# Patient Record
Sex: MALE | Race: WHITE | NOT HISPANIC OR LATINO | Employment: OTHER | URBAN - METROPOLITAN AREA
[De-identification: names, ages, dates, MRNs, and addresses within clinical notes are randomized per-mention and may not be internally consistent; named-entity substitution may affect disease eponyms.]

---

## 2017-06-12 ENCOUNTER — TELEPHONE (OUTPATIENT)
Dept: FAMILY MEDICINE | Facility: CLINIC | Age: 62
End: 2017-06-12

## 2017-06-23 ENCOUNTER — OFFICE VISIT (OUTPATIENT)
Dept: FAMILY MEDICINE | Facility: CLINIC | Age: 62
End: 2017-06-23
Payer: COMMERCIAL

## 2017-06-23 VITALS
BODY MASS INDEX: 24.64 KG/M2 | WEIGHT: 176 LBS | HEART RATE: 74 BPM | TEMPERATURE: 96 F | HEIGHT: 71 IN | RESPIRATION RATE: 24 BRPM | OXYGEN SATURATION: 97 % | SYSTOLIC BLOOD PRESSURE: 138 MMHG | DIASTOLIC BLOOD PRESSURE: 80 MMHG

## 2017-06-23 DIAGNOSIS — I10 BENIGN ESSENTIAL HYPERTENSION: ICD-10-CM

## 2017-06-23 DIAGNOSIS — M72.0 DUPUYTREN'S CONTRACTURE OF HAND: ICD-10-CM

## 2017-06-23 DIAGNOSIS — H54.40 BLIND LEFT EYE: ICD-10-CM

## 2017-06-23 DIAGNOSIS — E78.5 HYPERLIPIDEMIA LDL GOAL <130: ICD-10-CM

## 2017-06-23 DIAGNOSIS — R21 RASH: Primary | ICD-10-CM

## 2017-06-23 PROBLEM — I25.2 OLD MYOCARDIAL INFARCTION: Status: ACTIVE | Noted: 2017-06-23

## 2017-06-23 PROCEDURE — 99214 OFFICE O/P EST MOD 30 MIN: CPT | Performed by: FAMILY MEDICINE

## 2017-06-23 RX ORDER — LISINOPRIL AND HYDROCHLOROTHIAZIDE 20; 25 MG/1; MG/1
1 TABLET ORAL DAILY
Qty: 90 TABLET | Refills: 3 | COMMUNITY
Start: 2017-06-23 | End: 2018-04-18 | Stop reason: ALTCHOICE

## 2017-06-23 RX ORDER — ATENOLOL 100 MG/1
100 TABLET ORAL DAILY
Qty: 90 TABLET | Refills: 3 | COMMUNITY
Start: 2017-06-23 | End: 2018-04-18

## 2017-06-23 RX ORDER — PRAVASTATIN SODIUM 40 MG
40 TABLET ORAL DAILY
Qty: 90 TABLET | Refills: 3 | COMMUNITY
Start: 2017-06-23 | End: 2018-04-18

## 2017-06-23 RX ORDER — OMEPRAZOLE 40 MG/1
40 CAPSULE, DELAYED RELEASE ORAL DAILY
Qty: 90 CAPSULE | Refills: 3 | COMMUNITY
Start: 2017-06-23 | End: 2018-04-18

## 2017-06-23 NOTE — MR AVS SNAPSHOT
After Visit Summary   6/23/2017    Isabel Alvarez    MRN: [id number]           Patient Information     Date Of Birth          1955        Visit Information        Provider Department      6/23/2017 8:40 AM Hossein Tierney MD [facility]        Today's Diagnoses     Rash    -  1       Follow-ups after your visit        Additional Services     DERMATOLOGY REFERRAL       Your provider has referred you to: [facility] [place] [phone]   [email or web address]    Please be aware that coverage of these services is subject to the terms and limitations of your health insurance plan.  Call member services at your health plan with any benefit or coverage questions.      Please bring the following with you to your appointment:    (1) Any X-Rays, CTs or MRIs which have been performed.  Contact the facility where they were done to arrange for  prior to your scheduled appointment.    (2) List of current medications  (3) This referral request   (4) Any documents/labs given to you for this referral                  Who to contact     If you have questions or need follow up information about today's clinic visit or your schedule please contact [facility] directly at [phone].  Normal or non-critical lab and imaging results will be communicated to you by MyChart, letter or phone within 4 business days after the clinic has received the results. If you do not hear from us within 7 days, please contact the clinic through MyChart or phone. If you have a critical or abnormal lab result, we will notify you by phone as soon as possible.  Submit refill requests through [employer] or call your pharmacy and they will forward the refill request to us. Please allow 3 business days for your refill to be completed.          Additional Information About Your Visit        [employer] Information     [employer] lets you send messages to your  "doctor, view your test results, renew your prescriptions, schedule appointments and more. To sign up, go to [email or web address] . Click on \"Log in\" on the left side of the screen, which will take you to the Welcome page. Then click on \"Sign up Now\" on the right side of the page.     You will be asked to enter the access code listed below, as well as some personal information. Please follow the directions to create your username and password.     Your access code is: [email or web address]  Expires: 2017  9:24 AM     Your access code will  in 90 days. If you need help or a new code, please call your [place] clinic or [phone].        Care EveryWhere ID     This is your Care EveryWhere ID. This could be used by other organizations to access your [place] medical records  [id number]        Your Vitals Were     Pulse Temperature Respirations Height Pulse Oximetry BMI (Body Mass Index)    74 96  F (35.6  C) (Temporal) 24 5' 10.6\" (1.793 m) 97% 24.83 kg/m2       Blood Pressure from Last 3 Encounters:   17 150/84    Weight from Last 3 Encounters:   17 176 lb (79.8 kg)              We Performed the Following     DERMATOLOGY REFERRAL        Primary Care Provider    None Specified       No primary provider on file.        Equal Access to Services     [facility]: Hadii aad ku hadasho Soomaali, waaxda luqadaha, qaybta kaalmada adekiayada, constantin lomeli . So [facility] [phone].    ATENCIÓN: Si habla español, tiene a julio disposición servicios gratuitos de asistencia lingüística. Llame al [phone].    We comply with applicable federal civil rights laws and [place] laws. We do not discriminate on the basis of race, color, national origin, age, disability sex, sexual orientation or gender identity.            Thank you!     Thank you for choosing [facility]  for your care. Our goal is always to provide you with excellent care. Hearing back from our patients is " one way we can continue to improve our services. Please take a few minutes to complete the written survey that you may receive in the mail after your visit with us. Thank you!             Your Updated Medication List - Protect others around you: Learn how to safely use, store and throw away your medicines at www.disposemymeds.org.          This list is accurate as of: 6/23/17  9:24 AM.  Always use your most recent med list.                   Brand Name Dispense Instructions for use Diagnosis    atenolol 100 MG tablet    TENORMIN    90 tablet    Take 1 tablet (100 mg) by mouth daily        lisinopril-hydrochlorothiazide 20-25 MG per tablet    PRINZIDE/ZESTORETIC    90 tablet    Take 1 tablet by mouth daily        omeprazole 40 MG capsule    priLOSEC    90 capsule    Take 1 capsule (40 mg) by mouth daily Take 30-60 minutes before a meal.        PRAVACHOL 40 MG tablet   Generic drug:  pravastatin     90 tablet    Take 1 tablet (40 mg) by mouth daily
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
## 2017-06-23 NOTE — NURSING NOTE
"Chief Complaint   Patient presents with     Establish Care       Initial /84  Pulse 74  Temp 96  F (35.6  C) (Temporal)  Resp 24  Ht 5' 10.6\" (1.793 m)  Wt 176 lb (79.8 kg)  SpO2 97%  BMI 24.83 kg/m2 Estimated body mass index is 24.83 kg/(m^2) as calculated from the following:    Height as of this encounter: 5' 10.6\" (1.793 m).    Weight as of this encounter: 176 lb (79.8 kg).  Medication Reconciliation: complete    "

## 2017-07-19 ENCOUNTER — TRANSFERRED RECORDS (OUTPATIENT)
Dept: HEALTH INFORMATION MANAGEMENT | Facility: CLINIC | Age: 62
End: 2017-07-19

## 2017-07-20 DIAGNOSIS — I10 BENIGN ESSENTIAL HYPERTENSION: Primary | ICD-10-CM

## 2017-07-20 RX ORDER — LISINOPRIL 20 MG/1
20 TABLET ORAL DAILY
Qty: 90 TABLET | Refills: 3 | Status: SHIPPED | OUTPATIENT
Start: 2017-07-20 | End: 2017-12-15

## 2017-07-20 RX ORDER — METOPROLOL SUCCINATE 200 MG/1
200 TABLET, EXTENDED RELEASE ORAL DAILY
Qty: 90 TABLET | Refills: 1 | Status: SHIPPED | OUTPATIENT
Start: 2017-07-20 | End: 2017-12-15

## 2017-07-20 NOTE — TELEPHONE ENCOUNTER
Reason for Call:  Medication or medication refill:    Do you use a [place] [facility]?  Name of the pharmacy and phone number for the current request:  [facility] - [phone]    Name of the medication requested:     Other request: patients mother is calling stating that Sj doesn't have any atenolol (TENORMIN) 100 MG tablet and requesting a replacement be sent to the pharmacy. 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 Please advise    Can we leave a detailed message on this number? YES    Phone number patient can be reached at: Home number on file [phone] (home)    Best Time: any    Call taken on 7/20/2017 at 3:28 PM by Tanna Dwyer

## 2017-07-20 NOTE — TELEPHONE ENCOUNTER
Dr. Tierney please sign for metoprolol. This has been suggested by pharmacy to use in place of Atenolol due to atenolol shortage.  Carina Ram CMA ([facility])

## 2017-09-27 ENCOUNTER — TRANSFERRED RECORDS (OUTPATIENT)
Dept: HEALTH INFORMATION MANAGEMENT | Facility: CLINIC | Age: 62
End: 2017-09-27

## 2017-11-30 ENCOUNTER — TELEPHONE (OUTPATIENT)
Dept: FAMILY MEDICINE | Facility: CLINIC | Age: 62
End: 2017-11-30

## 2017-11-30 NOTE — TELEPHONE ENCOUNTER
Panel Management Review      Patient has the following on his problem list: None      Composite cancer screening  Chart review shows that this patient is due/due soon for the following Colonoscopy  Summary:    Patient is due/failing the following:   COLONOSCOPY    Action needed:   Colonoscopy     Type of outreach:    Sent letter.    Questions for provider review:    None                                                                                                                                    ers     Chart routed to none .

## 2017-11-30 NOTE — LETTER
November 30, 2017      Isabel Alvarez  [address] [facility] [address]        Dear Isabel,       Our records show that you are due for a screening colonoscopy. Please call scheduling at [phone] to make your appointment to discuss this with your doctor. We look forward to seeing you.      Sincerely,        Hossein Tierney MD

## 2017-12-15 ENCOUNTER — OFFICE VISIT (OUTPATIENT)
Dept: FAMILY MEDICINE | Facility: CLINIC | Age: 62
End: 2017-12-15
Payer: COMMERCIAL

## 2017-12-15 VITALS
SYSTOLIC BLOOD PRESSURE: 126 MMHG | OXYGEN SATURATION: 96 % | DIASTOLIC BLOOD PRESSURE: 70 MMHG | HEART RATE: 77 BPM | WEIGHT: 175.1 LBS | HEIGHT: 71 IN | BODY MASS INDEX: 24.51 KG/M2 | TEMPERATURE: 97.7 F

## 2017-12-15 DIAGNOSIS — Z12.5 SCREENING FOR PROSTATE CANCER: ICD-10-CM

## 2017-12-15 DIAGNOSIS — I10 BENIGN ESSENTIAL HYPERTENSION: Primary | ICD-10-CM

## 2017-12-15 DIAGNOSIS — Z12.11 SCREENING FOR COLON CANCER: ICD-10-CM

## 2017-12-15 DIAGNOSIS — E78.5 HYPERLIPIDEMIA LDL GOAL <130: ICD-10-CM

## 2017-12-15 DIAGNOSIS — Z72.0 TOBACCO ABUSE DISORDER: ICD-10-CM

## 2017-12-15 LAB
ALBUMIN SERPL-MCNC: 4.1 G/DL (ref 3.4–5)
ALP SERPL-CCNC: 104 U/L (ref 40–150)
ALT SERPL W P-5'-P-CCNC: 52 U/L (ref 0–70)
ANION GAP SERPL CALCULATED.3IONS-SCNC: 8 MMOL/L (ref 3–14)
AST SERPL W P-5'-P-CCNC: 52 U/L (ref 0–45)
BILIRUB SERPL-MCNC: 0.5 MG/DL (ref 0.2–1.3)
BUN SERPL-MCNC: 5 MG/DL (ref 7–30)
CALCIUM SERPL-MCNC: 9.1 MG/DL (ref 8.5–10.1)
CHLORIDE SERPL-SCNC: 92 MMOL/L (ref 94–109)
CHOLEST SERPL-MCNC: 148 MG/DL
CO2 SERPL-SCNC: 27 MMOL/L (ref 20–32)
CREAT SERPL-MCNC: 0.88 MG/DL (ref 0.66–1.25)
GFR SERPL CREATININE-BSD FRML MDRD: 89 ML/MIN/1.7M2
GLUCOSE SERPL-MCNC: 100 MG/DL (ref 70–99)
HDLC SERPL-MCNC: 60 MG/DL
LDLC SERPL CALC-MCNC: 69 MG/DL
NONHDLC SERPL-MCNC: 88 MG/DL
POTASSIUM SERPL-SCNC: 4.2 MMOL/L (ref 3.4–5.3)
PROT SERPL-MCNC: 8.4 G/DL (ref 6.8–8.8)
PSA SERPL-ACNC: 1.63 UG/L (ref 0–4)
SODIUM SERPL-SCNC: 127 MMOL/L (ref 133–144)
TRIGL SERPL-MCNC: 95 MG/DL

## 2017-12-15 PROCEDURE — G0103 PSA SCREENING: HCPCS | Performed by: FAMILY MEDICINE

## 2017-12-15 PROCEDURE — 99214 OFFICE O/P EST MOD 30 MIN: CPT | Performed by: FAMILY MEDICINE

## 2017-12-15 PROCEDURE — 36415 COLL VENOUS BLD VENIPUNCTURE: CPT | Performed by: FAMILY MEDICINE

## 2017-12-15 PROCEDURE — 80061 LIPID PANEL: CPT | Performed by: FAMILY MEDICINE

## 2017-12-15 PROCEDURE — 80053 COMPREHEN METABOLIC PANEL: CPT | Performed by: FAMILY MEDICINE

## 2017-12-15 RX ORDER — VARENICLINE TARTRATE 1 MG/1
1 TABLET, FILM COATED ORAL 2 TIMES DAILY
Qty: 60 TABLET | Refills: 6 | Status: SHIPPED | OUTPATIENT
Start: 2017-12-15 | End: 2018-12-23

## 2017-12-15 RX ORDER — METOPROLOL SUCCINATE 200 MG/1
200 TABLET, EXTENDED RELEASE ORAL DAILY
Qty: 90 TABLET | Refills: 3 | Status: SHIPPED | OUTPATIENT
Start: 2017-12-15 | End: 2018-04-18

## 2017-12-15 RX ORDER — LISINOPRIL 20 MG/1
20 TABLET ORAL DAILY
Qty: 90 TABLET | Refills: 3 | Status: SHIPPED | OUTPATIENT
Start: 2017-12-15 | End: 2018-04-18

## 2017-12-15 NOTE — NURSING NOTE
"Chief Complaint   Patient presents with     preventative health     colonoscopy        Initial /70 (BP Location: Right arm, Patient Position: Chair, Cuff Size: Adult Large)  Pulse 77  Temp 97.7  F (36.5  C) (Temporal)  Ht 5' 10.6\" (1.793 m)  Wt 175 lb 1.6 oz (79.4 kg)  SpO2 96%  BMI 24.7 kg/m2 Estimated body mass index is 24.7 kg/(m^2) as calculated from the following:    Height as of this encounter: 5' 10.6\" (1.793 m).    Weight as of this encounter: 175 lb 1.6 oz (79.4 kg).  Medication Reconciliation: complete     "

## 2017-12-15 NOTE — PROGRESS NOTES
SUBJECTIVE:   Isabel Alvarez is a 62 year old male who presents to clinic today for the following health issues Several complaints:       Discuss having a Colonoscopy done. He has never had one before in the past.      Patient would like to have his blood work done today as well. He is fasting. He would like to look into getting off of his cholesterol medication if possible. He is currently using Pravastatin. He is not experiencing any joint pain with this.     Wife would like all of his refills timed for one . She is tired of making more than one trip to the pharmacy.            Problem list and histories reviewed & adjusted, as indicated.  Additional history:         Reviewed and updated as needed this visit by clinical staff       Reviewed and updated as needed this visit by Provider        SUBJECTIVE:  Isabel  is a 62 year old male who presents for:  Multiple issues today. Needs follow-up on his high blood pressure and lipids. He is due for blood work. He needs refill on his medications. He wants to try to get all his medications to come due at the same time. Also needs to quit smoking. Otherwise he has been feeling well.    No past medical history on file.  No past surgical history on file.  Social History   Substance Use Topics     Smoking status: Current Every Day Smoker     Packs/day: 0.50     Years: 50.00     Types: Cigarettes     Smokeless tobacco: Never Used     Alcohol use Yes      Comment: 2 cases per week      Current Outpatient Prescriptions   Medication Sig Dispense Refill     metoprolol (TOPROL-XL) 200 MG 24 hr tablet Take 1 tablet (200 mg) by mouth daily 90 tablet 3     lisinopril (PRINIVIL/ZESTRIL) 20 MG tablet Take 1 tablet (20 mg) by mouth daily 90 tablet 3     varenicline (CHANTIX STARTING MONTH ROMERO) 0.5 MG X 11 & 1 MG X 42 tablet Take 0.5 mg tab daily for 3 days, then 0.5 mg tab twice daily for 4 days, then 1 mg twice daily. 53 tablet 0     varenicline (CHANTIX CONTINUING MONTH ROMERO)  "1 MG tablet Take 1 tablet (1 mg) by mouth 2 times daily 60 tablet 6     pravastatin (PRAVACHOL) 40 MG tablet Take 1 tablet (40 mg) by mouth daily 90 tablet 3     omeprazole (PRILOSEC) 40 MG capsule Take 1 capsule (40 mg) by mouth daily Take 30-60 minutes before a meal. 90 capsule 3     [DISCONTINUED] metoprolol (TOPROL-XL) 200 MG 24 hr tablet Take 1 tablet (200 mg) by mouth daily 90 tablet 1     [DISCONTINUED] lisinopril (PRINIVIL/ZESTRIL) 20 MG tablet Take 1 tablet (20 mg) by mouth daily 90 tablet 3     lisinopril-hydrochlorothiazide (PRINZIDE/ZESTORETIC) 20-25 MG per tablet Take 1 tablet by mouth daily 90 tablet 3     atenolol (TENORMIN) 100 MG tablet Take 1 tablet (100 mg) by mouth daily 90 tablet 3       REVIEW OF SYSTEMS:   5 point ROS negative except as noted above in HPI, including Gen., Resp, CV, GI &  system review.     OBJECTIVE:  Vitals: /70 (BP Location: Right arm, Patient Position: Chair, Cuff Size: Adult Large)  Pulse 77  Temp 97.7  F (36.5  C) (Temporal)  Ht 5' 10.6\" (1.793 m)  Wt 175 lb 1.6 oz (79.4 kg)  SpO2 96%  BMI 24.7 kg/m2  BMI= Body mass index is 24.7 kg/(m^2).  He is alert and talkative. Neck is supple no JVD. Lungs are with a few wheezes on deep breaths. Heart with a regular rhythm. Skin clear. Extremities with no edema.    ASSESSMENT:  #1 hypertension #2 hyperlipidemia #3 tobacco use disorder    PLAN:  Ordering lab work today. Try to coordinate his medicines to get him at same time we will reorder the Toprol and lisinopril today. He needs a colonoscopy. We will start him on Chantix for smoking cessation. He is aware of possibility of sleep disturbances.        Hossein Tierney MD  [facility]              "

## 2017-12-15 NOTE — MR AVS SNAPSHOT
After Visit Summary   12/15/2017    Isabel Alvarez    MRN: [id number]           Patient Information     Date Of Birth          1955        Visit Information        Provider Department      12/15/2017 9:40 AM Hossein Tierney MD [facility]        Today's Diagnoses     Benign essential hypertension    -  1    Hyperlipidemia LDL goal <130        Screening for prostate cancer        Tobacco abuse disorder        Screening for colon cancer           Follow-ups after your visit        Additional Services     GASTROENTEROLOGY ADULT REF PROCEDURE ONLY       Last Lab Result: No results found for: CR  Body mass index is 24.7 kg/(m^2).     Needed:  No  Language:  English    Patient will be contacted to schedule procedure.     Please be aware that coverage of these services is subject to the terms and limitations of your health insurance plan.  Call member services at your health plan with any benefit or coverage questions.  Any procedures must be performed at a [place] facility OR coordinated by your clinic's referral office.    Please bring the following with you to your appointment:    (1) Any X-Rays, CTs or MRIs which have been performed.  Contact the facility where they were done to arrange for  prior to your scheduled appointment.    (2) List of current medications   (3) This referral request   (4) Any documents/labs given to you for this referral                  Who to contact     If you have questions or need follow up information about today's clinic visit or your schedule please contact [facility] directly at [phone].  Normal or non-critical lab and imaging results will be communicated to you by MyChart, letter or phone within 4 business days after the clinic has received the results. If you do not hear from us within 7 days, please contact the clinic through MyChart or phone. If you have a critical or abnormal lab result, we will notify  "you by phone as soon as possible.  Submit refill requests through [employer] or call your pharmacy and they will forward the refill request to us. Please allow 3 business days for your refill to be completed.          Additional Information About Your Visit        [employer] Information     [employer] lets you send messages to your doctor, view your test results, renew your prescriptions, schedule appointments and more. To sign up, go to [email or web address] . Click on \"Log in\" on the left side of the screen, which will take you to the Welcome page. Then click on \"Sign up Now\" on the right side of the page.     You will be asked to enter the access code listed below, as well as some personal information. Please follow the directions to create your username and password.     Your access code is: [email or web address]  Expires: 3/15/2018  1:53 PM     Your access code will  in 90 days. If you need help or a new code, please call your [place] clinic or [phone].        Care EveryWhere ID     This is your Care EveryWhere ID. This could be used by other organizations to access your [place] medical records  [id number]        Your Vitals Were     Pulse Temperature Height Pulse Oximetry BMI (Body Mass Index)       77 97.7  F (36.5  C) (Temporal) 5' 10.6\" (1.793 m) 96% 24.7 kg/m2        Blood Pressure from Last 3 Encounters:   12/15/17 126/70   17 138/80    Weight from Last 3 Encounters:   12/15/17 175 lb 1.6 oz (79.4 kg)   17 176 lb (79.8 kg)              We Performed the Following     Comprehensive metabolic panel (BMP + Alb, Alk Phos, ALT, AST, Total. Bili, TP)     GASTROENTEROLOGY ADULT REF PROCEDURE ONLY     Lipid Profile     Prostate spec antigen screen          Today's Medication Changes          These changes are accurate as of: 12/15/17  1:53 PM.  If you have any questions, ask your nurse or doctor.               Start taking these medicines.        Dose/Directions    * varenicline 0.5 MG X 11 & 1 MG X 42 " tablet   Commonly known as:  CHANTIX STARTING MONTH PAK   Used for:  Tobacco abuse disorder   Started by:  Hossein Tierney MD        Take 0.5 mg tab daily for 3 days, then 0.5 mg tab twice daily for 4 days, then 1 mg twice daily.   Quantity:  53 tablet   Refills:  0       * varenicline 1 MG tablet   Commonly known as:  CHANTIX CONTINUING MONTH ROMERO   Used for:  Tobacco abuse disorder   Started by:  Hossein Tierney MD        Dose:  1 mg   Take 1 tablet (1 mg) by mouth 2 times daily   Quantity:  60 tablet   Refills:  6       * Notice:  This list has 2 medication(s) that are the same as other medications prescribed for you. Read the directions carefully, and ask your doctor or other care provider to review them with you.         Where to get your medicines      These medications were sent to [facility] [address] [place] [id number]     Phone:  [phone]     lisinopril 20 MG tablet    metoprolol 200 MG 24 hr tablet    varenicline 0.5 MG X 11 & 1 MG X 42 tablet    varenicline 1 MG tablet                Primary Care Provider Office Phone # Fax #    Hossein Tierney [phone]       [address] [place] [address]        Equal Access to Services     [facility]: Hadii stewart ku hadasho Soomaali, waaxda luqadaha, qaybta kaalmada adeegyada, waxay idiin haygeraldon yaya willis. So [facility] [phone].    ATENCIÓN: Si habla español, tiene a julio disposición servicios gratuitos de asistencia lingüística. colette al [phone].    We comply with applicable federal civil rights laws and [place] laws. We do not discriminate on the basis of race, color, national origin, age, disability, sex, sexual orientation, or gender identity.            Thank you!     Thank you for choosing [facility]  for your care. Our goal is always to provide you with excellent care. Hearing back from our patients is  one way we can continue to improve our services. Please take a few minutes to complete the written survey that you may receive in the mail after your visit with us. Thank you!             Your Updated Medication List - Protect others around you: Learn how to safely use, store and throw away your medicines at www.disposemymeds.org.          This list is accurate as of: 12/15/17  1:53 PM.  Always use your most recent med list.                   Brand Name Dispense Instructions for use Diagnosis    atenolol 100 MG tablet    TENORMIN    90 tablet    Take 1 tablet (100 mg) by mouth daily        lisinopril 20 MG tablet    PRINIVIL/ZESTRIL    90 tablet    Take 1 tablet (20 mg) by mouth daily    Benign essential hypertension       lisinopril-hydrochlorothiazide 20-25 MG per tablet    PRINZIDE/ZESTORETIC    90 tablet    Take 1 tablet by mouth daily        metoprolol 200 MG 24 hr tablet    TOPROL-XL    90 tablet    Take 1 tablet (200 mg) by mouth daily    Benign essential hypertension       omeprazole 40 MG capsule    priLOSEC    90 capsule    Take 1 capsule (40 mg) by mouth daily Take 30-60 minutes before a meal.        PRAVACHOL 40 MG tablet   Generic drug:  pravastatin     90 tablet    Take 1 tablet (40 mg) by mouth daily        * varenicline 0.5 MG X 11 & 1 MG X 42 tablet    CHANTIX STARTING MONTH ROMERO    53 tablet    Take 0.5 mg tab daily for 3 days, then 0.5 mg tab twice daily for 4 days, then 1 mg twice daily.    Tobacco abuse disorder       * varenicline 1 MG tablet    CHANTIX CONTINUING MONTH ROMERO    60 tablet    Take 1 tablet (1 mg) by mouth 2 times daily    Tobacco abuse disorder       * Notice:  This list has 2 medication(s) that are the same as other medications prescribed for you. Read the directions carefully, and ask your doctor or other care provider to review them with you.

## 2017-12-19 ENCOUNTER — TELEPHONE (OUTPATIENT)
Dept: FAMILY MEDICINE | Facility: CLINIC | Age: 62
End: 2017-12-19

## 2017-12-19 NOTE — TELEPHONE ENCOUNTER
Called patient to schedule colonoscopy. Patient states that he will have to think about it and will call back when he is ready.

## 2017-12-21 NOTE — PROGRESS NOTES
Labs show your PSA was normal cholesterol l good at 148 but your blood chemistries showed your sodium and chloride level to be a little low.  With this in mind you do not need to watch salt too much and this should be rechecked in about 1-3 months.

## 2018-02-28 ENCOUNTER — TELEPHONE (OUTPATIENT)
Dept: FAMILY MEDICINE | Facility: CLINIC | Age: 63
End: 2018-02-28

## 2018-02-28 NOTE — TELEPHONE ENCOUNTER
Panel Management Review      Patient has the following on his problem list: None      Composite cancer screening  Chart review shows that this patient is due/due soon for the following Colonoscopy  Summary:    Patient is due/failing the following:   COLONOSCOPY    Action needed:   Patient needs referral/order: screening colonoscopy     Type of outreach:    Will reach out in a couple months. Has already been contacted recently     Questions for provider review:    None                                                                                                                                    ers     Chart routed to none .

## 2018-04-10 ENCOUNTER — HOSPITAL ENCOUNTER (OUTPATIENT)
Dept: MRI IMAGING | Facility: CLINIC | Age: 63
Discharge: HOME | End: 2018-04-10
Attending: FAMILY MEDICINE | Admitting: FAMILY MEDICINE
Payer: COMMERCIAL

## 2018-04-10 ENCOUNTER — TRANSFERRED RECORDS (OUTPATIENT)
Dept: HEALTH INFORMATION MANAGEMENT | Facility: CLINIC | Age: 63
End: 2018-04-10

## 2018-04-10 ENCOUNTER — TELEPHONE (OUTPATIENT)
Dept: FAMILY MEDICINE | Facility: CLINIC | Age: 63
End: 2018-04-10

## 2018-04-10 DIAGNOSIS — H57.9 EYE EXAM ABNORMAL: ICD-10-CM

## 2018-04-10 DIAGNOSIS — H57.9 EYE EXAM ABNORMAL: Primary | ICD-10-CM

## 2018-04-10 PROCEDURE — A9585 GADOBUTROL INJECTION: HCPCS | Performed by: FAMILY MEDICINE

## 2018-04-10 PROCEDURE — 25000128 H RX IP 250 OP 636: Performed by: FAMILY MEDICINE

## 2018-04-10 PROCEDURE — 70553 MRI BRAIN STEM W/O & W/DYE: CPT

## 2018-04-10 RX ORDER — GADOBUTROL 604.72 MG/ML
7.5 INJECTION INTRAVENOUS ONCE
Status: COMPLETED | OUTPATIENT
Start: 2018-04-10 | End: 2018-04-10

## 2018-04-10 RX ADMIN — GADOBUTROL 7.5 ML: 604.72 INJECTION INTRAVENOUS at 18:37

## 2018-04-10 NOTE — TELEPHONE ENCOUNTER
Dr. Wang from [facility] called and Isabel had an eye exam that came back abnormal.  The provider wants to rule out a stroke or tumor.  I am going to set him up now for this MRI.     Patient is scheduled for 6 pm tonight.  Patient is aware and if abnormal result they will call the on call provider. Will route this to Dr. Tierney.

## 2018-04-18 ENCOUNTER — OFFICE VISIT (OUTPATIENT)
Dept: FAMILY MEDICINE | Facility: CLINIC | Age: 63
End: 2018-04-18
Payer: COMMERCIAL

## 2018-04-18 VITALS
WEIGHT: 179.4 LBS | BODY MASS INDEX: 25.11 KG/M2 | DIASTOLIC BLOOD PRESSURE: 84 MMHG | OXYGEN SATURATION: 100 % | TEMPERATURE: 97 F | HEIGHT: 71 IN | SYSTOLIC BLOOD PRESSURE: 136 MMHG | HEART RATE: 66 BPM

## 2018-04-18 DIAGNOSIS — K21.9 GASTROESOPHAGEAL REFLUX DISEASE WITHOUT ESOPHAGITIS: ICD-10-CM

## 2018-04-18 DIAGNOSIS — G93.9 BRAIN LESION: Primary | ICD-10-CM

## 2018-04-18 DIAGNOSIS — I10 BENIGN ESSENTIAL HYPERTENSION: ICD-10-CM

## 2018-04-18 DIAGNOSIS — E78.5 HYPERLIPIDEMIA LDL GOAL <130: ICD-10-CM

## 2018-04-18 PROCEDURE — 99214 OFFICE O/P EST MOD 30 MIN: CPT | Performed by: FAMILY MEDICINE

## 2018-04-18 RX ORDER — LISINOPRIL 20 MG/1
20 TABLET ORAL DAILY
Qty: 90 TABLET | Refills: 3 | Status: SHIPPED | OUTPATIENT
Start: 2018-04-18 | End: 2019-04-26

## 2018-04-18 RX ORDER — LISINOPRIL AND HYDROCHLOROTHIAZIDE 20; 25 MG/1; MG/1
1 TABLET ORAL DAILY
Qty: 90 TABLET | Refills: 3 | Status: CANCELLED | OUTPATIENT
Start: 2018-04-18

## 2018-04-18 RX ORDER — PRAVASTATIN SODIUM 40 MG
40 TABLET ORAL DAILY
Qty: 90 TABLET | Refills: 3 | Status: SHIPPED | OUTPATIENT
Start: 2018-04-18 | End: 2019-04-26

## 2018-04-18 RX ORDER — OMEPRAZOLE 40 MG/1
40 CAPSULE, DELAYED RELEASE ORAL DAILY
Qty: 90 CAPSULE | Refills: 3 | Status: SHIPPED | OUTPATIENT
Start: 2018-04-18 | End: 2019-04-26

## 2018-04-18 RX ORDER — METOPROLOL SUCCINATE 200 MG/1
200 TABLET, EXTENDED RELEASE ORAL DAILY
Qty: 90 TABLET | Refills: 3 | Status: SHIPPED | OUTPATIENT
Start: 2018-04-18 | End: 2019-04-26

## 2018-04-18 NOTE — NURSING NOTE
"Chief Complaint   Patient presents with     MRI results     stroke/tumor        Initial /84 (BP Location: Right arm, Patient Position: Chair, Cuff Size: Adult Large)  Pulse 66  Temp 97  F (36.1  C) (Temporal)  Ht 5' 10.6\" (1.793 m)  Wt 179 lb 6.4 oz (81.4 kg)  SpO2 100%  BMI 25.31 kg/m2 Estimated body mass index is 25.31 kg/(m^2) as calculated from the following:    Height as of this encounter: 5' 10.6\" (1.793 m).    Weight as of this encounter: 179 lb 6.4 oz (81.4 kg).  Medication Reconciliation: complete    "

## 2018-04-18 NOTE — MR AVS SNAPSHOT
"              After Visit Summary   2018    Isabel Alvarez    MRN: [id number]           Patient Information     Date Of Birth          1955        Visit Information        Provider Department      2018 11:20 AM Hossein Tierney MD [facility]        Today's Diagnoses     Brain lesion    -  1    Benign essential hypertension        Hyperlipidemia LDL goal <130        Gastroesophageal reflux disease without esophagitis           Follow-ups after your visit        Who to contact     If you have questions or need follow up information about today's clinic visit or your schedule please contact [facility] directly at [phone].  Normal or non-critical lab and imaging results will be communicated to you by [employer], letter or phone within 4 business days after the clinic has received the results. If you do not hear from us within 7 days, please contact the clinic through [employer] or phone. If you have a critical or abnormal lab result, we will notify you by phone as soon as possible.  Submit refill requests through [employer] or call your pharmacy and they will forward the refill request to us. Please allow 3 business days for your refill to be completed.          Additional Information About Your Visit        MyChart Information     [employer] lets you send messages to your doctor, view your test results, renew your prescriptions, schedule appointments and more. To sign up, go to [email or web address] . Click on \"Log in\" on the left side of the screen, which will take you to the Welcome page. Then click on \"Sign up Now\" on the right side of the page.     You will be asked to enter the access code listed below, as well as some personal information. Please follow the directions to create your username and password.     Your access code is: [email or web address]  Expires: 2018  1:12 PM     Your access code will  in 90 days. If you need help or a new code, please call your " "[facility] or [phone].        Care EveryWhere ID     This is your Care EveryWhere ID. This could be used by other organizations to access your [facility] medical records  [id number]        Your Vitals Were     Pulse Temperature Height Pulse Oximetry BMI (Body Mass Index)       66 97  F (36.1  C) (Temporal) 5' 10.6\" (1.793 m) 100% 25.31 kg/m2        Blood Pressure from Last 3 Encounters:   04/18/18 136/84   12/15/17 126/70   06/23/17 138/80    Weight from Last 3 Encounters:   04/18/18 179 lb 6.4 oz (81.4 kg)   12/15/17 175 lb 1.6 oz (79.4 kg)   06/23/17 176 lb (79.8 kg)              Today, you had the following     No orders found for display         Today's Medication Changes          These changes are accurate as of 4/18/18  1:12 PM.  If you have any questions, ask your nurse or doctor.               Stop taking these medicines if you haven't already. Please contact your care team if you have questions.     lisinopril-hydrochlorothiazide 20-25 MG per tablet   Commonly known as:  PRINZIDE/ZESTORETIC   Stopped by:  Hossein Tierney MD                Where to get your medicines      These medications were sent to [facility] [address] [place] [address]     Phone:  [phone]     lisinopril 20 MG tablet    metoprolol succinate 200 MG 24 hr tablet    omeprazole 40 MG capsule    pravastatin 40 MG tablet                Primary Care Provider Office Phone # Fax #    Hossein Tierney [phone]       [address] [facility] [address]        Equal Access to Services     VIKKI MANCERA AH: Hadmikey Jackson, waaxda luqadaha, qaybta kaalmada sophie, constantin willis. So [facility] [phone].    ATENCIÓN: Si habla español, tiene a julio disposición servicios gratuitos de asistencia lingüística. Llame al [phone].    We comply with applicable federal civil rights laws and [place] laws. We do " not discriminate on the basis of race, color, national origin, age, disability, sex, sexual orientation, or gender identity.            Thank you!     Thank you for choosing [facility]  for your care. Our goal is always to provide you with excellent care. Hearing back from our patients is one way we can continue to improve our services. Please take a few minutes to complete the written survey that you may receive in the mail after your visit with us. Thank you!             Your Updated Medication List - Protect others around you: Learn how to safely use, store and throw away your medicines at www.disposemymeds.org.          This list is accurate as of 4/18/18  1:12 PM.  Always use your most recent med list.                   Brand Name Dispense Instructions for use Diagnosis    lisinopril 20 MG tablet    PRINIVIL/ZESTRIL    90 tablet    Take 1 tablet (20 mg) by mouth daily    Benign essential hypertension       metoprolol succinate 200 MG 24 hr tablet    TOPROL-XL    90 tablet    Take 1 tablet (200 mg) by mouth daily    Benign essential hypertension       omeprazole 40 MG capsule    priLOSEC    90 capsule    Take 1 capsule (40 mg) by mouth daily Take 30-60 minutes before a meal.    Gastroesophageal reflux disease without esophagitis       pravastatin 40 MG tablet    PRAVACHOL    90 tablet    Take 1 tablet (40 mg) by mouth daily    Hyperlipidemia LDL goal <130       * varenicline 0.5 MG X 11 & 1 MG X 42 tablet    CHANTIX STARTING MONTH ROMERO    53 tablet    Take 0.5 mg tab daily for 3 days, then 0.5 mg tab twice daily for 4 days, then 1 mg twice daily.    Tobacco abuse disorder       * varenicline 1 MG tablet    CHANTIX CONTINUING MONTH ROMERO    60 tablet    Take 1 tablet (1 mg) by mouth 2 times daily    Tobacco abuse disorder       * Notice:  This list has 2 medication(s) that are the same as other medications prescribed for you. Read the directions carefully, and ask your doctor or other care provider  to review them with you.

## 2018-04-18 NOTE — PROGRESS NOTES
SUBJECTIVE:   Isabel Alvarez is a 62 year old male who presents to clinic today for the following health issues:    Follow up MRI.     Patient had the MRI done 04/10/18 at the request of Dr. Wang from [facility]. Patient had an eye exam there that was abnormal. Dr. Wang had concerns of a possible stroke/tumor. MRI results came back negative. Patient in clinic to discuss this.             Problem list and histories reviewed & adjusted, as indicated.  Additional history: as documented        Reviewed and updated as needed this visit by clinical staff       Reviewed and updated as needed this visit by Provider        SUBJECTIVE:  Isabel  is a 62 year old male who presents for: Discussion of an MRI of his brain.  He went to an eye doctor because he states he sees big black shadows throughout his vision during the day.  He sees white spots at night..  They could not find anything and they wanted a stroke or tumor ruled out and send him for a brain MRI.  This did rule out stroke or tumor and most other abnormalities.  There was a very small spot 2 mm of indeterminate significance in the frontal lobe area.  He states now since the MRI he has had some daily headaches.  The radiologist felt that this could be followed up in about 3 months with an interval MRI.  Another issue is his blood pressure is in for follow-up of that.  We made some changes.  Had to stop the hydrochlorothiazide he is a he was having a reaction to that.  To be doing okay now.  Needs refills on his lipid medication and omeprazole.  No past medical history on file.  No past surgical history on file.  Social History   Substance Use Topics     Smoking status: Former Smoker     Packs/day: 0.50     Years: 50.00     Types: Cigarettes     Quit date: 12/25/2017     Smokeless tobacco: Never Used     Alcohol use Yes      Comment: 2 cases per week      Current Outpatient Prescriptions   Medication Sig Dispense Refill     lisinopril (PRINIVIL/ZESTRIL)  "20 MG tablet Take 1 tablet (20 mg) by mouth daily 90 tablet 3     metoprolol succinate (TOPROL-XL) 200 MG 24 hr tablet Take 1 tablet (200 mg) by mouth daily 90 tablet 3     omeprazole (PRILOSEC) 40 MG capsule Take 1 capsule (40 mg) by mouth daily Take 30-60 minutes before a meal. 90 capsule 3     pravastatin (PRAVACHOL) 40 MG tablet Take 1 tablet (40 mg) by mouth daily 90 tablet 3     varenicline (CHANTIX CONTINUING MONTH PAK) 1 MG tablet Take 1 tablet (1 mg) by mouth 2 times daily 60 tablet 6     varenicline (CHANTIX STARTING MONTH PAK) 0.5 MG X 11 & 1 MG X 42 tablet Take 0.5 mg tab daily for 3 days, then 0.5 mg tab twice daily for 4 days, then 1 mg twice daily. 53 tablet 0     [DISCONTINUED] lisinopril (PRINIVIL/ZESTRIL) 20 MG tablet Take 1 tablet (20 mg) by mouth daily 90 tablet 3     [DISCONTINUED] metoprolol (TOPROL-XL) 200 MG 24 hr tablet Take 1 tablet (200 mg) by mouth daily 90 tablet 3     [DISCONTINUED] pravastatin (PRAVACHOL) 40 MG tablet Take 1 tablet (40 mg) by mouth daily 90 tablet 3       REVIEW OF SYSTEMS:   5 point ROS negative except as noted above in HPI, including Gen., Resp, CV, GI &  system review.     OBJECTIVE:  Vitals: /84 (BP Location: Right arm, Patient Position: Chair, Cuff Size: Adult Large)  Pulse 66  Temp 97  F (36.1  C) (Temporal)  Ht 5' 10.6\" (1.793 m)  Wt 179 lb 6.4 oz (81.4 kg)  SpO2 100%  BMI 25.31 kg/m2  BMI= Body mass index is 25.31 kg/(m^2).  He is alert oriented.  Eyes PERRLA EOMs full.  Speech is regular gait is regular.  MRI with reading as noted.    ASSESSMENT:  Tiny indeterminate brain lesion #2 hypertension    PLAN:  Recommended a follow-up MRI in about 3 months time.  The MRI results were faxed to his ophthalmologist.  He still having these visual difficulties.  And he states ever since the MRI he has had a headache.  I am not sure what this is all about.  Pressure is doing fine.  Refilled his lisinopril 20 mg a day and he continues on Toprol XL at 200 mg a " day.  Refill of his omeprazole and pravastatin.        Hossein Tierney MD  [facility]

## 2018-06-06 ENCOUNTER — TELEPHONE (OUTPATIENT)
Dept: FAMILY MEDICINE | Facility: CLINIC | Age: 63
End: 2018-06-06

## 2018-06-06 DIAGNOSIS — Z72.0 TOBACCO ABUSE DISORDER: Primary | ICD-10-CM

## 2018-06-06 NOTE — TELEPHONE ENCOUNTER
Reason for Call:  Other call back    Detailed comments: Pharmacy called because they need more information for the nicotine polacrilex medicaion and billing it to insurance.     Phone Number Patient can be reached at: Other phone number: [phone]    Best Time: any    Can we leave a detailed message on this number? YES    Call taken on 6/6/2018 at 5:38 PM by Loraine Pantoja

## 2018-09-07 ENCOUNTER — TELEPHONE (OUTPATIENT)
Dept: FAMILY MEDICINE | Facility: CLINIC | Age: 63
End: 2018-09-07

## 2018-09-07 NOTE — TELEPHONE ENCOUNTER
Reason for Call:  Other Prior Auth needed    Detailed comments: Isabel states his pharmacy instructs him to call.  His insurance company will not cover the Omeprazole, as this can be bought OTC.  He states OTC products do not work for him. He does not remember the names of the medications he has tried in the past.     Phone Number Patient can be reached at: Cell number on file:    Telephone Information:   Mobile [phone]       Best Time: any time    Can we leave a detailed message on this number? YES    Call taken on 9/7/2018 at 9:13 AM by Moira Cortés

## 2018-11-08 NOTE — TELEPHONE ENCOUNTER
I have explained to this to Isabel and so has the pharmacy that he will have to find something else OTC as they won't pay for a prescription.

## 2018-12-23 DIAGNOSIS — Z72.0 TOBACCO ABUSE DISORDER: ICD-10-CM

## 2018-12-26 RX ORDER — VARENICLINE TARTRATE 1 MG/1
TABLET, FILM COATED ORAL
Qty: 60 TABLET | Refills: 6 | Status: SHIPPED | OUTPATIENT
Start: 2018-12-26 | End: 2020-04-08

## 2018-12-26 NOTE — TELEPHONE ENCOUNTER
"chantix  Last Written Prescription Date: 12/15/2017  Last Fill Quantity: 60,  # refills: 6   Last office visit: 4/18/2018 with prescribing provider:      Future Office Visit:      Requested Prescriptions   Pending Prescriptions Disp Refills     CHANTIX 1 MG tablet [Pharmacy Med Name: CHANTIX 1MG         TAB] 60 tablet 6     Sig: TAKE ONE TABLET BY MOUTH TWICE DAILY    Partial Cholinergic Nicotinic Agonist Agents Passed - 12/23/2018  1:01 PM       Passed - Blood pressure under 140/90 in past 12 months    BP Readings from Last 3 Encounters:   04/18/18 136/84   12/15/17 126/70   06/23/17 138/80                Passed - Recent (12 mo) or future (30 days) visit within the authorizing provider's specialty    Patient had office visit in the last 12 months or has a visit in the next 30 days with authorizing provider or within the authorizing provider's specialty.  See \"Patient Info\" tab in inbasket, or \"Choose Columns\" in Meds & Orders section of the refill encounter.             Passed - Patient is 18 years of age or older          Prescription approved per [facility] Refill Protocol.      Dinora Tierney RN on 12/26/2018 at 5:18 PM    "

## 2019-03-20 ENCOUNTER — TRANSFERRED RECORDS (OUTPATIENT)
Dept: HEALTH INFORMATION MANAGEMENT | Facility: CLINIC | Age: 64
End: 2019-03-20

## 2019-03-21 ENCOUNTER — TRANSFERRED RECORDS (OUTPATIENT)
Dept: HEALTH INFORMATION MANAGEMENT | Facility: CLINIC | Age: 64
End: 2019-03-21

## 2019-04-26 ENCOUNTER — OFFICE VISIT (OUTPATIENT)
Dept: FAMILY MEDICINE | Facility: CLINIC | Age: 64
End: 2019-04-26
Payer: MEDICAID

## 2019-04-26 VITALS
DIASTOLIC BLOOD PRESSURE: 74 MMHG | BODY MASS INDEX: 25.97 KG/M2 | OXYGEN SATURATION: 98 % | TEMPERATURE: 97.9 F | HEIGHT: 70 IN | RESPIRATION RATE: 16 BRPM | HEART RATE: 70 BPM | WEIGHT: 181.4 LBS | SYSTOLIC BLOOD PRESSURE: 136 MMHG

## 2019-04-26 DIAGNOSIS — K21.9 GASTROESOPHAGEAL REFLUX DISEASE WITHOUT ESOPHAGITIS: ICD-10-CM

## 2019-04-26 DIAGNOSIS — E78.5 HYPERLIPIDEMIA LDL GOAL <130: ICD-10-CM

## 2019-04-26 DIAGNOSIS — I10 BENIGN ESSENTIAL HYPERTENSION: ICD-10-CM

## 2019-04-26 DIAGNOSIS — Z12.11 SPECIAL SCREENING FOR MALIGNANT NEOPLASMS, COLON: Primary | ICD-10-CM

## 2019-04-26 PROCEDURE — 99214 OFFICE O/P EST MOD 30 MIN: CPT | Performed by: FAMILY MEDICINE

## 2019-04-26 RX ORDER — LISINOPRIL 20 MG/1
20 TABLET ORAL DAILY
Qty: 90 TABLET | Refills: 3 | Status: SHIPPED | OUTPATIENT
Start: 2019-04-26 | End: 2020-04-08

## 2019-04-26 RX ORDER — METOPROLOL SUCCINATE 200 MG/1
200 TABLET, EXTENDED RELEASE ORAL DAILY
Qty: 90 TABLET | Refills: 3 | Status: SHIPPED | OUTPATIENT
Start: 2019-04-26 | End: 2020-04-08

## 2019-04-26 RX ORDER — PRAVASTATIN SODIUM 40 MG
40 TABLET ORAL DAILY
Qty: 90 TABLET | Refills: 3 | Status: SHIPPED | OUTPATIENT
Start: 2019-04-26 | End: 2020-04-08

## 2019-04-26 RX ORDER — OMEPRAZOLE 40 MG/1
40 CAPSULE, DELAYED RELEASE ORAL DAILY
Qty: 90 CAPSULE | Refills: 3 | Status: SHIPPED | OUTPATIENT
Start: 2019-04-26 | End: 2020-04-08

## 2019-04-26 ASSESSMENT — MIFFLIN-ST. JEOR: SCORE: 1641.57

## 2019-04-26 NOTE — PROGRESS NOTES
SUBJECTIVE:   Isabel Alvarez is a 63 year old male who presents to clinic today for the following   health issues:          Hospital Follow-up Visit:    Hospital/Nursing Home/IP Rehab Facility: [facility]  Date of Admission: 3/20/19  Date of Discharge: 3/23/19  Reason(s) for Admission: Abdominal Pain            Problems taking medications regularly:  None       Medication changes since discharge: None       Problems adhering to non-medication therapy:  None    Summary of hospitalization:  See outside records, reviewed and scanned  Diagnostic Tests/Treatments reviewed.  Follow up needed: none  Other Healthcare Providers Involved in Patient s Care:         None  Update since discharge: improved.     Post Discharge Medication Reconciliation: discharge medications reconciled, continue medications without change.  Plan of care communicated with patient     Coding guidelines for this visit:  Type of Medical   Decision Making Face-to-Face Visit       within 7 Days of discharge Face-to-Face Visit        within 14 days of discharge   Moderate Complexity 38015 [id number]   High Complexity 41320 11996                  Additional history: as documented    Reviewed  and updated as needed this visit by clinical staff  Tobacco  Allergies  Meds  Med Hx  Surg Hx  Fam Hx  Soc Hx        Reviewed and updated as needed this visit by Provider       SUBJECTIVE:  Isabel  is a 63 year old male who presents for: Follow-up from his hospitalization last month down in [place] for acute alcoholic induced pancreatitis.  He is known for heavy alcohol use.  Down for a month in [place] at his parents place where they winter.  Apparently been in a casino drinking all day gambling.  He developed severe abdominal pain.  Presented to the hospital with acute alcoholic pancreatitis.  Was admitted and was treated for alcohol withdrawal.  Labs came down his pain went away eating and drinking and was discharged.  To  "follow-up here.  He has been feeling fine.  Admits to cutting way down on alcohol use now.  Does not want to go through this again needs follow-up on his lipids and hypertension.  He is due for a colonoscopy.    History reviewed. No pertinent past medical history.  History reviewed. No pertinent surgical history.  Social History     Tobacco Use     Smoking status: Former Smoker     Packs/day: 0.50     Years: 50.00     Pack years: 25.00     Types: Cigarettes     Last attempt to quit: 2017     Years since quittin.3     Smokeless tobacco: Never Used   Substance Use Topics     Alcohol use: Yes     Comment: 2 cases per week      Current Outpatient Medications   Medication Sig Dispense Refill     CHANTIX 1 MG tablet TAKE ONE TABLET BY MOUTH TWICE DAILY 60 tablet 6     lisinopril (PRINIVIL/ZESTRIL) 20 MG tablet Take 1 tablet (20 mg) by mouth daily 90 tablet 3     metoprolol succinate ER (TOPROL-XL) 200 MG 24 hr tablet Take 1 tablet (200 mg) by mouth daily 90 tablet 3     nicotine polacrilex (EQ NICOTINE) 4 MG lozenge Place 1 lozenge (4 mg) inside cheek as needed for smoking cessation Place 1 lozenge inside cheek once daily. 108 tablet 3     omeprazole (PRILOSEC) 40 MG DR capsule Take 1 capsule (40 mg) by mouth daily Take 30-60 minutes before a meal. 90 capsule 3     pravastatin (PRAVACHOL) 40 MG tablet Take 1 tablet (40 mg) by mouth daily 90 tablet 3       REVIEW OF SYSTEMS:   5 point ROS negative except as noted above in HPI, including Gen., Resp, CV, GI &  system review.     OBJECTIVE:  Vitals: /74   Pulse 70   Temp 97.9  F (36.6  C) (Temporal)   Resp 16   Ht 1.79 m (5' 10.47\")   Wt 82.3 kg (181 lb 6.4 oz)   SpO2 98%   BMI 25.68 kg/m    BMI= Body mass index is 25.68 kg/m .  He is alert and appears well.  Throat clear.  Neck supple.  Lungs are clear.  Heart regular rhythm.  Abdomen soft bowel sounds present no tenderness.  Labs and imaging and reports were reviewed from his hospitalization.  Very " high lipase but otherwise reasonable labs.    ASSESSMENT:  #1 acute alcoholic pancreatitis #2 hypertension #3 hyperlipidemia #4 GERD #5 excessive alcohol use    PLAN:  Counseled him on his alcohol use.  He states he does not need help.  Renew his Prilosec pravastatin metoprolol and lisinopril.  Needs a colonoscopy and was willing to do one but never heard back when it was referred.  Probably be due for some lab work with lipids and such in the future here.        Hossein Tierney MD  [facility]

## 2019-04-29 ENCOUNTER — TELEPHONE (OUTPATIENT)
Dept: FAMILY MEDICINE | Facility: CLINIC | Age: 64
End: 2019-04-29

## 2019-04-29 NOTE — TELEPHONE ENCOUNTER
Spoke to patient, he states he is not wanting to schedule scope at this time, He will call another time.

## 2019-08-19 DIAGNOSIS — Z91.030 ALLERGY TO BEE STING: Primary | ICD-10-CM

## 2019-08-19 NOTE — TELEPHONE ENCOUNTER
Reason for Call:  Medication or medication refill:    Do you use a [facility]?  Name of the pharmacy and phone number for the current request:  [facility] - [phone]    Name of the medication requested: epi pen 0.3 mg    Other request: his epi pen is , from .  Was just stung by a bee and had a reaction.  Luckily didn't need it    Can we leave a detailed message on this number? YES    Phone number patient can be reached at: Cell number on file:    Telephone Information:   Mobile [phone]       Best Time:     Call taken on 2019 at 12:04 PM by Lalita Gayle

## 2019-08-20 RX ORDER — EPINEPHRINE 0.3 MG/.3ML
0.3 INJECTION SUBCUTANEOUS PRN
Qty: 2 EACH | Refills: 1 | Status: SHIPPED | OUTPATIENT
Start: 2019-08-20 | End: 2021-06-28

## 2020-04-06 DIAGNOSIS — Z72.0 TOBACCO ABUSE DISORDER: ICD-10-CM

## 2020-04-06 DIAGNOSIS — E78.5 HYPERLIPIDEMIA LDL GOAL <130: ICD-10-CM

## 2020-04-06 DIAGNOSIS — I10 BENIGN ESSENTIAL HYPERTENSION: ICD-10-CM

## 2020-04-06 DIAGNOSIS — K21.9 GASTROESOPHAGEAL REFLUX DISEASE WITHOUT ESOPHAGITIS: ICD-10-CM

## 2020-04-08 ENCOUNTER — TELEPHONE (OUTPATIENT)
Dept: FAMILY MEDICINE | Facility: CLINIC | Age: 65
End: 2020-04-08

## 2020-04-08 RX ORDER — LISINOPRIL 20 MG/1
TABLET ORAL
Qty: 90 TABLET | Refills: 0 | Status: SHIPPED | OUTPATIENT
Start: 2020-04-08 | End: 2020-07-23

## 2020-04-08 RX ORDER — METOPROLOL SUCCINATE 200 MG/1
TABLET, EXTENDED RELEASE ORAL
Qty: 90 TABLET | Refills: 0 | Status: SHIPPED | OUTPATIENT
Start: 2020-04-08 | End: 2020-07-23

## 2020-04-08 RX ORDER — OMEPRAZOLE 40 MG/1
CAPSULE, DELAYED RELEASE ORAL
Qty: 90 CAPSULE | Refills: 0 | Status: SHIPPED | OUTPATIENT
Start: 2020-04-08 | End: 2020-07-23

## 2020-04-08 RX ORDER — PRAVASTATIN SODIUM 40 MG
TABLET ORAL
Qty: 90 TABLET | Refills: 0 | Status: SHIPPED | OUTPATIENT
Start: 2020-04-08 | End: 2020-07-23

## 2020-04-08 RX ORDER — VARENICLINE TARTRATE 1 MG/1
TABLET, FILM COATED ORAL
Qty: 60 TABLET | Refills: 0 | Status: SHIPPED | OUTPATIENT
Start: 2020-04-08 | End: 2020-04-22

## 2020-04-08 NOTE — TELEPHONE ENCOUNTER
"METOPROLOL  Last Written Prescription Date:  4/26/19  Last Fill Quantity: 90,  # refills: 3   Last office visit: 4/26/2019 with prescribing provider:  Dr. Niall Fontenot Office Visit:  NONE    LISINOPRIL  Last Written Prescription Date:  4/26/19  Last Fill Quantity: 90,  # refills: 3   Last office visit: 4/26/2019 with prescribing provider:  Dr. Niall Fontenot Office Visit:  NONE    PRAVASTATIN  Last Written Prescription Date:  4/26/19  Last Fill Quantity: 90,  # refills: 3   Last office visit: 4/26/2019 with prescribing provider:  Dr. Niall Fontenot Office Visit:  NONE    OMEPRAZOLE  Last Written Prescription Date:  4/26/19  Last Fill Quantity: 990,  # refills: 3   Last office visit: 4/26/2019 with prescribing provider:  Dr. Niall Fontenot Office Visit:  NONE    CHANTIX  Last Written Prescription Date:  12/26/28  Last Fill Quantity: 60,  # refills: 6   Last office visit: 4/26/2019 with prescribing provider:   Dr. Niall Fontenot Office Visit:  NONE  Requested Prescriptions   Pending Prescriptions Disp Refills     metoprolol succinate ER (TOPROL-XL) 200 MG 24 hr tablet [Pharmacy Med Name: Metoprolol Succinate  MG Oral Tablet Extended Release 24 Hour] 90 tablet 0     Sig: Take 1 tablet by mouth once daily       Beta-Blockers Protocol Passed - 4/6/2020  9:13 AM        Passed - Blood pressure under 140/90 in past 12 months     BP Readings from Last 3 Encounters:   04/26/19 136/74   04/18/18 136/84   12/15/17 126/70           Passed - Patient is age 6 or older        Passed - Recent (12 mo) or future (30 days) visit within the authorizing provider's specialty     Patient has had an office visit with the authorizing provider or a provider within the authorizing providers department within the previous 12 mos or has a future within next 30 days. See \"Patient Info\" tab in inbasket, or \"Choose Columns\" in Meds & Orders section of the refill encounter.          Passed - Medication is active on med list       " "    lisinopril (ZESTRIL) 20 MG tablet [Pharmacy Med Name: Lisinopril 20 MG Oral Tablet] 90 tablet 0     Sig: Take 1 tablet by mouth once daily       ACE Inhibitors (Including Combos) Protocol Failed - 4/6/2020  9:13 AM        Failed - Normal serum creatinine on file in past 12 months     Recent Labs   Lab Test 12/15/17  1027   CR 0.88     Ok to refill medication if creatinine is low          Failed - Normal serum potassium on file in past 12 months     Recent Labs   Lab Test 12/15/17  1027   POTASSIUM 4.2           Passed - Blood pressure under 140/90 in past 12 months     BP Readings from Last 3 Encounters:   04/26/19 136/74   04/18/18 136/84   12/15/17 126/70           Passed - Recent (12 mo) or future (30 days) visit within the authorizing provider's specialty     Patient has had an office visit with the authorizing provider or a provider within the authorizing providers department within the previous 12 mos or has a future within next 30 days. See \"Patient Info\" tab in inbasket, or \"Choose Columns\" in Meds & Orders section of the refill encounter.         Passed - Medication is active on med list        Passed - Patient is age 18 or older           pravastatin (PRAVACHOL) 40 MG tablet [Pharmacy Med Name: Pravastatin Sodium 40 MG Oral Tablet] 90 tablet 0     Sig: Take 1 tablet by mouth once daily       Statins Protocol Failed - 4/6/2020  9:13 AM        Failed - LDL on file in past 12 months     Recent Labs   Lab Test 12/15/17  1027   LDL 69           Passed - No abnormal creatine kinase in past 12 months     No lab results found.         Passed - Recent (12 mo) or future (30 days) visit within the authorizing provider's specialty     Patient has had an office visit with the authorizing provider or a provider within the authorizing providers department within the previous 12 mos or has a future within next 30 days. See \"Patient Info\" tab in inbasket, or \"Choose Columns\" in Meds & Orders section of the refill " "encounter.          Passed - Medication is active on med list        Passed - Patient is age 18 or older           omeprazole (PRILOSEC) 40 MG DR capsule [Pharmacy Med Name: Omeprazole 40 MG Oral Capsule Delayed Release] 90 capsule 0     Sig: TAKE 1 CAPSULE BY MOUTH ONCE DAILY TAKE  30-60  MINUTES  BEFORE  A  MEAL       PPI Protocol Passed - 4/6/2020  9:13 AM        Passed - Not on Clopidogrel (unless Pantoprazole ordered)        Passed - No diagnosis of osteoporosis on record        Passed - Recent (12 mo) or future (30 days) visit within the authorizing provider's specialty     Patient has had an office visit with the authorizing provider or a provider within the authorizing providers department within the previous 12 mos or has a future within next 30 days. See \"Patient Info\" tab in [facility], or \"Choose Columns\" in Meds & Orders section of the refill encounter.          Passed - Medication is active on med list        Passed - Patient is age 18 or older           CHANTIX 1 MG tablet [Pharmacy Med Name: Chantix 1 MG Oral Tablet] 60 tablet 0     Sig: Take 1 tablet by mouth twice daily       Partial Cholinergic Nicotinic Agonist Agents Passed - 4/6/2020  9:13 AM        Passed - Blood pressure under 140/90 in past 12 months     BP Readings from Last 3 Encounters:   04/26/19 136/74   04/18/18 136/84   12/15/17 126/70           Passed - Recent (12 mo) or future (30 days) visit within the authorizing provider's specialty     Patient has had an office visit with the authorizing provider or a provider within the authorizing providers department within the previous 12 mos or has a future within next 30 days. See \"Patient Info\" tab in [facility], or \"Choose Columns\" in Meds & Orders section of the refill encounter.          Passed - Medication is active on med list        Passed - Patient is 18 years of age or older           Routing refill request to provider for review/approval because:  A break in medication  Patient needs " to be seen because it has been more than 1 year since last office visit.  BAIRON Hennessy

## 2020-04-08 NOTE — TELEPHONE ENCOUNTER
Reason for Call:  Medication or medication refill:    Do you use a [facility]?  Name of the pharmacy and phone number for the current request:  [facility] - [phone]    Name of the medication requested: CHANTIX 1 MG tablet  metoprolol succinate ER (TOPROL-XL) 200 MG 24 hr tablet  lisinopril (PRINIVIL/ZESTRIL) 20 MG tablet  pravastatin (PRAVACHOL) 40 MG tablet  omeprazole (PRILOSEC) 40 MG DR capsule   Clotrim-wan lotion     Other request: Patient tried to call these in don't see any other encounters please advise     Can we leave a detailed message on this number? NO    Phone number patient can be reached at: Cell number on file:    Telephone Information:   Mobile [phone]       Best Time: Anytime     Call taken on 4/8/2020 at 11:19 AM by Miranda Seipiel Vaccari

## 2020-04-20 DIAGNOSIS — Z72.0 TOBACCO ABUSE DISORDER: ICD-10-CM

## 2020-04-22 RX ORDER — VARENICLINE TARTRATE 1 MG/1
TABLET, FILM COATED ORAL
Qty: 60 TABLET | Refills: 2 | Status: SHIPPED | OUTPATIENT
Start: 2020-04-22 | End: 2021-06-28

## 2020-07-23 ENCOUNTER — OFFICE VISIT (OUTPATIENT)
Dept: FAMILY MEDICINE | Facility: CLINIC | Age: 65
End: 2020-07-23
Payer: COMMERCIAL

## 2020-07-23 VITALS
OXYGEN SATURATION: 100 % | HEIGHT: 70 IN | WEIGHT: 178 LBS | TEMPERATURE: 97.2 F | HEART RATE: 66 BPM | BODY MASS INDEX: 25.48 KG/M2 | SYSTOLIC BLOOD PRESSURE: 148 MMHG | RESPIRATION RATE: 16 BRPM | DIASTOLIC BLOOD PRESSURE: 84 MMHG

## 2020-07-23 DIAGNOSIS — I10 BENIGN ESSENTIAL HYPERTENSION: ICD-10-CM

## 2020-07-23 DIAGNOSIS — Z12.5 SCREENING FOR PROSTATE CANCER: ICD-10-CM

## 2020-07-23 DIAGNOSIS — E78.5 HYPERLIPIDEMIA LDL GOAL <130: ICD-10-CM

## 2020-07-23 DIAGNOSIS — Z00.01 ENCOUNTER FOR GENERAL ADULT MEDICAL EXAMINATION WITH ABNORMAL FINDINGS: Primary | ICD-10-CM

## 2020-07-23 DIAGNOSIS — K21.9 GASTROESOPHAGEAL REFLUX DISEASE WITHOUT ESOPHAGITIS: ICD-10-CM

## 2020-07-23 DIAGNOSIS — L21.9 SEBORRHEIC DERMATITIS: ICD-10-CM

## 2020-07-23 DIAGNOSIS — Z72.0 TOBACCO ABUSE: ICD-10-CM

## 2020-07-23 PROCEDURE — 99396 PREV VISIT EST AGE 40-64: CPT | Performed by: FAMILY MEDICINE

## 2020-07-23 PROCEDURE — 99214 OFFICE O/P EST MOD 30 MIN: CPT | Mod: 25 | Performed by: FAMILY MEDICINE

## 2020-07-23 RX ORDER — CLOTRIMAZOLE AND BETAMETHASONE DIPROPIONATE 10; .5 MG/ML; MG/ML
LOTION TOPICAL 2 TIMES DAILY
Qty: 30 ML | Refills: 3 | Status: SHIPPED | OUTPATIENT
Start: 2020-07-23 | End: 2021-06-28

## 2020-07-23 RX ORDER — OMEPRAZOLE 40 MG/1
CAPSULE, DELAYED RELEASE ORAL
Qty: 90 CAPSULE | Refills: 3 | Status: SHIPPED | OUTPATIENT
Start: 2020-07-23 | End: 2021-06-28

## 2020-07-23 RX ORDER — PRAVASTATIN SODIUM 40 MG
40 TABLET ORAL DAILY
Qty: 90 TABLET | Refills: 3 | Status: SHIPPED | OUTPATIENT
Start: 2020-07-23 | End: 2021-06-28

## 2020-07-23 RX ORDER — VARENICLINE TARTRATE 1 MG/1
1 TABLET, FILM COATED ORAL 2 TIMES DAILY
Qty: 60 TABLET | Refills: 6 | Status: SHIPPED | OUTPATIENT
Start: 2020-07-23 | End: 2021-06-28

## 2020-07-23 RX ORDER — METOPROLOL SUCCINATE 200 MG/1
200 TABLET, EXTENDED RELEASE ORAL DAILY
Qty: 90 TABLET | Refills: 3 | Status: SHIPPED | OUTPATIENT
Start: 2020-07-23 | End: 2021-06-28

## 2020-07-23 RX ORDER — LISINOPRIL 20 MG/1
20 TABLET ORAL DAILY
Qty: 90 TABLET | Refills: 3 | Status: SHIPPED | OUTPATIENT
Start: 2020-07-23 | End: 2021-06-28

## 2020-07-23 ASSESSMENT — MIFFLIN-ST. JEOR: SCORE: 1613.65

## 2020-07-23 ASSESSMENT — PAIN SCALES - GENERAL: PAINLEVEL: NO PAIN (0)

## 2020-07-23 NOTE — PROGRESS NOTES
SUBJECTIVE:   CC: Isabel Alvarez is an 64 year old male who presents for preventative health visit.     Healthy Habits:     Getting at least 3 servings of Calcium per day:  Yes    Bi-annual eye exam:  Yes    Dental care twice a year:  NO    Sleep apnea or symptoms of sleep apnea:  None    Diet:  Regular (no restrictions)    Frequency of exercise:  None (stays active)    Duration of exercise:  N/A    Taking medications regularly:  No    Barriers to taking medications:  None    Medication side effects:  None    PHQ-2 Total Score: 0    Additional concerns today:  Yes      In for a physical with a number of issues.  #1 he needs refill on his medications and overdue for blood work for hypertension and hyperlipidemia.  History of MI years ago.    Has trouble with GERD.  But now he states he cannot eat certain foods.  He has frequent emesis.  Steak or beef makes him have emesis.    Longtime smoker.  Had Chantix worked but then he was denied any more until he was seen so he went back to smoking.  Wants to get back on this.    Has seborrheic dermatitis of his scalp uses Lotrisone lotion which is very helpful.        Today's PHQ-2 Score:   PHQ-2 (  [employer]) 2020   Q1: Little interest or pleasure in doing things 0   Q2: Feeling down, depressed or hopeless 0   PHQ-2 Score 0       Abuse: Current or Past(Physical, Sexual or Emotional)- No  Do you feel safe in your environment? Yes    Have you ever done Advance Care Planning? (For example, a Health Directive, POLST, or a discussion with a medical provider or your loved ones about your wishes): No, advance care planning information given to patient to review.  Advanced care planning was discussed at today's visit.    Social History     Tobacco Use     Smoking status: Current Some Day Smoker     Packs/day: 0.50     Years: 50.00     Pack years: 25.00     Types: Cigarettes     Last attempt to quit: 2017     Years since quittin.5     Smokeless tobacco: Never Used  "  Substance Use Topics     Alcohol use: Yes     Comment: 2 cases per week      If you drink alcohol do you typically have >3 drinks per day or >7 drinks per week? No    No flowsheet data found.    Last PSA:   PSA   Date Value Ref Range Status   12/15/2017 1.63 0 - 4 ug/L Final     Comment:     Assay Method:  Chemiluminescence using Siemens Vista analyzer       Reviewed orders with patient. Reviewed health maintenance and updated orders accordingly - Yes      Reviewed and updated as needed this visit by clinical staff  Tobacco  Allergies  Meds  Soc Hx        Reviewed and updated as needed this visit by Provider        No past medical history on file.   No past surgical history on file.    Review of Systems  CONSTITUTIONAL: NEGATIVE for fever, chills, change in weight  INTEGUMENTARY/SKIN: See above regarding the scalp  EYES: NEGATIVE for vision changes or irritation  ENT: NEGATIVE for ear, mouth and throat problems  RESP: NEGATIVE for significant cough or SOB  CV: NEGATIVE for chest pain, palpitations or peripheral edema  GI: See above   male: negative for dysuria, hematuria, decreased urinary stream, erectile dysfunction, urethral discharge  MUSCULOSKELETAL: NEGATIVE for significant arthralgias or myalgia  NEURO: NEGATIVE for weakness, dizziness or paresthesias  PSYCHIATRIC: NEGATIVE for changes in mood or affect    OBJECTIVE:   BP (!) 148/84   Pulse 66   Temp 97.2  F (36.2  C) (Temporal)   Resp 16   Ht 1.778 m (5' 10\")   Wt 80.7 kg (178 lb)   SpO2 100%   BMI 25.54 kg/m      Physical Exam  GENERAL: healthy, alert and no distress  EYES: Eyes grossly normal to inspection, PERRL and conjunctivae and sclerae normal  HENT: ear canals and TM's normal, nose and mouth without ulcers or lesions  NECK: no adenopathy, no asymmetry, masses, or scars and thyroid normal to palpation  RESP: Bilateral wheezing noted.  CV: regular rate and rhythm, normal S1 S2, no S3 or S4, no murmur, click or rub, no peripheral edema and " peripheral pulses strong  ABDOMEN: soft, nontender, no hepatosplenomegaly, no masses and bowel sounds normal  MS: no gross musculoskeletal defects noted, no edema  SKIN: no suspicious lesions or rashes  NEURO: Normal strength and tone, mentation intact and speech normal  PSYCH: mentation appears normal, affect normal/bright    Diagnostic Test Results:  Labs reviewed in Epic  none     ASSESSMENT/PLAN:   1. Encounter for general adult medical examination with abnormal findings  See discussion below.  We will doing a Cologuard on him he refuses colonoscopy.  Does not want to update any immunizations.    2. Benign essential hypertension  Blood pressures a little high and watch his salt intake continue on his medications.  - lisinopril (ZESTRIL) 20 MG tablet; Take 1 tablet (20 mg) by mouth daily  Dispense: 90 tablet; Refill: 3  - metoprolol succinate ER (TOPROL-XL) 200 MG 24 hr tablet; Take 1 tablet (200 mg) by mouth daily  Dispense: 90 tablet; Refill: 3  - **Comprehensive metabolic panel FUTURE anytime; Future  - **CBC with platelets FUTURE anytime; Future    3. Gastroesophageal reflux disease without esophagitis  His discussion of his stomach issues is a bit concerning.  We need to do an EGD on him.  We will continue on his Prilosec and go from there.  - omeprazole (PRILOSEC) 40 MG DR capsule; TAKE 1 CAPSULE BY MOUTH ONCE DAILY TAKE  30-60  MINUTES  BEFORE  A  MEAL  Dispense: 90 capsule; Refill: 3  - GASTROENTEROLOGY ADULT REF PROCEDURE ONLY    4. Hyperlipidemia LDL goal <130  Long overdue for blood work he will come in fasting.  - pravastatin (PRAVACHOL) 40 MG tablet; Take 1 tablet (40 mg) by mouth daily  Dispense: 90 tablet; Refill: 3  - Lipid panel reflex to direct LDL Fasting; Future    5. Seborrheic dermatitis  He is out of this it has worked in the past he does not take it continuously.  - clotrimazole-betamethasone (LOTRISONE) 1-0.05 % external lotion; Apply topically 2 times daily  Dispense: 30 mL; Refill:  "3    6. Tobacco abuse  Unfortunately was denied refill of his Chantix because he had not been seen for a while he got back into smoking.  He wants to start over again.  He is certainly a candidate for a low-dose CT scan of the chest.  - varenicline (CHANTIX ROMERO) 0.5 MG X 11 & 1 MG X 42 tablet; Take 0.5 mg tab daily for 3 days, THEN 0.5 mg tab twice daily for 4 days, THEN 1 mg twice daily.  Dispense: 53 tablet; Refill: 0  - varenicline (CHANTIX CONTINUING MONTH ROMERO) 1 MG tablet; Take 1 tablet (1 mg) by mouth 2 times daily  Dispense: 60 tablet; Refill: 6  - CT Chest Lung Cancer Scrn Low Dose wo; Future    7. Screening for prostate cancer  We will notify him with results.  - **Prostate spec antigen screen FUTURE anytime; Future    COUNSELING:   Reviewed preventive health counseling, as reflected in patient instructions       Regular exercise       Healthy diet/nutrition       Vision screening    Estimated body mass index is 25.54 kg/m  as calculated from the following:    Height as of this encounter: 1.778 m (5' 10\").    Weight as of this encounter: 80.7 kg (178 lb).          reports that he has been smoking cigarettes. He has a 25.00 pack-year smoking history. He has never used smokeless tobacco.  Tobacco Cessation Action Plan: Pharmacotherapies : Chantix    Counseling Resources:  ATP IV Guidelines  Pooled Cohorts Equation Calculator  FRAX Risk Assessment  ICSI Preventive Guidelines  Dietary Guidelines for Americans, 2010  USDA's MyPlate  ASA Prophylaxis  Lung CA Screening    Hossein Tierney MD  [facility]  "

## 2020-07-29 ENCOUNTER — HOSPITAL ENCOUNTER (OUTPATIENT)
Dept: CT IMAGING | Facility: CLINIC | Age: 65
Discharge: HOME | End: 2020-07-29
Attending: FAMILY MEDICINE | Admitting: FAMILY MEDICINE
Payer: COMMERCIAL

## 2020-07-29 DIAGNOSIS — Z12.5 SCREENING FOR PROSTATE CANCER: ICD-10-CM

## 2020-07-29 DIAGNOSIS — Z72.0 TOBACCO ABUSE: ICD-10-CM

## 2020-07-29 DIAGNOSIS — E78.5 HYPERLIPIDEMIA LDL GOAL <130: ICD-10-CM

## 2020-07-29 DIAGNOSIS — I10 BENIGN ESSENTIAL HYPERTENSION: ICD-10-CM

## 2020-07-29 LAB
ALBUMIN SERPL-MCNC: 4.2 G/DL (ref 3.4–5)
ALP SERPL-CCNC: 126 U/L (ref 40–150)
ALT SERPL W P-5'-P-CCNC: 109 U/L (ref 0–70)
ANION GAP SERPL CALCULATED.3IONS-SCNC: 7 MMOL/L (ref 3–14)
AST SERPL W P-5'-P-CCNC: 171 U/L (ref 0–45)
BILIRUB SERPL-MCNC: 0.7 MG/DL (ref 0.2–1.3)
BUN SERPL-MCNC: 6 MG/DL (ref 7–30)
CALCIUM SERPL-MCNC: 9 MG/DL (ref 8.5–10.1)
CHLORIDE SERPL-SCNC: 95 MMOL/L (ref 94–109)
CHOLEST SERPL-MCNC: 164 MG/DL
CO2 SERPL-SCNC: 26 MMOL/L (ref 20–32)
CREAT SERPL-MCNC: 0.82 MG/DL (ref 0.66–1.25)
ERYTHROCYTE [DISTWIDTH] IN BLOOD BY AUTOMATED COUNT: 13 % (ref 10–15)
GFR SERPL CREATININE-BSD FRML MDRD: >90 ML/MIN/{1.73_M2}
GLUCOSE SERPL-MCNC: 87 MG/DL (ref 70–99)
HCT VFR BLD AUTO: 39.4 % (ref 40–53)
HDLC SERPL-MCNC: 84 MG/DL
HGB BLD-MCNC: 13.9 G/DL (ref 13.3–17.7)
LDLC SERPL CALC-MCNC: 63 MG/DL
MCH RBC QN AUTO: 33.2 PG (ref 26.5–33)
MCHC RBC AUTO-ENTMCNC: 35.3 G/DL (ref 31.5–36.5)
MCV RBC AUTO: 94 FL (ref 78–100)
NONHDLC SERPL-MCNC: 80 MG/DL
PLATELET # BLD AUTO: 185 10E9/L (ref 150–450)
POTASSIUM SERPL-SCNC: 4.9 MMOL/L (ref 3.4–5.3)
PROT SERPL-MCNC: 8.3 G/DL (ref 6.8–8.8)
PSA SERPL-ACNC: 1.18 UG/L (ref 0–4)
RBC # BLD AUTO: 4.19 10E12/L (ref 4.4–5.9)
SODIUM SERPL-SCNC: 128 MMOL/L (ref 133–144)
TRIGL SERPL-MCNC: 86 MG/DL
WBC # BLD AUTO: 5.7 10E9/L (ref 4–11)

## 2020-07-29 PROCEDURE — 36415 COLL VENOUS BLD VENIPUNCTURE: CPT | Performed by: FAMILY MEDICINE

## 2020-07-29 PROCEDURE — 80053 COMPREHEN METABOLIC PANEL: CPT | Performed by: FAMILY MEDICINE

## 2020-07-29 PROCEDURE — G0297 LDCT FOR LUNG CA SCREEN: HCPCS

## 2020-07-29 PROCEDURE — 85027 COMPLETE CBC AUTOMATED: CPT | Performed by: FAMILY MEDICINE

## 2020-07-29 PROCEDURE — 80061 LIPID PANEL: CPT | Performed by: FAMILY MEDICINE

## 2020-07-29 PROCEDURE — G0103 PSA SCREENING: HCPCS | Performed by: FAMILY MEDICINE

## 2020-07-31 ENCOUNTER — TELEPHONE (OUTPATIENT)
Dept: FAMILY MEDICINE | Facility: CLINIC | Age: 65
End: 2020-07-31

## 2020-07-31 DIAGNOSIS — I10 BENIGN ESSENTIAL HYPERTENSION: Primary | ICD-10-CM

## 2020-07-31 NOTE — RESULT ENCOUNTER NOTE
Labs show your PSA test was normal.  Blood count was pretty much normal.  Cholesterol was good at 164 chemistry panel showed slightly low sodium you have had this before but your blood sugar was normal but several liver function tests were off.  This can be from too much alcohol use  and or Tylenol use..  Should recheck this in 3 to 6 months.

## 2020-07-31 NOTE — TELEPHONE ENCOUNTER
Please sign order for labs to recheck in 3-6 months. Lissa Claudio CMA ([facility])

## 2020-08-05 ENCOUNTER — TELEPHONE (OUTPATIENT)
Dept: FAMILY MEDICINE | Facility: CLINIC | Age: 65
End: 2020-08-05

## 2020-08-05 ENCOUNTER — HOSPITAL ENCOUNTER (OUTPATIENT)
Facility: CLINIC | Age: 65
End: 2020-08-05
Attending: SURGERY | Admitting: SURGERY
Payer: COMMERCIAL

## 2020-08-05 DIAGNOSIS — Z11.59 ENCOUNTER FOR SCREENING FOR OTHER VIRAL DISEASES: Primary | ICD-10-CM

## 2020-08-05 NOTE — LETTER
[address] [address]  [phone]        August 5, 2020    Isabel Alvarez  [id number] [facility] [address]

## 2020-08-05 NOTE — TELEPHONE ENCOUNTER
Date of colonoscopy/EGD: 8/13/20  Surgeon: Dr. Gandhi  Prep:EGD  Packet:Colonoscopy/EGD instructions mailed to patient's home address.   Date: 8/5/2020      Surgery Scheduler

## 2020-08-05 NOTE — LETTER
Upper Endoscopy    Patient Name   Isabel Alvarez   Procedure Date   8/13/20 Arrival Time   7:00am Procedure Time   8:00am   Physician   Dr. Gandhi   Location     Dr. Gandhi   Other Instructions Occasionally procedure times need to be changed. In the event your procedure time needs to be changed, you will receive a telephone call from a nurse informing you of the change as well as any other instructions.     Review the preparation schedule below for the five days preceding your procedure.     If you have any questions, please call [phone].  5 DAYS PRIOR   *CHECK WITH YOUR INSURANCE REGARDING COVERAGE PRIOR TO PROCEDURE.  If you take Coumadin (Warfarin), Plavix, Ticlid, Aggrenox:, insulin, diabetic pills and/or iron products contact your doctor for specific instructions.  Have INR checked the day before the procedure.  Please remember to arrange a responsible adult to accompany you home.   must be present upon discharge.     1 DAY PRIOR   Eat normally up until midnight.  - You may drink small amounts of clear liquids up until 4 hours prior to your arrival.  **Please see Clear Liquid Diet Sheet for suggested liquids.again.     PROCEDURE DAY    From midnight until 3 hours prior to your procedure, you may drink small amounts of clear liquids.  Do not take your morning medications until after you have completed your procedure.  Bring a list of your medications with you on the day of your procedure.     *Reminder:  Have transportation arranged to take you home.   must accompany you to the procedure.  Do not plan to drive or operate vehicles or machinery the day of your exam.                               CLEAR LIQUID DIET  The clear liquid diet includes foods that are free of fat and fiber. They will liquefy to clear liquid at room temperature. No red or purple colored juices or Jell-O s. No dairy products. No alcoholic beverages. No foods containing seeds 2 days prior to procedure    TYPE OF FOOD USE ONLY  THESE FOODS   Beverages Coffee, Decaffeinated coffee, Tea (without milk or non-dairy creamer)  Carbonated beverages (pop)  Water  Sports Drinks   Desserts Jello (except red or purple)  Fruit ice  Popsicle   Fruit and Fruit Juices Citrus juices, strained  Fruit nectars, strained  Apple juice  Fruit drinks   Soups Broth or Bouillon  Consommé  Meat stock, strained  Vegetable broth, strained   Sweets Hard candy, plain  Sugar   Miscellaneous Flavorings  Salt       Directions to [facility]    From the North: Take the [address]nd [address] exit off of [address] (on the south side of [place]).  Turn left on [address]  Turn left at the first stoplight (at [employer]).  The hospital and clinic is the third building on the left.     From the South: Follow [address] north to the first [place] exit.  Exit on [address] following it to the right.  Turn left at the first stoplight.  The hospital and clinic is the third building on the left.    From the East: Following [address] west, turn left at the stoplight onto [address] Follow [address] through [place].  Take a right at the light on [address] (at [employer]).  The hospital and clinic is the third building on the left.    From the West: Following [address] going east, turn south on [address].  Take the [address] exit off of [address] (on the south side of [place]).  Follow [address] through [place] to the stoplight on [address] (at [employer]). Take a left.  The hospital and clinic is the third building on the left.                  UPPER ENDOSCOPY INFORMATION    WHAT IS AN UPPER ENDOSCOPY?  Upper endoscopy (also known as an upper GI endoscopy, esophagogastro-duodenoscopy [EGD], or panendoscopy) is a procedure that enables your physician to examine the lining of the upper part of your gastrointestinal tract, ie. The esophagus (swallowing tube), stomach, and duodenum (first portion of the small intestine) using a thin  flexible tube with its own lens and light source.    Upper endoscopy is usually performed to evaluate symptoms of persistent upper abdominal pain, nausea, vomiting or difficulty swallowing.  It is also the best test for finding the cause of bleeding from the upper gastrointestinal tract.    Upper endoscopy is more accurate than x-ray films for detecting inflammation, ulcers or tumors of the esophagus, stomach and duodenum.  Upper endoscopy can detect early cancer and can distinguish between benign (non-cancerous) and malignant (cancerous) conditions when biopsies (small tissue samples) of suspicious areas are obtained.  Biopsies are taken for many reasons and do not necessarily mean that cancer is suspected.  A cytology test (introduction of a small brush to collect cells) may also be performed.    Upper endoscopy is also used to treat conditions present in the upper gastrointestinal tract.  A variety of instruments can be passed through the endoscope that allow many abnormalities to be treated directly with little or no discomfort.  This may include stretching narrowed areas, removing polyps (usually benign growths) or swallowed objects, or treating upper gastrointestinal bleeding.  Safe and effective endoscopic control of bleeding has reduced the need for transfusions and surgery in many patients.    For the best and safest examination, the stomach must be completely empty.  You should have nothing to eat or drink, including water, for approximately 4 hours prior to your examination.    WHAT CAN BE EXPECTED DURING THE UPPER ENDOSCOPY?  Your doctor will review with you why upper endoscopy is being performed, whether any alternative tests are available, and possible complications from the procedure.  Your throat will be sprayed with a local anesthetic before the procedure begins and you may be given medication through a vein to help you relax during the procedure.  While you are in a comfortable position on your  side, the endoscope is passed through the mouth and then is passed in turn through the esophagus, stomach, and duodenum.  The endoscope does not interfere with your breathing during the test.  Most patients consider the procedure to be only slightly uncomfortable and many patients fall asleep during the procedure.    WHAT HAPPENS AFTER THE UPPER ENDOSCOPY?  After the procedure, you will be monitored in the endoscopy area until most of the effects of the medication have work off.  Your throat may be a little sore for a while, and you may feel bloated right after the procedure because of the air introduced into your stomach during the test.  You will be able to resume your diet after you leave unless you are instructed otherwise.    If you receive sedation, you will need to arrange to have a responsible adult drive and accompany you home from the examination because the sedative may affect your judgment and reflexes for the rest of the day.  You will not be allowed to take a taxi or bus as transportation home.  If you receive sedation, you will not be allowed to drive after the procedure even though you may not feel tired.            WHAT ARE THE POSSIBLE COMPLICATIONS OF UPPER ENDOSCOPY?  Endoscopy is generally safe.  Complications can occur but are rare when the test is performed by physicians with specialized training and experience in this procedure.  Bleeding may occur from a biopsy site or where a polyp was removed.  It is usually minimal and rarely requires blood transfusions or surgery.  Localized irritation of the vein where the medication was injected may rarely cause a tender lump lasting for several weeks, but this will go away.  Applying heat packs or hot moist towels may help relieve discomfort.  Other potential risks include a reaction to the sedatives used and complications from underlying medical conditions.  Major complication, eg, perforation (a tear that might require surgery for repair) are very  uncommon.      It is important for you to recognize early signs of any possible complication.  If you begin to run a fever after the test, begin to have trouble swallowing, or have increasing throat, chest or abdominal pain, let your doctor know about it promptly.

## 2020-08-10 DIAGNOSIS — Z11.59 ENCOUNTER FOR SCREENING FOR OTHER VIRAL DISEASES: ICD-10-CM

## 2020-08-10 PROCEDURE — U0003 INFECTIOUS AGENT DETECTION BY NUCLEIC ACID (DNA OR RNA); SEVERE ACUTE RESPIRATORY SYNDROME CORONAVIRUS 2 (SARS-COV-2) (CORONAVIRUS DISEASE [COVID-19]), AMPLIFIED PROBE TECHNIQUE, MAKING USE OF HIGH THROUGHPUT TECHNOLOGIES AS DESCRIBED BY CMS-2020-01-R: HCPCS | Performed by: SURGERY

## 2020-08-11 LAB
SARS-COV-2 RNA SPEC QL NAA+PROBE: NOT DETECTED
SPECIMEN SOURCE: NORMAL

## 2020-08-17 ENCOUNTER — TELEPHONE (OUTPATIENT)
Dept: FAMILY MEDICINE | Facility: CLINIC | Age: 65
End: 2020-08-17

## 2020-08-17 NOTE — TELEPHONE ENCOUNTER
Central Prior Authorization Team   Phone: [phone]    PA Initiation    Medication: clotrimazole-betamethasone (LOTRISONE) 1-0.05 % external lotion  Insurance Company: [employer] - Phone [phone] Fax [phone]  Pharmacy Filling the Rx: [facility] [address] [place] [address] [address] [facility]  Filling Pharmacy Phone: [phone]  Filling Pharmacy Fax: [phone]  Start Date: 8/17/2020

## 2020-08-17 NOTE — TELEPHONE ENCOUNTER
Prior Authorization Retail Medication Request    Medication/Dose: clotrimazole-betamethasone (LOTRISONE) 1-0.05 % external lotion    KEY: [id number]    ICD code (if different than what is on RX):    Previously Tried and Failed:    Rationale:      Insurance Name:  [facility] ([facility])  Insurance ID: [id number]      Pharmacy Information (if different than what is on RX)  Name:    Phone:

## 2020-08-18 NOTE — TELEPHONE ENCOUNTER
Prior Authorization Approval    Authorization Effective Date: 7/18/2020  Authorization Expiration Date: 8/17/2021  Medication: clotrimazole-betamethasone (LOTRISONE) 1-0.05 % external lotion-APPROVED  Approved Dose/Quantity:    Reference #:     Insurance Company: [employer] - Phone [phone] Fax [phone]  Expected CoPay:       CoPay Card Available:      Foundation Assistance Needed:    Which Pharmacy is filling the prescription (Not needed for infusion/clinic administered): [facility] [address] [place] [address] [address] [place] [address]  Pharmacy Notified: Yes  Patient Notified: Yes   **Instructed pharmacy to notify patient when script is ready to /ship.**

## 2020-10-02 ENCOUNTER — TRANSFERRED RECORDS (OUTPATIENT)
Dept: HEALTH INFORMATION MANAGEMENT | Facility: CLINIC | Age: 65
End: 2020-10-02

## 2020-10-02 LAB — COLOGUARD-ABSTRACT: NEGATIVE

## 2021-04-19 ENCOUNTER — IMMUNIZATION (OUTPATIENT)
Dept: FAMILY MEDICINE | Facility: CLINIC | Age: 66
End: 2021-04-19
Payer: COMMERCIAL

## 2021-04-19 PROCEDURE — 0011A PR COVID VAC MODERNA 100 MCG/0.5 ML IM: CPT

## 2021-04-19 PROCEDURE — 91301 PR COVID VAC MODERNA 100 MCG/0.5 ML IM: CPT

## 2021-05-17 ENCOUNTER — IMMUNIZATION (OUTPATIENT)
Dept: FAMILY MEDICINE | Facility: CLINIC | Age: 66
End: 2021-05-17
Attending: FAMILY MEDICINE
Payer: COMMERCIAL

## 2021-05-17 PROCEDURE — 0012A PR COVID VAC MODERNA 100 MCG/0.5 ML IM: CPT

## 2021-05-17 PROCEDURE — 91301 PR COVID VAC MODERNA 100 MCG/0.5 ML IM: CPT

## 2021-06-28 ENCOUNTER — OFFICE VISIT (OUTPATIENT)
Dept: FAMILY MEDICINE | Facility: CLINIC | Age: 66
End: 2021-06-28
Payer: COMMERCIAL

## 2021-06-28 VITALS
HEIGHT: 70 IN | RESPIRATION RATE: 20 BRPM | OXYGEN SATURATION: 99 % | SYSTOLIC BLOOD PRESSURE: 160 MMHG | BODY MASS INDEX: 24.52 KG/M2 | TEMPERATURE: 97 F | HEART RATE: 74 BPM | WEIGHT: 171.25 LBS | DIASTOLIC BLOOD PRESSURE: 100 MMHG

## 2021-06-28 DIAGNOSIS — K21.9 GASTROESOPHAGEAL REFLUX DISEASE WITHOUT ESOPHAGITIS: ICD-10-CM

## 2021-06-28 DIAGNOSIS — E78.5 HYPERLIPIDEMIA LDL GOAL <130: ICD-10-CM

## 2021-06-28 DIAGNOSIS — Z91.030 ALLERGY TO BEE STING: ICD-10-CM

## 2021-06-28 DIAGNOSIS — L21.9 SEBORRHEIC DERMATITIS: ICD-10-CM

## 2021-06-28 DIAGNOSIS — I10 BENIGN ESSENTIAL HYPERTENSION: ICD-10-CM

## 2021-06-28 DIAGNOSIS — Z00.00 HEALTHCARE MAINTENANCE: Primary | ICD-10-CM

## 2021-06-28 PROCEDURE — 99214 OFFICE O/P EST MOD 30 MIN: CPT | Performed by: NURSE PRACTITIONER

## 2021-06-28 RX ORDER — OMEPRAZOLE 40 MG/1
CAPSULE, DELAYED RELEASE ORAL
Qty: 90 CAPSULE | Refills: 3 | Status: SHIPPED | OUTPATIENT
Start: 2021-06-28 | End: 2022-06-20

## 2021-06-28 RX ORDER — EPINEPHRINE 0.3 MG/.3ML
0.3 INJECTION SUBCUTANEOUS PRN
Qty: 2 EACH | Refills: 1 | Status: SHIPPED | OUTPATIENT
Start: 2021-06-28

## 2021-06-28 RX ORDER — METOPROLOL SUCCINATE 200 MG/1
200 TABLET, EXTENDED RELEASE ORAL DAILY
Qty: 90 TABLET | Refills: 3 | Status: SHIPPED | OUTPATIENT
Start: 2021-06-28 | End: 2022-06-20

## 2021-06-28 RX ORDER — PRAVASTATIN SODIUM 40 MG
40 TABLET ORAL DAILY
Qty: 90 TABLET | Refills: 3 | Status: SHIPPED | OUTPATIENT
Start: 2021-06-28 | End: 2022-05-27

## 2021-06-28 RX ORDER — LISINOPRIL 20 MG/1
20 TABLET ORAL DAILY
Qty: 90 TABLET | Refills: 3 | Status: SHIPPED | OUTPATIENT
Start: 2021-06-28 | End: 2022-06-20

## 2021-06-28 RX ORDER — CLOTRIMAZOLE AND BETAMETHASONE DIPROPIONATE 10; .5 MG/ML; MG/ML
LOTION TOPICAL 2 TIMES DAILY
Qty: 30 ML | Refills: 3 | Status: SHIPPED | OUTPATIENT
Start: 2021-06-28 | End: 2024-01-01

## 2021-06-28 ASSESSMENT — MIFFLIN-ST. JEOR: SCORE: 1571.68

## 2021-06-28 ASSESSMENT — PAIN SCALES - GENERAL: PAINLEVEL: NO PAIN (0)

## 2021-06-28 NOTE — PROGRESS NOTES
Assessment & Plan     Seborrheic dermatitis  - Lotion works well for exacerbations, prefers the lotion over cream when rash gets up into hair.   - clotrimazole-betamethasone (LOTRISONE) 1-0.05 % external lotion; Apply topically 2 times daily    Allergy to bee sting  - Needs a current prescriptions.   - EPINEPHrine (ANY BX GENERIC EQUIV) 0.3 MG/0.3ML injection 2-pack; Inject 0.3 mLs (0.3 mg) into the muscle as needed for anaphylaxis    Benign essential hypertension  - Takes regularly at home and well within range, high in clinic due to white coat syndrome.   - lisinopril (ZESTRIL) 20 MG tablet; Take 1 tablet (20 mg) by mouth daily  - metoprolol succinate ER (TOPROL-XL) 200 MG 24 hr tablet; Take 1 tablet (200 mg) by mouth daily    Gastroesophageal reflux disease without esophagitis  - Stable will continue with current plan of care.   - omeprazole (PRILOSEC) 40 MG DR capsule; TAKE 1 CAPSULE BY MOUTH ONCE DAILY TAKE  30-60  MINUTES  BEFORE  A  MEAL    Hyperlipidemia LDL goal <130  - Labs were great last fall, tolerating the medication, will continue with current plan of care.   - pravastatin (PRAVACHOL) 40 MG tablet; Take 1 tablet (40 mg) by mouth daily    Healthcare maintenance  - Reviewed. Will get him set up for his preventative care visit. He refuses all vaccines.   - REVIEW OF HEALTH MAINTENANCE PROTOCOL ORDERS      30  minutes spent on the date of the encounter doing chart review, history and exam, documentation and further activities per the note       Tobacco Cessation:   reports that he has been smoking cigarettes. He has a 25.00 pack-year smoking history. He has never used smokeless tobacco.  Tobacco Cessation Action Plan: Information offered: Patient not interested at this time        Return in about 4 months (around 10/28/2021) for Physical Exam, Lab Work.    Pacheco Grant NP  [facility] CHICO Hall is a 65 year old who presents for the following health issues  "    HPI     Hyperlipidemia Follow-Up      Are you regularly taking any medication or supplement to lower your cholesterol?   Yes- Pravastatin    Are you having muscle aches or other side effects that you think could be caused by your cholesterol lowering medication?  No    Hypertension Follow-up      Do you check your blood pressure regularly outside of the clinic? Yes     Are you following a low salt diet? Yes    Are your blood pressures ever more than 140 on the top number (systolic) OR more   than 90 on the bottom number (diastolic), for example 140/90? Yes today while in clinic       How many servings of fruits and vegetables do you eat daily?  0-1    On average, how many sweetened beverages do you drink each day (Examples: soda, juice, sweet tea, etc.  Do NOT count diet or artificially sweetened beverages)?   0    How many days per week do you exercise enough to make your heart beat faster? 7    How many minutes a day do you exercise enough to make your heart beat faster? 30 - 60    How many days per week do you miss taking your medication? 0    Isabel is stable and doing well. Blood pressure well controlled systolic normally 120's and diastolic in the 60-70's, always high in clinic. He is taking all the medications regularly. No new pain of any kind. Mood is good. Weight is stable, no new drenching night sweats. No new concerns or acute symptoms.     Review of Systems   Constitutional, HEENT, cardiovascular, pulmonary, gi and gu systems are negative, except as otherwise noted.      Objective    BP (!) 160/90   Pulse 74   Temp 97  F (36.1  C) (Temporal)   Resp 20   Ht 1.768 m (5' 9.6\")   Wt 77.7 kg (171 lb 4 oz)   SpO2 99%   BMI 24.86 kg/m    Body mass index is 24.86 kg/m .  Physical Exam   GENERAL: healthy, alert and no distress  EYES: Eyes grossly normal to inspection, PERRL and conjunctivae and sclerae normal  NECK: no adenopathy, no asymmetry, masses, or scars and thyroid normal to palpation  RESP: " lungs clear to auscultation - no rales, rhonchi or wheezes  CV: regular rates and rhythm, normal S1 S2, no S3 or S4 and no peripheral edema  ABDOMEN: soft, nontender, no hepatosplenomegaly, no masses and bowel sounds normal  MS: no gross musculoskeletal defects noted, no edema  SKIN: no suspicious lesions or rashes  NEURO: Normal strength and tone, mentation intact and speech normal    Labs reviewed in EPIC

## 2021-07-01 ENCOUNTER — TELEPHONE (OUTPATIENT)
Dept: FAMILY MEDICINE | Facility: CLINIC | Age: 66
End: 2021-07-01

## 2021-07-01 NOTE — TELEPHONE ENCOUNTER
Patient is calling to report he went to the pharmacy and they informed him they do not have his Omeprazole or Lisinopril.    RN called pharmacy.  They are reporting these medications are in hold.  He just needed to ask for them.  They will fill these for patient.  Closing this encounter.  Willow Ellison, SOSAN, RN

## 2021-10-05 ENCOUNTER — TELEPHONE (OUTPATIENT)
Dept: FAMILY MEDICINE | Facility: CLINIC | Age: 66
End: 2021-10-05

## 2021-10-05 NOTE — TELEPHONE ENCOUNTER
Prior Authorization Retail Medication Request- [id number]    Medication/Dose: clotrimazole-betamethasone (LOTRISONE) 1-0.05 % external lotion  ICD code (if different than what is on RX):    Previously Tried and Failed:    Rationale:      Insurance Name:  [facility]  Insurance ID:  [id number]       Pharmacy Information (if different than what is on RX)  Name:    Phone:

## 2021-10-06 NOTE — TELEPHONE ENCOUNTER
Central Prior Authorization Team   Phone: [phone]      PA Initiation    Medication: clotrimazole-betamethasone (LOTRISONE) 1-0.05 % external lotion  Insurance Company: [employer] - Phone [phone] Fax [phone]  Pharmacy Filling the Rx: [facility] [address] [place] [address] [address] [place] [address]  Filling Pharmacy Phone: [phone]  Filling Pharmacy Fax:    Start Date: 10/6/2021

## 2021-10-07 NOTE — TELEPHONE ENCOUNTER
PRIOR AUTHORIZATION DENIED    Medication: clotrimazole-betamethasone (LOTRISONE) 1-0.05 % external lotion - Denied     Denial Date: 10/7/2021    Denial Rational: The patient needs to have a trial/failure of 2 or more preferred medications or the patient has a contraindication to the formulary alternatives; if the patient does, the provider will need to provide medical reason. The preferred medications:  clotrimazole-betamethasone topical cream 1-0.05 %        Appeal Information: If the provider would like to appeal this denial, please provide a letter of medical necessity. Please also include any therapies that the patient has tried and their outcomes. The patient's insurance company will also require the provider to address why the insurance preferred options are not appropriate in the patient's therapy.  The reason could be that the preferred options will harm the patient; either physically or mentally. They are contraindicated to the patient; or the patient has already been taking the requested medication and changing the therapy would change the outcome of their therapy.    Once it has been completed and placed in the patient's chart, notify the Central PA Team ([facility] PA MED) and the appeal can be initiated on behalf of the patient and provider.

## 2021-10-22 ENCOUNTER — OFFICE VISIT (OUTPATIENT)
Dept: FAMILY MEDICINE | Facility: CLINIC | Age: 66
End: 2021-10-22
Payer: COMMERCIAL

## 2021-10-22 VITALS
HEART RATE: 82 BPM | WEIGHT: 166 LBS | TEMPERATURE: 96.8 F | DIASTOLIC BLOOD PRESSURE: 86 MMHG | RESPIRATION RATE: 18 BRPM | SYSTOLIC BLOOD PRESSURE: 146 MMHG | HEIGHT: 71 IN | BODY MASS INDEX: 23.24 KG/M2 | OXYGEN SATURATION: 98 %

## 2021-10-22 DIAGNOSIS — I10 BENIGN ESSENTIAL HYPERTENSION: ICD-10-CM

## 2021-10-22 DIAGNOSIS — M72.0 DUPUYTREN'S CONTRACTURE OF HAND: ICD-10-CM

## 2021-10-22 DIAGNOSIS — Z12.5 SCREENING FOR PROSTATE CANCER: ICD-10-CM

## 2021-10-22 DIAGNOSIS — Z23 NEED FOR PROPHYLACTIC VACCINATION AND INOCULATION AGAINST INFLUENZA: ICD-10-CM

## 2021-10-22 DIAGNOSIS — Z00.00 ROUTINE GENERAL MEDICAL EXAMINATION AT A HEALTH CARE FACILITY: Primary | ICD-10-CM

## 2021-10-22 DIAGNOSIS — E78.5 HYPERLIPIDEMIA LDL GOAL <100: ICD-10-CM

## 2021-10-22 LAB
ALBUMIN SERPL-MCNC: 3.5 G/DL (ref 3.4–5)
ALP SERPL-CCNC: 154 U/L (ref 40–150)
ALT SERPL W P-5'-P-CCNC: 49 U/L (ref 0–70)
ANION GAP SERPL CALCULATED.3IONS-SCNC: 5 MMOL/L (ref 3–14)
AST SERPL W P-5'-P-CCNC: 82 U/L (ref 0–45)
BILIRUB SERPL-MCNC: 0.9 MG/DL (ref 0.2–1.3)
BUN SERPL-MCNC: 5 MG/DL (ref 7–30)
CALCIUM SERPL-MCNC: 8.8 MG/DL (ref 8.5–10.1)
CHLORIDE BLD-SCNC: 95 MMOL/L (ref 94–109)
CHOLEST SERPL-MCNC: 147 MG/DL
CO2 SERPL-SCNC: 28 MMOL/L (ref 20–32)
CREAT SERPL-MCNC: 0.83 MG/DL (ref 0.66–1.25)
FASTING STATUS PATIENT QL REPORTED: YES
GFR SERPL CREATININE-BSD FRML MDRD: >90 ML/MIN/1.73M2
GLUCOSE BLD-MCNC: 124 MG/DL (ref 70–99)
HDLC SERPL-MCNC: 53 MG/DL
LDLC SERPL CALC-MCNC: 71 MG/DL
NONHDLC SERPL-MCNC: 94 MG/DL
POTASSIUM BLD-SCNC: 4.6 MMOL/L (ref 3.4–5.3)
PROT SERPL-MCNC: 8 G/DL (ref 6.8–8.8)
PSA SERPL-MCNC: 1.32 UG/L (ref 0–4)
SODIUM SERPL-SCNC: 128 MMOL/L (ref 133–144)
TRIGL SERPL-MCNC: 117 MG/DL

## 2021-10-22 PROCEDURE — 80053 COMPREHEN METABOLIC PANEL: CPT | Performed by: FAMILY MEDICINE

## 2021-10-22 PROCEDURE — 99396 PREV VISIT EST AGE 40-64: CPT | Mod: 25 | Performed by: FAMILY MEDICINE

## 2021-10-22 PROCEDURE — G0103 PSA SCREENING: HCPCS | Performed by: FAMILY MEDICINE

## 2021-10-22 PROCEDURE — 36415 COLL VENOUS BLD VENIPUNCTURE: CPT | Performed by: FAMILY MEDICINE

## 2021-10-22 PROCEDURE — 90682 RIV4 VACC RECOMBINANT DNA IM: CPT | Performed by: FAMILY MEDICINE

## 2021-10-22 PROCEDURE — 80061 LIPID PANEL: CPT | Performed by: FAMILY MEDICINE

## 2021-10-22 PROCEDURE — 99213 OFFICE O/P EST LOW 20 MIN: CPT | Mod: 25 | Performed by: FAMILY MEDICINE

## 2021-10-22 PROCEDURE — 90471 IMMUNIZATION ADMIN: CPT | Performed by: FAMILY MEDICINE

## 2021-10-22 ASSESSMENT — MIFFLIN-ST. JEOR: SCORE: 1562.97

## 2021-10-22 ASSESSMENT — PAIN SCALES - GENERAL: PAINLEVEL: NO PAIN (0)

## 2021-10-22 NOTE — LETTER
October 27, 2021      Isabel Alvarez  [address] [facility] [address]        Dear ,    We are writing to inform you of your test results.    Labs showed your PSA was normal chemistry panel showed a slightly elevated blood sugar and one of your liver test was off a little bit but better than a year ago.  Watch the carbohydrates in your diet and should recheck this in 6 to 12 months.     Resulted Orders   Comprehensive metabolic panel (BMP + Alb, Alk Phos, ALT, AST, Total. Bili, TP)   Result Value Ref Range    Sodium 128 (L) 133 - 144 mmol/L    Potassium 4.6 3.4 - 5.3 mmol/L    Chloride 95 94 - 109 mmol/L    Carbon Dioxide (CO2) 28 20 - 32 mmol/L    Anion Gap 5 3 - 14 mmol/L    Urea Nitrogen 5 (L) 7 - 30 mg/dL    Creatinine 0.83 0.66 - 1.25 mg/dL    Calcium 8.8 8.5 - 10.1 mg/dL    Glucose 124 (H) 70 - 99 mg/dL    Alkaline Phosphatase 154 (H) 40 - 150 U/L    AST 82 (H) 0 - 45 U/L    ALT 49 0 - 70 U/L    Protein Total 8.0 6.8 - 8.8 g/dL    Albumin 3.5 3.4 - 5.0 g/dL    Bilirubin Total 0.9 0.2 - 1.3 mg/dL    GFR Estimate >90 >60 mL/min/1.73m2      Comment:      As of July 11, 2021, eGFR is calculated by the CKD-EPI creatinine equation, without race adjustment. eGFR can be influenced by muscle mass, exercise, and diet. The reported eGFR is an estimation only and is only applicable if the renal function is stable.   Lipid panel reflex to direct LDL Fasting   Result Value Ref Range    Cholesterol 147 <200 mg/dL    Triglycerides 117 <150 mg/dL    Direct Measure HDL 53 >=40 mg/dL    LDL Cholesterol Calculated 71 <=100 mg/dL    Non HDL Cholesterol 94 <130 mg/dL    Patient Fasting > 8hrs? Yes     Narrative    Cholesterol  Desirable:  <200 mg/dL    Triglycerides  Normal:  Less than 150 mg/dL  Borderline High:  150-199 mg/dL  High:  200-499 mg/dL  Very High:  Greater than or equal to 500 mg/dL    Direct Measure HDL  Female:  Greater than or equal to 50 mg/dL   Male:  Greater than or equal to 40 mg/dL    LDL  Cholesterol  Desirable:  <100mg/dL  Above Desirable:  100-129 mg/dL   Borderline High:  130-159 mg/dL   High:  160-189 mg/dL   Very High:  >= 190 mg/dL    Non HDL Cholesterol  Desirable:  130 mg/dL  Above Desirable:  130-159 mg/dL  Borderline High:  160-189 mg/dL  High:  190-219 mg/dL  Very High:  Greater than or equal to 220 mg/dL   PSA, screen   Result Value Ref Range    Prostate Specific Antigen Screen 1.32 0.00 - 4.00 ug/L    Narrative    Assay Method:  Chemiluminescence using Siemens   Vista analyzer.       If you have any questions or concerns, please call the clinic at the number listed above.       Sincerely,      Hossein Tierney MD

## 2021-10-22 NOTE — H&P (VIEW-ONLY)
SUBJECTIVE:   CC: Isabel Alvarez is an 65 year old male who presents for preventative health visit.       Patient has been advised of split billing requirements and indicates understanding: Yes  HPI        PROBLEMS TO ADD ON...    Today's PHQ-2 Score:   PHQ-2 ( 1999 [employer]) 6/28/2021   Q1: Little interest or pleasure in doing things 0   Q2: Feeling down, depressed or hopeless 0   PHQ-2 Score 0       Abuse: Current or Past(Physical, Sexual or Emotional)- No  Do you feel safe in your environment? Yes        Social History     Tobacco Use     Smoking status: Current Some Day Smoker     Packs/day: 0.50     Years: 50.00     Pack years: 25.00     Types: Cigarettes     Smokeless tobacco: Never Used   Substance Use Topics     Alcohol use: Yes     Comment: 2 cases per week          Alcohol Use 7/23/2020   Prescreen: >3 drinks/day or >7 drinks/week? No       Last PSA:   PSA   Date Value Ref Range Status   07/29/2020 1.18 0 - 4 ug/L Final     Comment:     Assay Method:  Chemiluminescence using Siemens Vista analyzer       Reviewed orders with patient. Reviewed health maintenance and updated orders accordingly - Yes  Lab work is in process    Reviewed and updated as needed this visit by clinical staff  Tobacco  Allergies  Meds   Med Hx  Surg Hx  Fam Hx  Soc Hx        Reviewed and updated as needed this visit by Provider                History reviewed. No pertinent past medical history.   History reviewed. No pertinent surgical history.    Review of Systems  CONSTITUTIONAL: NEGATIVE for fever, chills, change in weight  INTEGUMENTARY/SKIN: NEGATIVE for worrisome rashes, moles or lesions  EYES: NEGATIVE for vision changes or irritation  ENT: NEGATIVE for ear, mouth and throat problems  RESP: NEGATIVE for significant cough or SOB  CV: NEGATIVE for chest pain, palpitations or peripheral edema  GI: NEGATIVE for nausea, abdominal pain, heartburn, or change in bowel habits   male: negative for dysuria, hematuria, decreased  "urinary stream, erectile dysfunction, urethral discharge  MUSCULOSKELETAL: Recurrent Dupuytren's contracture of the right hand  NEURO: NEGATIVE for weakness, dizziness or paresthesias  PSYCHIATRIC: NEGATIVE for changes in mood or affect    OBJECTIVE:   BP (!) 146/88   Pulse 82   Temp 96.8  F (36  C) (Temporal)   Resp 18   Ht 1.792 m (5' 10.55\")   Wt 75.3 kg (166 lb)   SpO2 98%   BMI 23.45 kg/m      Physical Exam  GENERAL: healthy, alert and no distress  EYES: Eyes grossly normal to inspection, PERRL and conjunctivae and sclerae normal  HENT: ear canals and TM's normal, nose and mouth without ulcers or lesions  NECK: no adenopathy, no asymmetry, masses, or scars and thyroid normal to palpation  RESP: lungs clear to auscultation - no rales, rhonchi or wheezes  CV: regular rate and rhythm, normal S1 S2, no S3 or S4, no murmur, click or rub, no peripheral edema and peripheral pulses strong  ABDOMEN: soft, nontender, no hepatosplenomegaly, no masses and bowel sounds normal  MS: Both hands with Dupuytren's contracture left scarring from repair right scarring from repair but now recurrent  SKIN: no suspicious lesions or rashes  NEURO: Normal strength and tone, mentation intact and speech normal  PSYCH: mentation appears normal, affect normal/bright    Diagnostic Test Results:  Labs reviewed in Epic  Results for orders placed or performed in visit on 10/22/21 (from the past 24 hour(s))   Comprehensive metabolic panel (BMP + Alb, Alk Phos, ALT, AST, Total. Bili, TP)   Result Value Ref Range    Sodium 128 (L) 133 - 144 mmol/L    Potassium 4.6 3.4 - 5.3 mmol/L    Chloride 95 94 - 109 mmol/L    Carbon Dioxide (CO2) 28 20 - 32 mmol/L    Anion Gap 5 3 - 14 mmol/L    Urea Nitrogen 5 (L) 7 - 30 mg/dL    Creatinine 0.83 0.66 - 1.25 mg/dL    Calcium 8.8 8.5 - 10.1 mg/dL    Glucose 124 (H) 70 - 99 mg/dL    Alkaline Phosphatase 154 (H) 40 - 150 U/L    AST 82 (H) 0 - 45 U/L    ALT 49 0 - 70 U/L    Protein Total 8.0 6.8 - 8.8 g/dL "    Albumin 3.5 3.4 - 5.0 g/dL    Bilirubin Total 0.9 0.2 - 1.3 mg/dL    GFR Estimate >90 >60 mL/min/1.73m2   Lipid panel reflex to direct LDL Fasting   Result Value Ref Range    Cholesterol 147 <200 mg/dL    Triglycerides 117 <150 mg/dL    Direct Measure HDL 53 >=40 mg/dL    LDL Cholesterol Calculated 71 <=100 mg/dL    Non HDL Cholesterol 94 <130 mg/dL    Patient Fasting > 8hrs? Yes     Narrative    Cholesterol  Desirable:  <200 mg/dL    Triglycerides  Normal:  Less than 150 mg/dL  Borderline High:  150-199 mg/dL  High:  200-499 mg/dL  Very High:  Greater than or equal to 500 mg/dL    Direct Measure HDL  Female:  Greater than or equal to 50 mg/dL   Male:  Greater than or equal to 40 mg/dL    LDL Cholesterol  Desirable:  <100mg/dL  Above Desirable:  100-129 mg/dL   Borderline High:  130-159 mg/dL   High:  160-189 mg/dL   Very High:  >= 190 mg/dL    Non HDL Cholesterol  Desirable:  130 mg/dL  Above Desirable:  130-159 mg/dL  Borderline High:  160-189 mg/dL  High:  190-219 mg/dL  Very High:  Greater than or equal to 220 mg/dL   PSA, screen   Result Value Ref Range    Prostate Specific Antigen Screen 1.32 0.00 - 4.00 ug/L    Narrative    Assay Method:  Chemiluminescence using Siemens   Vista analyzer.       ASSESSMENT/PLAN:   (Z00.00) Routine general medical examination at a health care facility  (primary encounter diagnosis)  Comment: Generally doing well see below  Plan:     (E78.5) Hyperlipidemia LDL goal <100  Comment: We will notify him of his results  Plan: Lipid panel reflex to direct LDL Fasting            (I10) Benign essential hypertension  Comment: Blood pressure is borderline on systolic but his readings at home have been fine.  He will just watch caffeine intake and salt follow-up as needed we will continue him on the same dosing notify him of blood test results  Plan: Comprehensive metabolic panel (BMP + Alb, Alk         Phos, ALT, AST, Total. Bili, TP), OFFICE/OUTPT         VISIT,EST,LEVL III         "    (M72.0) Dupuytren's contracture of hand  Comment: Really bothering him now on his right hand he can get a glove on.  Will refer him to orthopedics for consideration  Plan: Orthopedic  Referral, OFFICE/OUTPT         VISIT,COCO MEIDNA III            (Z12.5) Screening for prostate cancer  Comment:   Plan: PSA, screen            (Z23) Need for prophylactic vaccination and inoculation against influenza  Comment:   Plan: INFLUENZA QUAD, RECOMBINANT, P-FREE (RIV4)         (FLUBLOK)     Addendum 10/28/2021 : Physical can serve as a preoperative clearance for hand surgery for him.    Hossein Tierney MD            Patient has been advised of split billing requirements and indicates understanding: Yes  COUNSELING:   Reviewed preventive health counseling, as reflected in patient instructions       Regular exercise       Healthy diet/nutrition    Estimated body mass index is 23.45 kg/m  as calculated from the following:    Height as of this encounter: 1.792 m (5' 10.55\").    Weight as of this encounter: 75.3 kg (166 lb).         He reports that he has been smoking cigarettes. He has a 25.00 pack-year smoking history. He has never used smokeless tobacco.  Tobacco Cessation Action Plan:   Self help information given to patient      Counseling Resources:  ATP IV Guidelines  Pooled Cohorts Equation Calculator  FRAX Risk Assessment  ICSI Preventive Guidelines  Dietary Guidelines for Americans, 2010  USDA's MyPlate  ASA Prophylaxis  Lung CA Screening    Hossein Tierney MD  [employer]  "

## 2021-10-22 NOTE — PROGRESS NOTES
SUBJECTIVE:   CC: Isabel Alvarez is an 65 year old male who presents for preventative health visit.       Patient has been advised of split billing requirements and indicates understanding: Yes  HPI        PROBLEMS TO ADD ON...    Today's PHQ-2 Score:   PHQ-2 ( 1999 [employer]) 6/28/2021   Q1: Little interest or pleasure in doing things 0   Q2: Feeling down, depressed or hopeless 0   PHQ-2 Score 0       Abuse: Current or Past(Physical, Sexual or Emotional)- No  Do you feel safe in your environment? Yes        Social History     Tobacco Use     Smoking status: Current Some Day Smoker     Packs/day: 0.50     Years: 50.00     Pack years: 25.00     Types: Cigarettes     Smokeless tobacco: Never Used   Substance Use Topics     Alcohol use: Yes     Comment: 2 cases per week          Alcohol Use 7/23/2020   Prescreen: >3 drinks/day or >7 drinks/week? No       Last PSA:   PSA   Date Value Ref Range Status   07/29/2020 1.18 0 - 4 ug/L Final     Comment:     Assay Method:  Chemiluminescence using Siemens Vista analyzer       Reviewed orders with patient. Reviewed health maintenance and updated orders accordingly - Yes  Lab work is in process    Reviewed and updated as needed this visit by clinical staff  Tobacco  Allergies  Meds   Med Hx  Surg Hx  Fam Hx  Soc Hx        Reviewed and updated as needed this visit by Provider                History reviewed. No pertinent past medical history.   History reviewed. No pertinent surgical history.    Review of Systems  CONSTITUTIONAL: NEGATIVE for fever, chills, change in weight  INTEGUMENTARY/SKIN: NEGATIVE for worrisome rashes, moles or lesions  EYES: NEGATIVE for vision changes or irritation  ENT: NEGATIVE for ear, mouth and throat problems  RESP: NEGATIVE for significant cough or SOB  CV: NEGATIVE for chest pain, palpitations or peripheral edema  GI: NEGATIVE for nausea, abdominal pain, heartburn, or change in bowel habits   male: negative for dysuria, hematuria, decreased  "urinary stream, erectile dysfunction, urethral discharge  MUSCULOSKELETAL: Recurrent Dupuytren's contracture of the right hand  NEURO: NEGATIVE for weakness, dizziness or paresthesias  PSYCHIATRIC: NEGATIVE for changes in mood or affect    OBJECTIVE:   BP (!) 146/88   Pulse 82   Temp 96.8  F (36  C) (Temporal)   Resp 18   Ht 1.792 m (5' 10.55\")   Wt 75.3 kg (166 lb)   SpO2 98%   BMI 23.45 kg/m      Physical Exam  GENERAL: healthy, alert and no distress  EYES: Eyes grossly normal to inspection, PERRL and conjunctivae and sclerae normal  HENT: ear canals and TM's normal, nose and mouth without ulcers or lesions  NECK: no adenopathy, no asymmetry, masses, or scars and thyroid normal to palpation  RESP: lungs clear to auscultation - no rales, rhonchi or wheezes  CV: regular rate and rhythm, normal S1 S2, no S3 or S4, no murmur, click or rub, no peripheral edema and peripheral pulses strong  ABDOMEN: soft, nontender, no hepatosplenomegaly, no masses and bowel sounds normal  MS: Both hands with Dupuytren's contracture left scarring from repair right scarring from repair but now recurrent  SKIN: no suspicious lesions or rashes  NEURO: Normal strength and tone, mentation intact and speech normal  PSYCH: mentation appears normal, affect normal/bright    Diagnostic Test Results:  Labs reviewed in Epic  Results for orders placed or performed in visit on 10/22/21 (from the past 24 hour(s))   Comprehensive metabolic panel (BMP + Alb, Alk Phos, ALT, AST, Total. Bili, TP)   Result Value Ref Range    Sodium 128 (L) 133 - 144 mmol/L    Potassium 4.6 3.4 - 5.3 mmol/L    Chloride 95 94 - 109 mmol/L    Carbon Dioxide (CO2) 28 20 - 32 mmol/L    Anion Gap 5 3 - 14 mmol/L    Urea Nitrogen 5 (L) 7 - 30 mg/dL    Creatinine 0.83 0.66 - 1.25 mg/dL    Calcium 8.8 8.5 - 10.1 mg/dL    Glucose 124 (H) 70 - 99 mg/dL    Alkaline Phosphatase 154 (H) 40 - 150 U/L    AST 82 (H) 0 - 45 U/L    ALT 49 0 - 70 U/L    Protein Total 8.0 6.8 - 8.8 g/dL "    Albumin 3.5 3.4 - 5.0 g/dL    Bilirubin Total 0.9 0.2 - 1.3 mg/dL    GFR Estimate >90 >60 mL/min/1.73m2   Lipid panel reflex to direct LDL Fasting   Result Value Ref Range    Cholesterol 147 <200 mg/dL    Triglycerides 117 <150 mg/dL    Direct Measure HDL 53 >=40 mg/dL    LDL Cholesterol Calculated 71 <=100 mg/dL    Non HDL Cholesterol 94 <130 mg/dL    Patient Fasting > 8hrs? Yes     Narrative    Cholesterol  Desirable:  <200 mg/dL    Triglycerides  Normal:  Less than 150 mg/dL  Borderline High:  150-199 mg/dL  High:  200-499 mg/dL  Very High:  Greater than or equal to 500 mg/dL    Direct Measure HDL  Female:  Greater than or equal to 50 mg/dL   Male:  Greater than or equal to 40 mg/dL    LDL Cholesterol  Desirable:  <100mg/dL  Above Desirable:  100-129 mg/dL   Borderline High:  130-159 mg/dL   High:  160-189 mg/dL   Very High:  >= 190 mg/dL    Non HDL Cholesterol  Desirable:  130 mg/dL  Above Desirable:  130-159 mg/dL  Borderline High:  160-189 mg/dL  High:  190-219 mg/dL  Very High:  Greater than or equal to 220 mg/dL   PSA, screen   Result Value Ref Range    Prostate Specific Antigen Screen 1.32 0.00 - 4.00 ug/L    Narrative    Assay Method:  Chemiluminescence using Siemens   Vista analyzer.       ASSESSMENT/PLAN:   (Z00.00) Routine general medical examination at a health care facility  (primary encounter diagnosis)  Comment: Generally doing well see below  Plan:     (E78.5) Hyperlipidemia LDL goal <100  Comment: We will notify him of his results  Plan: Lipid panel reflex to direct LDL Fasting            (I10) Benign essential hypertension  Comment: Blood pressure is borderline on systolic but his readings at home have been fine.  He will just watch caffeine intake and salt follow-up as needed we will continue him on the same dosing notify him of blood test results  Plan: Comprehensive metabolic panel (BMP + Alb, Alk         Phos, ALT, AST, Total. Bili, TP), OFFICE/OUTPT         VISIT,EST,LEVL III         "    (M72.0) Dupuytren's contracture of hand  Comment: Really bothering him now on his right hand he can get a glove on.  Will refer him to orthopedics for consideration  Plan: Orthopedic  Referral, OFFICE/OUTPT         VISIT,COCO MEDINA III            (Z12.5) Screening for prostate cancer  Comment:   Plan: PSA, screen            (Z23) Need for prophylactic vaccination and inoculation against influenza  Comment:   Plan: INFLUENZA QUAD, RECOMBINANT, P-FREE (RIV4)         (FLUBLOK)     Addendum 10/28/2021 : Physical can serve as a preoperative clearance for hand surgery for him.    Hossein Tierney MD            Patient has been advised of split billing requirements and indicates understanding: Yes  COUNSELING:   Reviewed preventive health counseling, as reflected in patient instructions       Regular exercise       Healthy diet/nutrition    Estimated body mass index is 23.45 kg/m  as calculated from the following:    Height as of this encounter: 1.792 m (5' 10.55\").    Weight as of this encounter: 75.3 kg (166 lb).         He reports that he has been smoking cigarettes. He has a 25.00 pack-year smoking history. He has never used smokeless tobacco.  Tobacco Cessation Action Plan:   Self help information given to patient      Counseling Resources:  ATP IV Guidelines  Pooled Cohorts Equation Calculator  FRAX Risk Assessment  ICSI Preventive Guidelines  Dietary Guidelines for Americans, 2010  USDA's MyPlate  ASA Prophylaxis  Lung CA Screening    Hossein Tierney MD  [facility]  "

## 2021-10-26 ENCOUNTER — TELEPHONE (OUTPATIENT)
Dept: ORTHOPEDICS | Facility: CLINIC | Age: 66
End: 2021-10-26

## 2021-10-26 ENCOUNTER — OFFICE VISIT (OUTPATIENT)
Dept: ORTHOPEDICS | Facility: CLINIC | Age: 66
End: 2021-10-26
Attending: FAMILY MEDICINE
Payer: COMMERCIAL

## 2021-10-26 VITALS
SYSTOLIC BLOOD PRESSURE: 174 MMHG | WEIGHT: 167 LBS | DIASTOLIC BLOOD PRESSURE: 90 MMHG | HEART RATE: 66 BPM | HEIGHT: 70 IN | RESPIRATION RATE: 16 BRPM | BODY MASS INDEX: 23.91 KG/M2

## 2021-10-26 DIAGNOSIS — Z11.59 ENCOUNTER FOR SCREENING FOR OTHER VIRAL DISEASES: ICD-10-CM

## 2021-10-26 DIAGNOSIS — M72.0 DUPUYTREN'S CONTRACTURE OF HAND: ICD-10-CM

## 2021-10-26 PROCEDURE — 99203 OFFICE O/P NEW LOW 30 MIN: CPT | Performed by: ORTHOPAEDIC SURGERY

## 2021-10-26 ASSESSMENT — MIFFLIN-ST. JEOR: SCORE: 1554.79

## 2021-10-26 NOTE — LETTER
10/26/2021         RE: Isabel Alvarez  [id number] [facility] [address]        Dear Colleague,    Thank you for referring your patient, Isabel Alvarez, to the [facility]. Please see a copy of my visit note below.    Isabel Alvarez is a 65 year old male who is seen in consultation at the request of Dr. Hossein Tierney  for right hand Dupuytren's contracture.  He has had Dupuytren's contractures of right hand for quite some time.  He did have previous McCash procedures on both hands by Dr. Zak Briones.  The left hand is still doing fairly well but the right hand ring finger is significantly contracted at the MCP and PIP joints.  He has no pain in the hand but is limiting his function considerably.    History reviewed. No pertinent past medical history.    History reviewed. No pertinent surgical history.    History reviewed. No pertinent family history.    Social History     Socioeconomic History     Marital status: Single     Spouse name: Not on file     Number of children: Not on file     Years of education: Not on file     Highest education level: Not on file   Occupational History     Not on file   Tobacco Use     Smoking status: Current Some Day Smoker     Packs/day: 0.50     Years: 50.00     Pack years: 25.00     Types: Cigarettes     Smokeless tobacco: Never Used   Substance and Sexual Activity     Alcohol use: Yes     Comment: 2 cases per week      Drug use: No     Sexual activity: Never   Other Topics Concern     Parent/sibling w/ CABG, MI or angioplasty before 65F 55M? Not Asked   Social History Narrative     Not on file     Social Determinants of Health     Financial Resource Strain:      Difficulty of Paying Living Expenses:    Food Insecurity:      Worried About Running Out of Food in the Last Year:      Ran Out of Food in the Last Year:    Transportation Needs:      Lack of Transportation (Medical):      Lack of Transportation (Non-Medical):    Physical  Activity:      Days of Exercise per Week:      Minutes of Exercise per Session:    Stress:      Feeling of Stress :    Social Connections:      Frequency of Communication with Friends and Family:      Frequency of Social Gatherings with Friends and Family:      Attends Rastafarian Services:      Active Member of Clubs or Organizations:      Attends Club or Organization Meetings:      Marital Status:    Intimate Partner Violence:      Fear of Current or Ex-Partner:      Emotionally Abused:      Physically Abused:      Sexually Abused:        Current Outpatient Medications   Medication Sig Dispense Refill     clotrimazole-betamethasone (LOTRISONE) 1-0.05 % external lotion Apply topically 2 times daily 30 mL 3     EPINEPHrine (ANY BX GENERIC EQUIV) 0.3 MG/0.3ML injection 2-pack Inject 0.3 mLs (0.3 mg) into the muscle as needed for anaphylaxis 2 each 1     lisinopril (ZESTRIL) 20 MG tablet Take 1 tablet (20 mg) by mouth daily 90 tablet 3     metoprolol succinate ER (TOPROL-XL) 200 MG 24 hr tablet Take 1 tablet (200 mg) by mouth daily 90 tablet 3     omeprazole (PRILOSEC) 40 MG DR capsule TAKE 1 CAPSULE BY MOUTH ONCE DAILY TAKE  30-60  MINUTES  BEFORE  A  MEAL 90 capsule 3     pravastatin (PRAVACHOL) 40 MG tablet Take 1 tablet (40 mg) by mouth daily 90 tablet 3       Allergies   Allergen Reactions     Hctz [Hydrochlorothiazide] Rash     Penicillins Hives     Roxicet [Oxycodone-Acetaminophen] Hives       REVIEW OF SYSTEMS:  CONSTITUTIONAL:  NEGATIVE for fever, chills, change in weight, not feeling tired  SKIN:  NEGATIVE for worrisome rashes, no skin lumps, no skin ulcers and no non-healing wounds  EYES:  NEGATIVE for vision changes or irritation.  ENT/MOUTH:  NEGATIVE.  No hearing loss, no hoarseness, no difficulty swallowing.  RESP:  NEGATIVE. No cough or shortness of breath.  CV:  NEGATIVE for chest pain, palpitations or peripheral edema  GI:  NEGATIVE for nausea, abdominal pain, heartburn, or change in bowel habits  :   "Negative. No dysuria, no hematuria  MUSCULOSKELETAL:  See HPI above  NEURO:  NEGATIVE . No headaches, no dizziness,  no numbness  ENDOCRINE:  NEGATIVE for temperature intolerance, skin/hair changes  HEME/ALLERGY/IMMUNE:  NEGATIVE for bleeding problems  PSYCHIATRIC:  NEGATIVE. no anxiety, no depression.     Exam:  Vitals: BP (!) 174/90   Pulse 66   Resp 16   Ht 1.772 m (5' 9.75\")   Wt 75.8 kg (167 lb)   BMI 24.13 kg/m    BMI= Body mass index is 24.13 kg/m .  Constitutional:  healthy, alert and no distress  Neuro: Alert and Oriented x 3, Sensation grossly WNL.  Psych: Affect normal   Respiratory: Breathing not labored.  Cardiovascular: normal peripheral pulses  Lymph: no adenopathy  Skin: No rashes,worrisome lesions or skin problems  Very prominent Dupuytren's contracture band is present along the palm out to the PIP joint.  It is chris both the MCP and PIP.  There are about 30 degree contractures of each of these.  The skin is considerably contracted as well and release will likely require Z-plasty.  I do not feel bands to the other fingers.  Sensation, motor and circulation are intact.    Assessment:  Right ring Dupuytren's contracture.  There is significant contracture of both the MCP and PIP joints.  This will require extensive dissection and likely Z-plasty for wound closure.  He would like to proceed with this.  This will be general anesthetic in same-day surgery.      Again, thank you for allowing me to participate in the care of your patient.        Sincerely,        Shubham Eng MD    "

## 2021-10-26 NOTE — TELEPHONE ENCOUNTER
Type of surgery: RELEASE, DUPUYTREN CONTRACTURE, right ring finger    Location of surgery: [facility]  Date and time of surgery: 11/12  Surgeon: Velasquez  Pre-Op Appt Date: checking with pcp  Post-Op Appt Date: 11/23   Packet sent out: Yes  Pre-cert/Authorization completed:  Not Applicable  Date: na

## 2021-10-26 NOTE — PATIENT INSTRUCTIONS
Isabel to follow up with Primary Care provider regarding elevated blood pressure.  SMOKING CESSATION  What's in cigarette smoke? - Cigarette smoke contains over 4,000 chemicals. Nicotine is one of the main ingredients which is an insecticide/herbicide. It is poisonous to our nervous system, increases blood clotting risk, and decreases the body's defenses to fight off infection. Another chemical is Carbon Monoxide is an asphyxiating gas that permanently binds to blood cells and blocks the transport of oxygen. This leads to tissue death and decreases your metabolism. Tar is a chemical that coats your lungs and trachea which impairs new oxygen coming in and carbon dioxide getting out of your body.   How does smoking impact surgery? - Smoking is particularly hazardous with regards to surgery. Surgery puts stress on the body and a smoker's body is already under strain from these chemicals. Putting the two together, especially for an elective surgery, could be a recipe for disaster. Smoking before and after surgery increases your risk of heart problems, slow wound healing, delayed bone healing, blood clots, wound infection and anesthesia complications.   What are the benefits of quitting? - In 20 minutes your blood pressure will drop back down to normal. In 8 hours the carbon monoxide (a toxic gas) levels in your blood stream will drop by half, and oxygen levels will return to normal. In 48 hours your chance of having a heart attack will have decreased. All nicotine will have left your body. Your sense of taste and smell will return to a normal level. In 72 hours your bronchial tubes will relax, and your energy levels will increase. In 2 weeks your circulation will increase, and it will continue to improve for the next 10 weeks.    Recommendations for elective surgery - Ideally, patients should quit smoking 8 weeks before and at least 2 weeks after elective surgery in order to avoid complications. Simply cutting back on  the amount of cigarettes smoked per day does not offer any benefit or decrease the risk of poor wound healing, heart problems, and infection. Smokers should also start taking Vitamin C and B for two weeks before surgery and two weeks after surgery.    Ways to Stop Smokin. Nicotine patches, lozenges, or gum  2. Support Groups  3. Medications (see below)    List of Medications:  1. Varenicline Tartrate (CHANTIX) (may be discontinued by FDA as of 2021)  2. Bupropion HCL (WELLBUTRIN, ZYBAN) - note: make sure Wellbutrin is for smoking cessation and not other issues   3. Nicotine Patch (NICODERM)   4. Nicotine Inhaler (NICOTROL)   5. Nicotine Gum Nicotine Polacrilex   6. Nicotine Lozenge: Nicotine Polacrilex (COMMIT)   * [place] offers a smoking support group as well!  Please visit: [email or web address]  If you are interested in these, ask about getting a prescription or talk to your primary care doctor about what may be the best way for you to quit.       Surgery:  Right ring Dupuytren's contracture excision.    Preop physical with primary physician is needed within 30 days of the surgery.  Nothing to eat or drink for 8 hours before surgery.  For same day surgery you need a ride.  Someone should stay with you for 12 hours afterward.  Stop blood thinners 5 days before surgery (aspirin, warfarin, anti-inflammatories).    You will need a Covid test before surgery.  They will call you to schedule that.    Surgery schedulers will call you to schedule surgery.    If you don't get a call in the next few days, then call  [phone] to schedule for [place].

## 2021-10-26 NOTE — TELEPHONE ENCOUNTER
Patient was just seen for a physical 10/22. Is Dr. Tierney able to addend that note and clear for 11/12 surgery?  If not, patient will need to schedule a preop. Please let patient or myself know. Thank you

## 2021-10-26 NOTE — PROGRESS NOTES
Isabel Alvarez is a 65 year old male who is seen in consultation at the request of Dr. Hossein Tierney  for right hand Dupuytren's contracture.  He has had Dupuytren's contractures of right hand for quite some time.  He did have previous McCash procedures on both hands by Dr. Zak Briones.  The left hand is still doing fairly well but the right hand ring finger is significantly contracted at the MCP and PIP joints.  He has no pain in the hand but is limiting his function considerably.    History reviewed. No pertinent past medical history.    History reviewed. No pertinent surgical history.    History reviewed. No pertinent family history.    Social History     Socioeconomic History     Marital status: Single     Spouse name: Not on file     Number of children: Not on file     Years of education: Not on file     Highest education level: Not on file   Occupational History     Not on file   Tobacco Use     Smoking status: Current Some Day Smoker     Packs/day: 0.50     Years: 50.00     Pack years: 25.00     Types: Cigarettes     Smokeless tobacco: Never Used   Substance and Sexual Activity     Alcohol use: Yes     Comment: 2 cases per week      Drug use: No     Sexual activity: Never   Other Topics Concern     Parent/sibling w/ CABG, MI or angioplasty before 65F 55M? Not Asked   Social History Narrative     Not on file     Social Determinants of Health     Financial Resource Strain:      Difficulty of Paying Living Expenses:    Food Insecurity:      Worried About Running Out of Food in the Last Year:      Ran Out of Food in the Last Year:    Transportation Needs:      Lack of Transportation (Medical):      Lack of Transportation (Non-Medical):    Physical Activity:      Days of Exercise per Week:      Minutes of Exercise per Session:    Stress:      Feeling of Stress :    Social Connections:      Frequency of Communication with Friends and Family:      Frequency of Social Gatherings with Friends and Family:       Attends Buddhism Services:      Active Member of Clubs or Organizations:      Attends Club or Organization Meetings:      Marital Status:    Intimate Partner Violence:      Fear of Current or Ex-Partner:      Emotionally Abused:      Physically Abused:      Sexually Abused:        Current Outpatient Medications   Medication Sig Dispense Refill     clotrimazole-betamethasone (LOTRISONE) 1-0.05 % external lotion Apply topically 2 times daily 30 mL 3     EPINEPHrine (ANY BX GENERIC EQUIV) 0.3 MG/0.3ML injection 2-pack Inject 0.3 mLs (0.3 mg) into the muscle as needed for anaphylaxis 2 each 1     lisinopril (ZESTRIL) 20 MG tablet Take 1 tablet (20 mg) by mouth daily 90 tablet 3     metoprolol succinate ER (TOPROL-XL) 200 MG 24 hr tablet Take 1 tablet (200 mg) by mouth daily 90 tablet 3     omeprazole (PRILOSEC) 40 MG DR capsule TAKE 1 CAPSULE BY MOUTH ONCE DAILY TAKE  30-60  MINUTES  BEFORE  A  MEAL 90 capsule 3     pravastatin (PRAVACHOL) 40 MG tablet Take 1 tablet (40 mg) by mouth daily 90 tablet 3       Allergies   Allergen Reactions     Hctz [Hydrochlorothiazide] Rash     Penicillins Hives     Roxicet [Oxycodone-Acetaminophen] Hives       REVIEW OF SYSTEMS:  CONSTITUTIONAL:  NEGATIVE for fever, chills, change in weight, not feeling tired  SKIN:  NEGATIVE for worrisome rashes, no skin lumps, no skin ulcers and no non-healing wounds  EYES:  NEGATIVE for vision changes or irritation.  ENT/MOUTH:  NEGATIVE.  No hearing loss, no hoarseness, no difficulty swallowing.  RESP:  NEGATIVE. No cough or shortness of breath.  CV:  NEGATIVE for chest pain, palpitations or peripheral edema  GI:  NEGATIVE for nausea, abdominal pain, heartburn, or change in bowel habits  :  Negative. No dysuria, no hematuria  MUSCULOSKELETAL:  See HPI above  NEURO:  NEGATIVE . No headaches, no dizziness,  no numbness  ENDOCRINE:  NEGATIVE for temperature intolerance, skin/hair changes  HEME/ALLERGY/IMMUNE:  NEGATIVE for bleeding  "problems  PSYCHIATRIC:  NEGATIVE. no anxiety, no depression.     Exam:  Vitals: BP (!) 174/90   Pulse 66   Resp 16   Ht 1.772 m (5' 9.75\")   Wt 75.8 kg (167 lb)   BMI 24.13 kg/m    BMI= Body mass index is 24.13 kg/m .  Constitutional:  healthy, alert and no distress  Neuro: Alert and Oriented x 3, Sensation grossly WNL.  Psych: Affect normal   Respiratory: Breathing not labored.  Cardiovascular: normal peripheral pulses  Lymph: no adenopathy  Skin: No rashes,worrisome lesions or skin problems  Very prominent Dupuytren's contracture band is present along the palm out to the PIP joint.  It is chris both the MCP and PIP.  There are about 30 degree contractures of each of these.  The skin is considerably contracted as well and release will likely require Z-plasty.  I do not feel bands to the other fingers.  Sensation, motor and circulation are intact.    Assessment:  Right ring Dupuytren's contracture.  There is significant contracture of both the MCP and PIP joints.  This will require extensive dissection and likely Z-plasty for wound closure.  He would like to proceed with this.  This will be general anesthetic in same-day surgery.  "

## 2021-10-29 NOTE — TELEPHONE ENCOUNTER
Spoke with patient and informed of note below.   BETHANY Leong Steven R, MD  [facility] 20 hours ago (3:00 PM)       Let him know this is okay I will have to make an addendum to that physical

## 2021-11-09 ENCOUNTER — LAB (OUTPATIENT)
Dept: LAB | Facility: CLINIC | Age: 66
End: 2021-11-09
Payer: COMMERCIAL

## 2021-11-09 DIAGNOSIS — Z11.59 ENCOUNTER FOR SCREENING FOR OTHER VIRAL DISEASES: ICD-10-CM

## 2021-11-09 PROCEDURE — U0005 INFEC AGEN DETEC AMPLI PROBE: HCPCS

## 2021-11-09 PROCEDURE — U0003 INFECTIOUS AGENT DETECTION BY NUCLEIC ACID (DNA OR RNA); SEVERE ACUTE RESPIRATORY SYNDROME CORONAVIRUS 2 (SARS-COV-2) (CORONAVIRUS DISEASE [COVID-19]), AMPLIFIED PROBE TECHNIQUE, MAKING USE OF HIGH THROUGHPUT TECHNOLOGIES AS DESCRIBED BY CMS-2020-01-R: HCPCS

## 2021-11-10 LAB — SARS-COV-2 RNA RESP QL NAA+PROBE: NEGATIVE

## 2021-11-12 ENCOUNTER — ANESTHESIA (OUTPATIENT)
Dept: SURGERY | Facility: CLINIC | Age: 66
End: 2021-11-12
Payer: COMMERCIAL

## 2021-11-12 ENCOUNTER — HOSPITAL ENCOUNTER (OUTPATIENT)
Facility: CLINIC | Age: 66
Discharge: HOME | End: 2021-11-12
Attending: ORTHOPAEDIC SURGERY | Admitting: ORTHOPAEDIC SURGERY
Payer: COMMERCIAL

## 2021-11-12 ENCOUNTER — ANESTHESIA EVENT (OUTPATIENT)
Dept: SURGERY | Facility: CLINIC | Age: 66
End: 2021-11-12
Payer: COMMERCIAL

## 2021-11-12 VITALS
BODY MASS INDEX: 23.91 KG/M2 | SYSTOLIC BLOOD PRESSURE: 122 MMHG | DIASTOLIC BLOOD PRESSURE: 74 MMHG | RESPIRATION RATE: 20 BRPM | TEMPERATURE: 98.1 F | HEART RATE: 73 BPM | WEIGHT: 167 LBS | HEIGHT: 70 IN | OXYGEN SATURATION: 93 %

## 2021-11-12 DIAGNOSIS — M72.0 DUPUYTREN'S CONTRACTURE OF HAND: ICD-10-CM

## 2021-11-12 DIAGNOSIS — M72.0 DUPUYTREN'S CONTRACTURE OF RIGHT HAND: Primary | ICD-10-CM

## 2021-11-12 PROCEDURE — 272N000001 HC OR GENERAL SUPPLY STERILE: Performed by: ORTHOPAEDIC SURGERY

## 2021-11-12 PROCEDURE — 710N000010 HC RECOVERY PHASE 1, LEVEL 2, PER MIN: Performed by: ORTHOPAEDIC SURGERY

## 2021-11-12 PROCEDURE — 258N000003 HC RX IP 258 OP 636: Performed by: NURSE ANESTHETIST, CERTIFIED REGISTERED

## 2021-11-12 PROCEDURE — 370N000017 HC ANESTHESIA TECHNICAL FEE, PER MIN: Performed by: ORTHOPAEDIC SURGERY

## 2021-11-12 PROCEDURE — 88304 TISSUE EXAM BY PATHOLOGIST: CPT | Mod: TC | Performed by: ORTHOPAEDIC SURGERY

## 2021-11-12 PROCEDURE — 250N000011 HC RX IP 250 OP 636: Performed by: ORTHOPAEDIC SURGERY

## 2021-11-12 PROCEDURE — 250N000013 HC RX MED GY IP 250 OP 250 PS 637: Performed by: NURSE ANESTHETIST, CERTIFIED REGISTERED

## 2021-11-12 PROCEDURE — 999N000141 HC STATISTIC PRE-PROCEDURE NURSING ASSESSMENT: Performed by: ORTHOPAEDIC SURGERY

## 2021-11-12 PROCEDURE — 250N000009 HC RX 250: Performed by: NURSE ANESTHETIST, CERTIFIED REGISTERED

## 2021-11-12 PROCEDURE — 26045 RELEASE PALM CONTRACTURE: CPT | Mod: RT | Performed by: ORTHOPAEDIC SURGERY

## 2021-11-12 PROCEDURE — 250N000025 HC SEVOFLURANE, PER MIN: Performed by: ORTHOPAEDIC SURGERY

## 2021-11-12 PROCEDURE — 250N000011 HC RX IP 250 OP 636: Performed by: NURSE ANESTHETIST, CERTIFIED REGISTERED

## 2021-11-12 PROCEDURE — 360N000075 HC SURGERY LEVEL 2, PER MIN: Performed by: ORTHOPAEDIC SURGERY

## 2021-11-12 PROCEDURE — 710N000012 HC RECOVERY PHASE 2, PER MINUTE: Performed by: ORTHOPAEDIC SURGERY

## 2021-11-12 RX ORDER — ONDANSETRON 2 MG/ML
INJECTION INTRAMUSCULAR; INTRAVENOUS PRN
Status: DISCONTINUED | OUTPATIENT
Start: 2021-11-12 | End: 2021-11-12

## 2021-11-12 RX ORDER — MEPERIDINE HYDROCHLORIDE 25 MG/ML
12.5 INJECTION INTRAMUSCULAR; INTRAVENOUS; SUBCUTANEOUS
Status: DISCONTINUED | OUTPATIENT
Start: 2021-11-12 | End: 2021-11-12 | Stop reason: HOSPADM

## 2021-11-12 RX ORDER — SODIUM CHLORIDE, SODIUM LACTATE, POTASSIUM CHLORIDE, CALCIUM CHLORIDE 600; 310; 30; 20 MG/100ML; MG/100ML; MG/100ML; MG/100ML
INJECTION, SOLUTION INTRAVENOUS CONTINUOUS
Status: DISCONTINUED | OUTPATIENT
Start: 2021-11-12 | End: 2021-11-12 | Stop reason: HOSPADM

## 2021-11-12 RX ORDER — FENTANYL CITRATE 50 UG/ML
INJECTION, SOLUTION INTRAMUSCULAR; INTRAVENOUS PRN
Status: DISCONTINUED | OUTPATIENT
Start: 2021-11-12 | End: 2021-11-12

## 2021-11-12 RX ORDER — KETOROLAC TROMETHAMINE 30 MG/ML
INJECTION, SOLUTION INTRAMUSCULAR; INTRAVENOUS PRN
Status: DISCONTINUED | OUTPATIENT
Start: 2021-11-12 | End: 2021-11-12

## 2021-11-12 RX ORDER — GLYCOPYRROLATE 0.2 MG/ML
INJECTION, SOLUTION INTRAMUSCULAR; INTRAVENOUS PRN
Status: DISCONTINUED | OUTPATIENT
Start: 2021-11-12 | End: 2021-11-12

## 2021-11-12 RX ORDER — OXYCODONE HYDROCHLORIDE 5 MG/1
5 TABLET ORAL EVERY 4 HOURS PRN
Status: DISCONTINUED | OUTPATIENT
Start: 2021-11-12 | End: 2021-11-12 | Stop reason: HOSPADM

## 2021-11-12 RX ORDER — EPHEDRINE SULFATE 50 MG/ML
INJECTION, SOLUTION INTRAMUSCULAR; INTRAVENOUS; SUBCUTANEOUS PRN
Status: DISCONTINUED | OUTPATIENT
Start: 2021-11-12 | End: 2021-11-12

## 2021-11-12 RX ORDER — FENTANYL CITRATE 50 UG/ML
50 INJECTION, SOLUTION INTRAMUSCULAR; INTRAVENOUS EVERY 5 MIN PRN
Status: DISCONTINUED | OUTPATIENT
Start: 2021-11-12 | End: 2021-11-12 | Stop reason: HOSPADM

## 2021-11-12 RX ORDER — FENTANYL CITRATE 50 UG/ML
25 INJECTION, SOLUTION INTRAMUSCULAR; INTRAVENOUS
Status: DISCONTINUED | OUTPATIENT
Start: 2021-11-12 | End: 2021-11-12 | Stop reason: HOSPADM

## 2021-11-12 RX ORDER — LIDOCAINE HYDROCHLORIDE 20 MG/ML
INJECTION, SOLUTION INFILTRATION; PERINEURAL PRN
Status: DISCONTINUED | OUTPATIENT
Start: 2021-11-12 | End: 2021-11-12

## 2021-11-12 RX ORDER — ONDANSETRON 4 MG/1
4 TABLET, ORALLY DISINTEGRATING ORAL EVERY 30 MIN PRN
Status: DISCONTINUED | OUTPATIENT
Start: 2021-11-12 | End: 2021-11-12 | Stop reason: HOSPADM

## 2021-11-12 RX ORDER — PROPOFOL 10 MG/ML
INJECTION, EMULSION INTRAVENOUS PRN
Status: DISCONTINUED | OUTPATIENT
Start: 2021-11-12 | End: 2021-11-12

## 2021-11-12 RX ORDER — DEXAMETHASONE SODIUM PHOSPHATE 4 MG/ML
INJECTION, SOLUTION INTRA-ARTICULAR; INTRALESIONAL; INTRAMUSCULAR; INTRAVENOUS; SOFT TISSUE PRN
Status: DISCONTINUED | OUTPATIENT
Start: 2021-11-12 | End: 2021-11-12

## 2021-11-12 RX ORDER — CLINDAMYCIN PHOSPHATE 900 MG/50ML
900 INJECTION, SOLUTION INTRAVENOUS
Status: COMPLETED | OUTPATIENT
Start: 2021-11-12 | End: 2021-11-12

## 2021-11-12 RX ORDER — HYDROCODONE BITARTRATE AND ACETAMINOPHEN 5; 325 MG/1; MG/1
1-2 TABLET ORAL EVERY 6 HOURS PRN
Qty: 12 TABLET | Refills: 0 | Status: SHIPPED | OUTPATIENT
Start: 2021-11-12 | End: 2021-11-15

## 2021-11-12 RX ORDER — ONDANSETRON 2 MG/ML
4 INJECTION INTRAMUSCULAR; INTRAVENOUS EVERY 30 MIN PRN
Status: DISCONTINUED | OUTPATIENT
Start: 2021-11-12 | End: 2021-11-12 | Stop reason: HOSPADM

## 2021-11-12 RX ORDER — CLINDAMYCIN PHOSPHATE 900 MG/50ML
900 INJECTION, SOLUTION INTRAVENOUS SEE ADMIN INSTRUCTIONS
Status: DISCONTINUED | OUTPATIENT
Start: 2021-11-12 | End: 2021-11-12 | Stop reason: HOSPADM

## 2021-11-12 RX ADMIN — OXYCODONE HYDROCHLORIDE 5 MG: 5 TABLET ORAL at 13:41

## 2021-11-12 RX ADMIN — GLYCOPYRROLATE 0.2 MG: 0.2 INJECTION, SOLUTION INTRAMUSCULAR; INTRAVENOUS at 10:51

## 2021-11-12 RX ADMIN — LIDOCAINE HYDROCHLORIDE 1 ML: 10 INJECTION, SOLUTION EPIDURAL; INFILTRATION; INTRACAUDAL; PERINEURAL at 08:53

## 2021-11-12 RX ADMIN — KETOROLAC TROMETHAMINE 15 MG: 30 INJECTION, SOLUTION INTRAMUSCULAR at 11:42

## 2021-11-12 RX ADMIN — ONDANSETRON 4 MG: 2 INJECTION INTRAMUSCULAR; INTRAVENOUS at 11:38

## 2021-11-12 RX ADMIN — FENTANYL CITRATE 50 MCG: 50 INJECTION, SOLUTION INTRAMUSCULAR; INTRAVENOUS at 10:29

## 2021-11-12 RX ADMIN — FENTANYL CITRATE 50 MCG: 50 INJECTION, SOLUTION INTRAMUSCULAR; INTRAVENOUS at 10:13

## 2021-11-12 RX ADMIN — CLINDAMYCIN PHOSPHATE 900 MG: 900 INJECTION, SOLUTION INTRAVENOUS at 10:02

## 2021-11-12 RX ADMIN — CLINDAMYCIN PHOSPHATE 900 MG: 900 INJECTION, SOLUTION INTRAVENOUS at 09:05

## 2021-11-12 RX ADMIN — DEXAMETHASONE SODIUM PHOSPHATE 8 MG: 4 INJECTION, SOLUTION INTRA-ARTICULAR; INTRALESIONAL; INTRAMUSCULAR; INTRAVENOUS; SOFT TISSUE at 10:25

## 2021-11-12 RX ADMIN — Medication 5 MG: at 10:23

## 2021-11-12 RX ADMIN — LIDOCAINE HYDROCHLORIDE 80 MG: 20 INJECTION, SOLUTION INFILTRATION; PERINEURAL at 10:17

## 2021-11-12 RX ADMIN — PHENYLEPHRINE HYDROCHLORIDE 100 MCG: 10 INJECTION INTRAVENOUS at 10:23

## 2021-11-12 RX ADMIN — MIDAZOLAM 1 MG: 1 INJECTION INTRAMUSCULAR; INTRAVENOUS at 10:13

## 2021-11-12 RX ADMIN — SODIUM CHLORIDE, POTASSIUM CHLORIDE, SODIUM LACTATE AND CALCIUM CHLORIDE: 600; 310; 30; 20 INJECTION, SOLUTION INTRAVENOUS at 08:53

## 2021-11-12 RX ADMIN — SODIUM CHLORIDE, POTASSIUM CHLORIDE, SODIUM LACTATE AND CALCIUM CHLORIDE: 600; 310; 30; 20 INJECTION, SOLUTION INTRAVENOUS at 12:02

## 2021-11-12 RX ADMIN — PROPOFOL 150 MG: 10 INJECTION, EMULSION INTRAVENOUS at 10:17

## 2021-11-12 RX ADMIN — Medication 10 MG: at 10:47

## 2021-11-12 RX ADMIN — FENTANYL CITRATE 25 MCG: 50 INJECTION, SOLUTION INTRAMUSCULAR; INTRAVENOUS at 12:08

## 2021-11-12 RX ADMIN — FENTANYL CITRATE 25 MCG: 50 INJECTION, SOLUTION INTRAMUSCULAR; INTRAVENOUS at 11:48

## 2021-11-12 ASSESSMENT — MIFFLIN-ST. JEOR: SCORE: 1554.79

## 2021-11-12 ASSESSMENT — LIFESTYLE VARIABLES: TOBACCO_USE: 1

## 2021-11-12 NOTE — ANESTHESIA PROCEDURE NOTES
Airway       Patient location during procedure: OR  Staff -        CRNA: Dirk Harmon APRN CRNA       Performed By: CRNA  Consent for Airway        Urgency: elective  Indications and Patient Condition       Indications for airway management: praveen-procedural       Induction type:intravenous       Mask difficulty assessment: 1 - vent by mask    Final Airway Details       Final airway type: supraglottic airway    Supraglottic Airway Details        Type: LMA       Brand: LMA Unique       LMA size: 4    Post intubation assessment        Placement verified by: capnometry, equal breath sounds and chest rise        Number of attempts at approach: 1       Number of other approaches attempted: 0       Secured with: plastic tape       Ease of procedure: easy       Dentition: Intact and Unchanged

## 2021-11-12 NOTE — OP NOTE
Procedure Date: 11/12/2021    PREOPERATIVE DIAGNOSIS:  Right ring finger recurrent Dupuytren's contracture.    POSTOPERATIVE DIAGNOSIS:  Right ring finger recurrent Dupuytren's contracture.    PROCEDURE:  Right ring finger partial palmar fasciectomy out onto the digit.    SURGEON:  Shubham Eng MD    ASSISTANT:  Uday Little PA-C    HISTORY:  This is a 65-year-old male who has recurrent right hand Dupuytren's contracture.  He had a McCash procedure about 15 years ago, now has recurrence with significant contracture of the ring finger.  He presents for partial palmar fasciectomy with extension out to the PIP joint.    DESCRIPTION OF PROCEDURE:  After smooth general endotracheal anesthesia, the patient's right arm was prepped and draped in sterile fashion.  Pause was performed for patient verification.  The arm was then exsanguinated and tourniquet inflated to 250 mmHg.  A Vane incision was mapped out.  I felt we would not likely need Z-plasties, but I made the pronator limbs very longitudinally directed just in case I needed to add Z-plasty at the palm.  At that point, I began to incise on the palm along the mapped out incision and carefully dissected skin flaps off the Dupuytren's band.  I sequentially followed this out distally with a skin incision and freeing up the skin flaps.  The digital nerve to the radial side of the finger was intimately associated with the Dupuytren's band at the proximal phalanx and I carefully followed this.  The ulnar side did not seem to be close by as we had fatty tissue, but I could not identify a specific nerve there.  We left this fatty tissue alone to not disturb it.  We were able to get out the entire band from proximal to distal.  This was sent to pathology.  There was a small band extending toward the small finger as well and in the most proximal aspect of the incision, I followed a portion of that band and excised this, which freed up the band to the small finger.      At  the conclusion of the excision, the wound was thoroughly irrigated.  We then deflated the tourniquet and controlled bleeding with electrocautery.  The wound was then closed with interrupted 5-0 nylon sutures.  Betadine, Adaptic and sterile dressings were applied and an extension splint was applied, and the patient was taken to the recovery room in stable condition.  He will be allowed to remove his splint and dressings next week.  We will see him in 2 weeks to remove at least a portion of the sutures.      Shubham Eng MD        D: 2021   T: 2021   MT: PAKMT    Name:     PATRICIANOEMI  MRN:      [id number]        Account:        [id number]   :      1955           Procedure Date: 2021     Document: [id number]

## 2021-11-12 NOTE — ANESTHESIA PREPROCEDURE EVALUATION
Anesthesia Pre-Procedure Evaluation    Patient: Isabel Alvarez   MRN: [id number] : 1955        Preoperative Diagnosis: Dupuytren's contracture of hand [M72.0]    Procedure : Procedure(s):  RELEASE, DUPUYTREN CONTRACTURE, right ring finger          No past medical history on file.   No past surgical history on file.   Allergies   Allergen Reactions     Hctz [Hydrochlorothiazide] Rash     Penicillins Hives     Roxicet [Oxycodone-Acetaminophen] Hives      Social History     Tobacco Use     Smoking status: Current Some Day Smoker     Packs/day: 0.50     Years: 50.00     Pack years: 25.00     Types: Cigarettes     Smokeless tobacco: Never Used   Substance Use Topics     Alcohol use: Yes     Comment: 2 cases per week       Wt Readings from Last 1 Encounters:   10/26/21 75.8 kg (167 lb)        Anesthesia Evaluation   Pt has had prior anesthetic. Type: General and MAC.    History of anesthetic complications  - PONV.      ROS/MED HX  ENT/Pulmonary:     (+) tobacco use (.5), Current use,     Neurologic:  - neg neurologic ROS     Cardiovascular:     (+) hypertension-----    METS/Exercise Tolerance:     Hematologic:  - neg hematologic  ROS     Musculoskeletal:  - neg musculoskeletal ROS     GI/Hepatic:     (+) GERD, Asymptomatic on medication,     Renal/Genitourinary:  - neg Renal ROS     Endo:  - neg endo ROS     Psychiatric/Substance Use:  - neg psychiatric ROS     Infectious Disease:  - neg infectious disease ROS     Malignancy:  - neg malignancy ROS     Other:  - neg other ROS          Physical Exam    Airway        Mallampati: II   TM distance: > 3 FB   Neck ROM: full   Mouth opening: > 3 cm    Respiratory Devices and Support         Dental       (+) upper dentures      Cardiovascular   cardiovascular exam normal          Pulmonary   pulmonary exam normal                OUTSIDE LABS:  CBC:   Lab Results   Component Value Date    WBC 5.7 2020    HGB 13.9 2020    HCT 39.4 (L) 2020      07/29/2020     BMP:   Lab Results   Component Value Date     (L) 10/22/2021     (L) 07/29/2020    POTASSIUM 4.6 10/22/2021    POTASSIUM 4.9 07/29/2020    CHLORIDE 95 10/22/2021    CHLORIDE 95 07/29/2020    CO2 28 10/22/2021    CO2 26 07/29/2020    BUN 5 (L) 10/22/2021    BUN 6 (L) 07/29/2020    CR 0.83 10/22/2021    CR 0.82 07/29/2020     (H) 10/22/2021    GLC 87 07/29/2020     COAGS: No results found for: PTT, INR, FIBR  POC: No results found for: BGM, HCG, HCGS  HEPATIC:   Lab Results   Component Value Date    ALBUMIN 3.5 10/22/2021    PROTTOTAL 8.0 10/22/2021    ALT 49 10/22/2021    AST 82 (H) 10/22/2021    ALKPHOS 154 (H) 10/22/2021    BILITOTAL 0.9 10/22/2021     OTHER:   Lab Results   Component Value Date    YUNIOR 8.8 10/22/2021       Anesthesia Plan    ASA Status:  2   NPO Status:  NPO Appropriate    Anesthesia Type: General.     - Airway: LMA   Induction: Intravenous.   Maintenance: Inhalation.        Consents    Anesthesia Plan(s) and associated risks, benefits, and realistic alternatives discussed. Questions answered and patient/representative(s) expressed understanding.     - Discussed with:  Patient      - Extended Intubation/Ventilatory Support Discussed: No.      - Patient is DNR/DNI Status: No    Use of blood products discussed: No .     Postoperative Care    Pain management: IV analgesics, Multi-modal analgesia.   PONV prophylaxis: Ondansetron (or other 5HT-3), Dexamethasone or Solumedrol, Background Propofol Infusion     Comments:    The risks and benefits of anesthesia, and the alternatives where applicable, have been discussed with the patient, and they wish to proceed.               JOANNA Webster CRNA

## 2021-11-12 NOTE — ANESTHESIA POSTPROCEDURE EVALUATION
Patient: Isabel Alvarez    Procedure: Procedure(s):  RELEASE, DUPUYTREN CONTRACTURE, right ring finger       Diagnosis:Dupuytren's contracture of hand [M72.0]  Diagnosis Additional Information: No value filed.    Anesthesia Type:  General    Note:  Disposition: Outpatient   Postop Pain Control: Uneventful            Sign Out: Well controlled pain   PONV: No   Neuro/Psych: Uneventful            Sign Out: Acceptable/Baseline neuro status   Airway/Respiratory: Uneventful            Sign Out: Acceptable/Baseline resp. status   CV/Hemodynamics: Uneventful            Sign Out: Acceptable CV status   Other NRE: NONE   DID A NON-ROUTINE EVENT OCCUR? No    Event details/Postop Comments:  Pt was happy with anesthesia care.  No complications.  I will follow up with the pt if needed.           Last vitals:  Vitals Value Taken Time   /86 11/12/21 1245   Temp 98.24  F (36.8  C) 11/12/21 1246   Pulse 76 11/12/21 1247   Resp 30 11/12/21 1247   SpO2 95 % 11/12/21 1247   Vitals shown include unvalidated device data.    Electronically Signed By: JOANNA Webster CRNA  November 12, 2021  2:08 PM

## 2021-11-12 NOTE — BRIEF OP NOTE
POST OPERATIVE NOTE-IMMEDIATE :    Date of surgery: 11/12/2021    Preoperative Diagnosis:  Dupuytren's contracture of hand [M72.0]    Postoperative Diagnosis:  Same    Procedures:  Procedure(s):  RELEASE, DUPUYTREN CONTRACTURE, right ring finger    Prosthetic Devices: See Op Note    Surgeon(s) and Assistants (if any):  Attending Surgeon: Harshal Petty MD, MS  Assistant: Uday Little PA-C    Anesthesia:  General    Antibiotics: 900mg Cleocin    IV Fluids: 1 liter LR    UOP: 0, no mohan    Drains: none    Specimens: sent to pathology    Complications: None apparent.    Tourniquet Time: 55 minutes @ 250mmHg    Findings/Conclusions:  See Op Note for further detail.    Estimated Blood Loss: 30    Post Op Plan:  *Rest   *Ice   *Elevation   *non-weight bearing right upper extremity - strict  *Remain in splint at all times  *oral pain medications  *Home exercise program   *Return to clinic 2 weeks for wound check, suture removal, sooner if needed      Uday Little PA-C, CAQ (Ortho)  Supervising Physician: Harshal Petty M.D., M.S.  Dept. of Orthopaedic Surgery  [facility]

## 2021-11-12 NOTE — ANESTHESIA CARE TRANSFER NOTE
Patient: Isabel Alvarez    Procedure: Procedure(s):  RELEASE, DUPUYTREN CONTRACTURE, right ring finger       Diagnosis: Dupuytren's contracture of hand [M72.0]  Diagnosis Additional Information: No value filed.    Anesthesia Type:   General     Note:    Oropharynx: oropharynx clear of all foreign objects and spontaneously breathing  Level of Consciousness: drowsy  Oxygen Supplementation: face mask  Level of Supplemental Oxygen (L/min / FiO2): 6  Independent Airway: airway patency satisfactory and stable  Dentition: dentition unchanged  Vital Signs Stable: post-procedure vital signs reviewed and stable  Report to RN Given: handoff report given  Patient transferred to: PACU    Handoff Report: Identifed the Patient, Identified the Reponsible Provider, Reviewed the pertinent medical history, Discussed the surgical course, Reviewed Intra-OP anesthesia mangement and issues during anesthesia, Set expectations for post-procedure period and Allowed opportunity for questions and acknowledgement of understanding      Vitals:  Vitals Value Taken Time   /92 11/12/21 1225   Temp 97.7  F (36.5  C) 11/12/21 1226   Pulse 80 11/12/21 1226   Resp 11 11/12/21 1226   SpO2 94 % 11/12/21 1226   Vitals shown include unvalidated device data.    Electronically Signed By: JOANNA Webster CRNA  November 12, 2021  12:27 PM

## 2021-11-16 LAB
PATH REPORT.COMMENTS IMP SPEC: NORMAL
PATH REPORT.COMMENTS IMP SPEC: NORMAL
PATH REPORT.FINAL DX SPEC: NORMAL
PATH REPORT.GROSS SPEC: NORMAL
PATH REPORT.MICROSCOPIC SPEC OTHER STN: NORMAL
PATH REPORT.RELEVANT HX SPEC: NORMAL
PHOTO IMAGE: NORMAL

## 2021-11-16 PROCEDURE — 88304 TISSUE EXAM BY PATHOLOGIST: CPT | Mod: 26 | Performed by: PATHOLOGY

## 2021-11-17 ENCOUNTER — TELEPHONE (OUTPATIENT)
Dept: ORTHOPEDICS | Facility: CLINIC | Age: 66
End: 2021-11-17
Payer: COMMERCIAL

## 2021-11-17 NOTE — TELEPHONE ENCOUNTER
Called pt and informed him of Dr wright post op notes and to keep his incision clean and dry and to keep splint on per kasie, he does have an post op appt scheduled.    Per adriana asst:  Post Op Plan:  *Rest   *Ice   *Elevation   *non-weight bearing right upper extremity - strict  *Remain in splint at all times  *oral pain medications  *Home exercise program   *Return to clinic 2 weeks for wound check, suture removal, sooner if needed     April St Len CMA 11/17/2021 1:50 PM

## 2021-11-17 NOTE — TELEPHONE ENCOUNTER
Reason for Call:  Other appointment    Detailed comments: Patient calling regarding his splint that he has on and taking it on and off along with his bandages. Please call him    Phone Number Patient can be reached at: Home number on file [phone] (home)    Best Time: any    Can we leave a detailed message on this number? YES    Call taken on 11/17/2021 at 12:49 PM by Willow Earl

## 2021-11-19 NOTE — INTERVAL H&P NOTE
I have reviewed the surgical (or preoperative) H&P that is linked to this encounter, and examined the patient. There are no significant changes  
no history of blood product transfusion

## 2021-11-23 ENCOUNTER — OFFICE VISIT (OUTPATIENT)
Dept: ORTHOPEDICS | Facility: CLINIC | Age: 66
End: 2021-11-23
Payer: COMMERCIAL

## 2021-11-23 ENCOUNTER — TELEPHONE (OUTPATIENT)
Dept: ORTHOPEDICS | Facility: CLINIC | Age: 66
End: 2021-11-23

## 2021-11-23 VITALS
DIASTOLIC BLOOD PRESSURE: 92 MMHG | WEIGHT: 167 LBS | RESPIRATION RATE: 16 BRPM | SYSTOLIC BLOOD PRESSURE: 173 MMHG | HEIGHT: 70 IN | HEART RATE: 80 BPM | BODY MASS INDEX: 23.91 KG/M2

## 2021-11-23 DIAGNOSIS — M72.0 DUPUYTREN'S CONTRACTURE OF HAND: Primary | ICD-10-CM

## 2021-11-23 PROCEDURE — 99024 POSTOP FOLLOW-UP VISIT: CPT | Performed by: ORTHOPAEDIC SURGERY

## 2021-11-23 ASSESSMENT — MIFFLIN-ST. JEOR: SCORE: 1554.79

## 2021-11-23 NOTE — NURSING NOTE
Sutures removed today following Dupuytrens release. Wound is healing well. No drainage, redness of the skin, fever, or any further signs of infection. Tubegrip applied.       Karen Iqbal MS, ATC  Certified Athletic Trainer

## 2021-11-23 NOTE — PATIENT INSTRUCTIONS
Isabel to follow up with Primary Care provider regarding elevated blood pressure.  SMOKING CESSATION  What's in cigarette smoke? - Cigarette smoke contains over 4,000 chemicals. Nicotine is one of the main ingredients which is an insecticide/herbicide. It is poisonous to our nervous system, increases blood clotting risk, and decreases the body's defenses to fight off infection. Another chemical is Carbon Monoxide is an asphyxiating gas that permanently binds to blood cells and blocks the transport of oxygen. This leads to tissue death and decreases your metabolism. Tar is a chemical that coats your lungs and trachea which impairs new oxygen coming in and carbon dioxide getting out of your body.   How does smoking impact surgery? - Smoking is particularly hazardous with regards to surgery. Surgery puts stress on the body and a smoker's body is already under strain from these chemicals. Putting the two together, especially for an elective surgery, could be a recipe for disaster. Smoking before and after surgery increases your risk of heart problems, slow wound healing, delayed bone healing, blood clots, wound infection and anesthesia complications.   What are the benefits of quitting? - In 20 minutes your blood pressure will drop back down to normal. In 8 hours the carbon monoxide (a toxic gas) levels in your blood stream will drop by half, and oxygen levels will return to normal. In 48 hours your chance of having a heart attack will have decreased. All nicotine will have left your body. Your sense of taste and smell will return to a normal level. In 72 hours your bronchial tubes will relax, and your energy levels will increase. In 2 weeks your circulation will increase, and it will continue to improve for the next 10 weeks.    Recommendations for elective surgery - Ideally, patients should quit smoking 8 weeks before and at least 2 weeks after elective surgery in order to avoid complications. Simply cutting back on  the amount of cigarettes smoked per day does not offer any benefit or decrease the risk of poor wound healing, heart problems, and infection. Smokers should also start taking Vitamin C and B for two weeks before surgery and two weeks after surgery.    Ways to Stop Smokin. Nicotine patches, lozenges, or gum  2. Support Groups  3. Medications (see below)    List of Medications:  1. Varenicline Tartrate (CHANTIX) (may be discontinued by FDA as of 2021)  2. Bupropion HCL (WELLBUTRIN, ZYBAN) - note: make sure Wellbutrin is for smoking cessation and not other issues   3. Nicotine Patch (NICODERM)   4. Nicotine Inhaler (NICOTROL)   5. Nicotine Gum Nicotine Polacrilex   6. Nicotine Lozenge: Nicotine Polacrilex (COMMIT)   * [place] offers a smoking support group as well!  Please visit: [email or web address]  If you are interested in these, ask about getting a prescription or talk to your primary care doctor about what may be the best way for you to quit.       Continue range of motion.  Start gentle scar massage.  Return to clinic next week.

## 2021-11-23 NOTE — TELEPHONE ENCOUNTER
Reason for Call:  Other appointment    Detailed comments: Patient was in earlier to see Dr. Eng and he forgot to ask if he can get his hand wet? Please call him     Phone Number Patient can be reached at: Home number on file [phone] (home)    Best Time: any    Can we leave a detailed message on this number? YES    Call taken on 11/23/2021 at 12:03 PM by Willow Earl

## 2021-11-23 NOTE — PROGRESS NOTES
Follow up right ring Dupuytren's contracture excision of recurrent Dupuytren's contracture.  Surgery 11/12/21.  Wound is healing well.   Half of the sutures were removed.  We placed tubigrip.  Continue range of motion.  Gentle wound massage.  Return to clinic next week to remove remainder of sutures.

## 2021-11-23 NOTE — TELEPHONE ENCOUNTER
Called patient to let him know okay to use a wash cloth to clean lightly, no soaking the hand or to much water. Still a possibility of infection if to much water is applied.    Keila Edwards MA

## 2021-11-23 NOTE — LETTER
11/23/2021         RE: Isabel Alvarez  [address] [place] [address]        Dear Colleague,    Thank you for referring your patient, Isabel Alvarez, to the [facility]. Please see a copy of my visit note below.    Follow up right ring Dupuytren's contracture excision of recurrent Dupuytren's contracture.  Surgery 11/12/21.  Wound is healing well.   Half of the sutures were removed.  We placed tubigrip.  Continue range of motion.  Gentle wound massage.  Return to clinic next week to remove remainder of sutures.      Again, thank you for allowing me to participate in the care of your patient.        Sincerely,        Shubham Eng MD

## 2021-11-30 ENCOUNTER — OFFICE VISIT (OUTPATIENT)
Dept: ORTHOPEDICS | Facility: OTHER | Age: 66
End: 2021-11-30
Payer: COMMERCIAL

## 2021-11-30 VITALS
DIASTOLIC BLOOD PRESSURE: 96 MMHG | HEART RATE: 66 BPM | WEIGHT: 167 LBS | SYSTOLIC BLOOD PRESSURE: 183 MMHG | HEIGHT: 70 IN | BODY MASS INDEX: 23.91 KG/M2

## 2021-11-30 DIAGNOSIS — M72.0 DUPUYTREN'S CONTRACTURE OF HAND: Primary | ICD-10-CM

## 2021-11-30 PROCEDURE — 99024 POSTOP FOLLOW-UP VISIT: CPT | Performed by: ORTHOPAEDIC SURGERY

## 2021-11-30 ASSESSMENT — PAIN SCALES - GENERAL: PAINLEVEL: NO PAIN (0)

## 2021-11-30 ASSESSMENT — MIFFLIN-ST. JEOR: SCORE: 1549.79

## 2021-11-30 NOTE — NURSING NOTE
Remaining sutures removed today. Wound is healing well. No drainage, redness of the skin, fever, or any further signs of infection.    Karen Iqbal MS, ATC  Certified Athletic Trainer

## 2021-11-30 NOTE — PATIENT INSTRUCTIONS
Continue range of motion.  Start scar massage with vitamin-E cream.   Return to clinic 3-4 weeks for wound check.

## 2021-11-30 NOTE — PROGRESS NOTES
Follow up right ring Dupuytren's contracture excision of recurrent Dupuytren's contracture.  Surgery 11/12/21.  Wound is healing well. There is thickening of the skin out on the finger.  Remainder of the sutures were removed.    Continue range of motion.  Start scar massage with vitamin-E cream.   Return to clinic 3-4 weeks for wound check.

## 2021-11-30 NOTE — LETTER
11/30/2021         RE: Isabel Alvarez  [address] [facility] [address]        Dear Colleague,    Thank you for referring your patient, Isabel Alvarez, to the [facility] [place]. Please see a copy of my visit note below.    Follow up right ring Dupuytren's contracture excision of recurrent Dupuytren's contracture.  Surgery 11/12/21.  Wound is healing well. There is thickening of the skin out on the finger.  Remainder of the sutures were removed.    Continue range of motion.  Start scar massage with vitamin-E cream.   Return to clinic 3-4 weeks for wound check.      Again, thank you for allowing me to participate in the care of your patient.        Sincerely,        Shubham Eng MD

## 2021-12-17 ENCOUNTER — TELEPHONE (OUTPATIENT)
Dept: FAMILY MEDICINE | Facility: CLINIC | Age: 66
End: 2021-12-17
Payer: COMMERCIAL

## 2022-01-14 ENCOUNTER — IMMUNIZATION (OUTPATIENT)
Dept: FAMILY MEDICINE | Facility: CLINIC | Age: 67
End: 2022-01-14
Payer: COMMERCIAL

## 2022-01-14 DIAGNOSIS — Z23 HIGH PRIORITY FOR 2019-NCOV VACCINE: Primary | ICD-10-CM

## 2022-01-14 PROCEDURE — 91306 COVID-19,PF,MODERNA (18+ YRS BOOSTER .25ML): CPT

## 2022-01-14 PROCEDURE — 0064A COVID-19,PF,MODERNA (18+ YRS BOOSTER .25ML): CPT

## 2022-05-25 ENCOUNTER — TELEPHONE (OUTPATIENT)
Dept: FAMILY MEDICINE | Facility: CLINIC | Age: 67
End: 2022-05-25
Payer: COMMERCIAL

## 2022-05-25 DIAGNOSIS — E78.5 HYPERLIPIDEMIA LDL GOAL <130: ICD-10-CM

## 2022-05-25 NOTE — TELEPHONE ENCOUNTER
"Patient is calling and stated he wanted to make sure he was going to get his refills when his prescriptions are gone in 30 days.  Patient stated the bottles state \"0 refills\" and thought he needed to call to get refill request to clinic at this time.  Informed patient to phone his pharmacy approximately 7 days prior to running out of his medication.  His pharmacy will send a request to us at that time.  Patient stated understanding.    Sherly Saavedra RN    "

## 2022-05-27 RX ORDER — PRAVASTATIN SODIUM 40 MG
40 TABLET ORAL DAILY
Qty: 90 TABLET | Refills: 1 | Status: SHIPPED | OUTPATIENT
Start: 2022-05-27 | End: 2022-12-20

## 2022-06-17 DIAGNOSIS — E78.5 HYPERLIPIDEMIA LDL GOAL <130: ICD-10-CM

## 2022-06-17 DIAGNOSIS — K21.9 GASTROESOPHAGEAL REFLUX DISEASE WITHOUT ESOPHAGITIS: ICD-10-CM

## 2022-06-17 DIAGNOSIS — I10 BENIGN ESSENTIAL HYPERTENSION: ICD-10-CM

## 2022-06-20 RX ORDER — PRAVASTATIN SODIUM 40 MG
40 TABLET ORAL DAILY
Qty: 90 TABLET | Refills: 1 | OUTPATIENT
Start: 2022-06-20

## 2022-06-20 RX ORDER — LISINOPRIL 20 MG/1
20 TABLET ORAL DAILY
Qty: 90 TABLET | Refills: 3 | Status: SHIPPED | OUTPATIENT
Start: 2022-06-20 | End: 2023-02-06

## 2022-06-20 RX ORDER — OMEPRAZOLE 40 MG/1
CAPSULE, DELAYED RELEASE ORAL
Qty: 90 CAPSULE | Refills: 3 | Status: SHIPPED | OUTPATIENT
Start: 2022-06-20 | End: 2023-02-06

## 2022-06-20 RX ORDER — METOPROLOL SUCCINATE 200 MG/1
200 TABLET, EXTENDED RELEASE ORAL DAILY
Qty: 90 TABLET | Refills: 3 | Status: SHIPPED | OUTPATIENT
Start: 2022-06-20 | End: 2023-02-06

## 2022-06-20 NOTE — TELEPHONE ENCOUNTER
Metoprolol  Routing refill request to provider for review/approval because:  BP elevated      Lisinopril  Routing refill request to provider for review/approval because:  BP elevated      Prilosec  Routing refill request to provider for review/approval because:  Drug not on the [facility] refill protocol       Carlos Enrique Vásquez RN

## 2022-10-27 ENCOUNTER — IMMUNIZATION (OUTPATIENT)
Dept: FAMILY MEDICINE | Facility: CLINIC | Age: 67
End: 2022-10-27
Payer: COMMERCIAL

## 2022-10-27 PROCEDURE — 90471 IMMUNIZATION ADMIN: CPT

## 2022-10-27 PROCEDURE — 91313 COVID-19,PF,MODERNA BIVALENT: CPT

## 2022-10-27 PROCEDURE — 90682 RIV4 VACC RECOMBINANT DNA IM: CPT

## 2022-10-27 PROCEDURE — 0134A COVID-19,PF,MODERNA BIVALENT: CPT

## 2022-12-19 DIAGNOSIS — E78.5 HYPERLIPIDEMIA LDL GOAL <130: ICD-10-CM

## 2022-12-20 RX ORDER — PRAVASTATIN SODIUM 40 MG
TABLET ORAL
Qty: 90 TABLET | Refills: 0 | Status: SHIPPED | OUTPATIENT
Start: 2022-12-20 | End: 2023-02-06

## 2022-12-20 NOTE — TELEPHONE ENCOUNTER
Routing refill request to provider for review/approval because:  Last office visit: 10/22/2021   Future Office Visit:  Na    Sending to scheduling for yearly office visit due    Requested Prescriptions   Pending Prescriptions Disp Refills    pravastatin (PRAVACHOL) 40 MG tablet [Pharmacy Med Name: Pravastatin Sodium 40 MG Oral Tablet] 90 tablet 0     Sig: Take 1 tablet by mouth once daily       Statins Protocol Failed - 12/19/2022  9:07 AM        Failed - LDL on file in past 12 months     Recent Labs   Lab Test 10/22/21  0756   LDL 71

## 2022-12-22 NOTE — TELEPHONE ENCOUNTER
Spoke with patient and informed of note below appointment made.     Closing encounter.     Karen Renteria MA

## 2023-01-01 ENCOUNTER — PATIENT OUTREACH (OUTPATIENT)
Dept: GERIATRIC MEDICINE | Facility: CLINIC | Age: 68
End: 2023-01-01
Payer: MEDICARE

## 2023-01-01 LAB — NONINV COLON CA DNA+OCC BLD SCRN STL QL: NEGATIVE

## 2023-01-11 ENCOUNTER — PATIENT OUTREACH (OUTPATIENT)
Dept: GERIATRIC MEDICINE | Facility: CLINIC | Age: 68
End: 2023-01-11

## 2023-01-11 NOTE — LETTER
January 11, 2023    NOEMI GOMEZ  [address] [place] [facility] [address]      Dear Noemi:    As a member of [facility] ([facility] ([facility]), you are provided a care coordinator. I will be your new care coordinator as of 01/01/2023. I will be calling you soon to see how you are doing and determine your needs.    If you have any questions, please feel free to call me at [phone]. If you reach my voice mail, please leave a message and your phone number. If you are hearing impaired, please call the [place] Relay at [phone] or [phone] ([email or web address] relay service).    I look forward to speaking with you soon.    Sincerely,    Teresa Major RN, N    E-mail: [email or web address]  Phone: [phone]      [facility] is a health plan that contracts with both Medicare and the [place] Medical Assistance (Medicaid) program to provide benefits of both programs to enrollees. Enrollment in [facility] depends on contract renewal.      [facility]  [id number] DHS Approved ([id number])  [id number] (11/18)

## 2023-01-11 NOTE — PROGRESS NOTES
[facility] Care Coordination Contact    Member became effective with [facility] on 01/01/2023 with [facility].  Previous Health Plan: [facility]  Previous Care System: [facility]  Previous care coordinators name and number: [email or web address]   Waiver Type: N/A  Last MMIS Entry: Date 12/192022 and Type reassmt  MMIS visit date (and type) if different from above: N/A  Services Listed in MMIS: none  UTF received: No: Requested on 01/11/2023 copied you on email.   [facility] has member phone number as [phone].    Kandi Gu  Case Management Specialist   [facility]  [phone]

## 2023-01-12 ENCOUNTER — PATIENT OUTREACH (OUTPATIENT)
Dept: GERIATRIC MEDICINE | Facility: CLINIC | Age: 68
End: 2023-01-12

## 2023-01-12 NOTE — PROGRESS NOTES
[facility] Care Coordination Contact      [facility] Care System Change (Transfer)    Member is new enrollee to [facility] effective 1/1/23 with [facility] health [facility][note 3]. Member transferred from   [facility].    No home visit required because this care coordinator (CC) has received all required documentation from the previous CC.  Member was previously refusal of visit and member is refusal of visit at this time as well.     Writer t/c to member, introduced self as member's new CC. Confirmed with member that the welcome letter with writer's name and contact information has been received.    Required referral authorization information communicated to CMS: Not applicable    [facility] Refusal Telephone Assessment    Member refused home visit HRA on 1/12/23 (reason: member lives with his parents - he is independent of all ADL and IADL's and states he has no needs at this time.)  Refusal date will be 1/1/23 as new to [facility].    ER visits: No  Hospitalizations: No  Health concerns: None  Falls/Injuries: No  ADL/IADL Dependencies: None   Member currently receiving the following services: None    Informal support(s): lives with his parents    Advanced Care Planning discussion, complete code section.    [facility] [place] [facility] sponsored benefits: Shared information re: Silver Sneakers/gym memberships, ASA, Calcium +D.    Follow-Up Plan: Member informed of future contact, plan to f/u with member with a 6 month telephone assessment and offer a home visit.  Contact information shared with member and family, encouraged member to call with any questions or concerns at any time.    Requested CMS to mail refusal letter.    Teresa Major RN, PHN  [facility]

## 2023-01-12 NOTE — LETTER
January 16, 2023    NOEMI GOMEZ  [address] [facility] [address]        Dear Noemi:    As a member of [facility]'s [facility], we offer a health risk assessment at no cost to you. I know you don't want to have the assessment right now. If you change your mind, please call me at the number below.    Who performs the health risk assessment?  A [facility] Care Coordinator performs the assessment. Our Care Coordinators can also help you understand your benefits. They can tell you about services to aid you at home, such as managing your care with your doctors if your health worsens.    Our Care Coordinators are here for you if you need:    Support for activities you used to do by yourself (including making meals, bathing, and paying bills)    Equipment for bathroom or home safety    Help finding a new place to live    Information on staying healthy, preventing falls and immunizations    Questions?  If you have questions, or you would like to do the assessment, call me at [phone]. TTY users call [phone]. I'm here from 8am to 5pm. I may reach out to you again soon.      Sincerely,        Teresa Major RN, PHN    E-mail: [email or web address]  Phone: [phone]      [facility]    <[id number] accepted    [id number] (12/2021)  [id number]>

## 2023-01-12 NOTE — Clinical Note
This is FYI only - member declined need for annual health risk assessment.  Teresa Major RN, PHN [facility]

## 2023-01-16 NOTE — PROGRESS NOTES
"[facility] Care Coordination Contact    Per CC, mailed client a \"Refusal of Home Visit\" letter.    Kandi Gu  Case Management Specialist   [facility]  [phone]      "

## 2023-02-06 ENCOUNTER — OFFICE VISIT (OUTPATIENT)
Dept: INTERNAL MEDICINE | Facility: CLINIC | Age: 68
End: 2023-02-06
Payer: COMMERCIAL

## 2023-02-06 VITALS
OXYGEN SATURATION: 98 % | BODY MASS INDEX: 22.9 KG/M2 | SYSTOLIC BLOOD PRESSURE: 136 MMHG | WEIGHT: 160 LBS | TEMPERATURE: 97 F | DIASTOLIC BLOOD PRESSURE: 84 MMHG | HEART RATE: 65 BPM | HEIGHT: 70 IN | RESPIRATION RATE: 16 BRPM

## 2023-02-06 DIAGNOSIS — K21.9 GASTROESOPHAGEAL REFLUX DISEASE WITHOUT ESOPHAGITIS: ICD-10-CM

## 2023-02-06 DIAGNOSIS — E78.5 HYPERLIPIDEMIA LDL GOAL <130: ICD-10-CM

## 2023-02-06 DIAGNOSIS — Z72.0 TOBACCO ABUSE DISORDER: ICD-10-CM

## 2023-02-06 DIAGNOSIS — I10 BENIGN ESSENTIAL HYPERTENSION: ICD-10-CM

## 2023-02-06 DIAGNOSIS — Z00.00 MEDICARE ANNUAL WELLNESS VISIT, INITIAL: Primary | ICD-10-CM

## 2023-02-06 DIAGNOSIS — H54.40 BLINDNESS OF LEFT EYE, UNSPECIFIED RIGHT EYE VISUAL IMPAIRMENT CATEGORY: ICD-10-CM

## 2023-02-06 DIAGNOSIS — Z23 NEED FOR PNEUMOCOCCAL VACCINE: ICD-10-CM

## 2023-02-06 DIAGNOSIS — Z12.5 SCREENING FOR PROSTATE CANCER: ICD-10-CM

## 2023-02-06 LAB
ALBUMIN SERPL BCG-MCNC: 4.3 G/DL (ref 3.5–5.2)
ALP SERPL-CCNC: 150 U/L (ref 40–129)
ALT SERPL W P-5'-P-CCNC: 37 U/L (ref 10–50)
ANION GAP SERPL CALCULATED.3IONS-SCNC: 11 MMOL/L (ref 7–15)
AST SERPL W P-5'-P-CCNC: 70 U/L (ref 10–50)
BILIRUB SERPL-MCNC: 1.4 MG/DL
BUN SERPL-MCNC: 7.3 MG/DL (ref 8–23)
CALCIUM SERPL-MCNC: 9.6 MG/DL (ref 8.8–10.2)
CHLORIDE SERPL-SCNC: 90 MMOL/L (ref 98–107)
CHOLEST SERPL-MCNC: 165 MG/DL
CREAT SERPL-MCNC: 0.75 MG/DL (ref 0.67–1.17)
DEPRECATED HCO3 PLAS-SCNC: 27 MMOL/L (ref 22–29)
GFR SERPL CREATININE-BSD FRML MDRD: >90 ML/MIN/1.73M2
GLUCOSE SERPL-MCNC: 112 MG/DL (ref 70–99)
HDLC SERPL-MCNC: 79 MG/DL
LDLC SERPL CALC-MCNC: 70 MG/DL
NONHDLC SERPL-MCNC: 86 MG/DL
POTASSIUM SERPL-SCNC: 4.2 MMOL/L (ref 3.4–5.3)
PROT SERPL-MCNC: 8.1 G/DL (ref 6.4–8.3)
PSA SERPL-MCNC: 0.86 NG/ML (ref 0–4.5)
SODIUM SERPL-SCNC: 128 MMOL/L (ref 136–145)
TRIGL SERPL-MCNC: 79 MG/DL

## 2023-02-06 PROCEDURE — 80061 LIPID PANEL: CPT | Performed by: INTERNAL MEDICINE

## 2023-02-06 PROCEDURE — G0103 PSA SCREENING: HCPCS | Performed by: INTERNAL MEDICINE

## 2023-02-06 PROCEDURE — 99213 OFFICE O/P EST LOW 20 MIN: CPT | Mod: 25 | Performed by: INTERNAL MEDICINE

## 2023-02-06 PROCEDURE — 90471 IMMUNIZATION ADMIN: CPT | Performed by: INTERNAL MEDICINE

## 2023-02-06 PROCEDURE — 99397 PER PM REEVAL EST PAT 65+ YR: CPT | Mod: 25 | Performed by: INTERNAL MEDICINE

## 2023-02-06 PROCEDURE — 90677 PCV20 VACCINE IM: CPT | Performed by: INTERNAL MEDICINE

## 2023-02-06 PROCEDURE — 80053 COMPREHEN METABOLIC PANEL: CPT | Performed by: INTERNAL MEDICINE

## 2023-02-06 PROCEDURE — 36415 COLL VENOUS BLD VENIPUNCTURE: CPT | Performed by: INTERNAL MEDICINE

## 2023-02-06 RX ORDER — PRAVASTATIN SODIUM 40 MG
40 TABLET ORAL DAILY
Qty: 90 TABLET | Refills: 3 | Status: SHIPPED | OUTPATIENT
Start: 2023-02-06 | End: 2024-01-01

## 2023-02-06 RX ORDER — LISINOPRIL 20 MG/1
20 TABLET ORAL DAILY
Qty: 90 TABLET | Refills: 3 | Status: SHIPPED | OUTPATIENT
Start: 2023-02-06 | End: 2024-01-01

## 2023-02-06 RX ORDER — METOPROLOL SUCCINATE 200 MG/1
200 TABLET, EXTENDED RELEASE ORAL DAILY
Qty: 90 TABLET | Refills: 3 | Status: SHIPPED | OUTPATIENT
Start: 2023-02-06 | End: 2024-01-01

## 2023-02-06 RX ORDER — OMEPRAZOLE 40 MG/1
CAPSULE, DELAYED RELEASE ORAL
Qty: 90 CAPSULE | Refills: 3 | Status: SHIPPED | OUTPATIENT
Start: 2023-02-06 | End: 2024-01-01

## 2023-02-06 ASSESSMENT — ACTIVITIES OF DAILY LIVING (ADL): CURRENT_FUNCTION: NO ASSISTANCE NEEDED

## 2023-02-06 NOTE — PROGRESS NOTES
Prior to immunization administration, verified patients identity using patient s name and date of birth. Please see Immunization Activity for additional information.     Screening Questionnaire for Adult Immunization    Are you sick today?   No   Do you have allergies to medications, food, a vaccine component or latex?   Yes   Have you ever had a serious reaction after receiving a vaccination?   No   Do you have a long-term health problem with heart, lung, kidney, or metabolic disease (e.g., diabetes), asthma, a blood disorder, no spleen, complement component deficiency, a cochlear implant, or a spinal fluid leak?  Are you on long-term aspirin therapy?   No   Do you have cancer, leukemia, HIV/AIDS, or any other immune system problem?   No   Do you have a parent, brother, or sister with an immune system problem?   No   In the past 3 months, have you taken medications that affect  your immune system, such as prednisone, other steroids, or anticancer drugs; drugs for the treatment of rheumatoid arthritis, Crohn s disease, or psoriasis; or have you had radiation treatments?   No   Have you had a seizure, or a brain or other nervous system problem?   No   During the past year, have you received a transfusion of blood or blood    products, or been given immune (gamma) globulin or antiviral drug?   No   For women: Are you pregnant or is there a chance you could become       pregnant during the next month?   No   Have you received any vaccinations in the past 4 weeks?   No     Immunization questionnaire was positive for at least one answer.  Notified Yes.        Per orders of Dr. Josias Ch, injection of Pneumo 20 given by Alexandria Tierney CMA. Patient instructed to remain in clinic for 15 minutes afterwards, and to report any adverse reaction to me immediately.       Screening performed by Alxeandria Tierney CMA on 2/6/2023 at 8:27 AM.

## 2023-02-07 ENCOUNTER — TELEPHONE (OUTPATIENT)
Dept: INTERNAL MEDICINE | Facility: CLINIC | Age: 68
End: 2023-02-07
Payer: COMMERCIAL

## 2023-02-07 DIAGNOSIS — Z12.11 SCREEN FOR COLON CANCER: Primary | ICD-10-CM

## 2023-02-10 ENCOUNTER — HOSPITAL ENCOUNTER (OUTPATIENT)
Dept: CT IMAGING | Facility: CLINIC | Age: 68
Discharge: HOME | End: 2023-02-10
Attending: INTERNAL MEDICINE | Admitting: INTERNAL MEDICINE
Payer: COMMERCIAL

## 2023-02-10 DIAGNOSIS — Z72.0 TOBACCO ABUSE DISORDER: ICD-10-CM

## 2023-02-10 PROCEDURE — 71271 CT THORAX LUNG CANCER SCR C-: CPT

## 2023-02-13 ENCOUNTER — TELEPHONE (OUTPATIENT)
Dept: INTERNAL MEDICINE | Facility: CLINIC | Age: 68
End: 2023-02-13
Payer: COMMERCIAL

## 2023-02-13 NOTE — TELEPHONE ENCOUNTER
Received call from [facility] with a significant incidental finding on patients CT Chest Lung Cancer Screening:    See under Impression #2:  Significant Incidental Finding(s):  Category S: Yes. Coronary artery calcium moderate or severe. 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

## 2023-04-07 ENCOUNTER — LAB (OUTPATIENT)
Dept: INTERNAL MEDICINE | Facility: CLINIC | Age: 68
End: 2023-04-07
Payer: COMMERCIAL

## 2023-04-07 DIAGNOSIS — Z12.11 SCREEN FOR COLON CANCER: ICD-10-CM

## 2023-06-27 ENCOUNTER — PATIENT OUTREACH (OUTPATIENT)
Dept: GERIATRIC MEDICINE | Facility: CLINIC | Age: 68
End: 2023-06-27
Payer: MEDICARE

## 2023-06-27 NOTE — PROGRESS NOTES
[facility] Care Coordination Contact      [facility] Six-Month Telephone Assessment    6 month telephone assessment completed on 6/27/23 with member.    ER visits: No  Hospitalizations: No  TCU stays: No  Significant health status changes: none  Falls/Injuries: No  ADL/IADL changes: No  Changes in services: No    Caregiver Assessment follow up:  N/A    Goals: See POC in chart for goal progress documentation.  Isabel states that he is doing well - he is healthy and managing well.   He did call [employer] for dentist but doesn't want to drive to [place] or to [employer] - wants in [place].  He states that he is going keep checking around - he doesn't want CC assistance - states he can do on his own.     Will see member in 6 months for an annual health risk assessment.   Encouraged member to call CC with any questions or concerns in the meantime.     Teresa Major RN, PHN  [facility]

## 2023-12-14 NOTE — Clinical Note
This is an FYI - member declined need for a health risk assessment via home visit.   Thank you,  Teresa Major RN, [facility]

## 2023-12-14 NOTE — LETTER
December 26, 2023    NOEMI GOMEZ  [address] [facility] [address]        Dear Noemi:    As a member of [facility]'s [facility], we offer a health risk assessment at no cost to you. I know you don't want to have the assessment right now. If you change your mind, please call me at the number below.    Who performs the health risk assessment?  A [facility] Care Coordinator performs the assessment. Our Care Coordinators can also help you understand your benefits. They can tell you about services to aid you at home, such as managing your care with your doctors if your health worsens.    Our Care Coordinators are here for you if you need:  Support for activities you used to do by yourself (including making meals, bathing, and paying bills)  Equipment for bathroom or home safety  Help finding a new place to live  Information on staying healthy, preventing falls and immunizations    Questions?  If you have questions, or you would like to do the assessment, call me at [phone]. TTY users call [phone]. I'm here from 8am to 5pm. I may reach out to you again soon.      Sincerely,        Teresa Major RN, PHN    E-mail: [email or web address]  Phone: [phone]      [facility]    <[id number] accepted    [id number] (12/2021)  [id number]>

## 2023-12-14 NOTE — PROGRESS NOTES
[facility] Care Coordination Contact      [facility] Refusal Telephone Assessment    Member refused home visit HRA on 12/14/23 (reason: no needs and member states that he is going to go off of MA and [employer] - he spoke with his financial worker at [facility] today and he is planning to go on a different plan 1/1/24 due to his income. ).    ER visits: No  Hospitalizations: No  Health concerns: none  Falls/Injuries: No  ADL/IADL Dependencies: none        Member currently receiving the following home care services:    none  Member currently receiving the following community resources:  none  Informal support(s):  family    Advanced Care Planning discussion, complete code section.    List of [place] hospitals according to the [employer] [facility] sponsored benefits: Shared information re: Silver [employer]/gym memberships, ASA, Calcium +D.    Follow-Up Plan: Member informed of future contact, plan to f/u with member with a 6 month telephone assessment and offer a home visit.  Contact information shared with member and family, encouraged member to call with any questions or concerns at any time.    This CC note routed to PCP, Josias Ch RN, PHN  [facility]

## 2023-12-26 NOTE — PROGRESS NOTES
"Per CC, mailed client a \"Refusal of Home Visit\" letter.    Kandi Gu  Case Management Specialist   [facility]  [phone]    "

## 2024-01-01 ENCOUNTER — TELEPHONE (OUTPATIENT)
Dept: INTERNAL MEDICINE | Facility: CLINIC | Age: 69
End: 2024-01-01
Payer: MEDICARE

## 2024-01-01 ENCOUNTER — PATIENT OUTREACH (OUTPATIENT)
Dept: CARE COORDINATION | Facility: CLINIC | Age: 69
End: 2024-01-01
Payer: MEDICARE

## 2024-01-01 ENCOUNTER — NURSE TRIAGE (OUTPATIENT)
Dept: INTERNAL MEDICINE | Facility: CLINIC | Age: 69
End: 2024-01-01
Payer: MEDICARE

## 2024-01-01 ENCOUNTER — OFFICE VISIT (OUTPATIENT)
Dept: INTERNAL MEDICINE | Facility: CLINIC | Age: 69
End: 2024-01-01
Payer: MEDICARE

## 2024-01-01 ENCOUNTER — TELEPHONE (OUTPATIENT)
Dept: FAMILY MEDICINE | Facility: CLINIC | Age: 69
End: 2024-01-01
Payer: MEDICARE

## 2024-01-01 ENCOUNTER — APPOINTMENT (OUTPATIENT)
Dept: PHYSICAL THERAPY | Facility: CLINIC | Age: 69
DRG: 442 | End: 2024-01-01
Attending: INTERNAL MEDICINE
Payer: MEDICARE

## 2024-01-01 ENCOUNTER — HOSPITAL ENCOUNTER (OUTPATIENT)
Dept: CT IMAGING | Facility: CLINIC | Age: 69
Discharge: HOME | End: 2024-04-26
Attending: INTERNAL MEDICINE | Admitting: INTERNAL MEDICINE
Payer: MEDICARE

## 2024-01-01 ENCOUNTER — TELEPHONE (OUTPATIENT)
Dept: INTERNAL MEDICINE | Facility: CLINIC | Age: 69
End: 2024-01-01

## 2024-01-01 ENCOUNTER — APPOINTMENT (OUTPATIENT)
Dept: PHYSICAL THERAPY | Facility: CLINIC | Age: 69
DRG: 442 | End: 2024-01-01
Attending: HOSPITALIST
Payer: MEDICARE

## 2024-01-01 ENCOUNTER — PATIENT OUTREACH (OUTPATIENT)
Dept: CARE COORDINATION | Facility: CLINIC | Age: 69
End: 2024-01-01

## 2024-01-01 ENCOUNTER — HOSPITAL ENCOUNTER (INPATIENT)
Facility: CLINIC | Age: 69
LOS: 2 days | Discharge: HOME | DRG: 442 | End: 2024-09-23
Attending: HOSPITALIST | Admitting: INTERNAL MEDICINE
Payer: MEDICARE

## 2024-01-01 ENCOUNTER — APPOINTMENT (OUTPATIENT)
Dept: ULTRASOUND IMAGING | Facility: CLINIC | Age: 69
DRG: 442 | End: 2024-01-01
Attending: HOSPITALIST
Payer: MEDICARE

## 2024-01-01 ENCOUNTER — APPOINTMENT (OUTPATIENT)
Dept: ULTRASOUND IMAGING | Facility: CLINIC | Age: 69
DRG: 641 | End: 2024-01-01
Attending: FAMILY MEDICINE
Payer: MEDICARE

## 2024-01-01 ENCOUNTER — OFFICE VISIT (OUTPATIENT)
Dept: FAMILY MEDICINE | Facility: CLINIC | Age: 69
End: 2024-01-01
Payer: MEDICARE

## 2024-01-01 ENCOUNTER — HOSPITAL ENCOUNTER (INPATIENT)
Facility: CLINIC | Age: 69
LOS: 4 days | Discharge: HOME | DRG: 641 | End: 2024-08-24
Attending: EMERGENCY MEDICINE | Admitting: FAMILY MEDICINE
Payer: MEDICARE

## 2024-01-01 ENCOUNTER — APPOINTMENT (OUTPATIENT)
Dept: GENERAL RADIOLOGY | Facility: CLINIC | Age: 69
DRG: 442 | End: 2024-01-01
Attending: PHYSICIAN ASSISTANT
Payer: MEDICARE

## 2024-01-01 ENCOUNTER — PATIENT OUTREACH (OUTPATIENT)
Dept: GERIATRIC MEDICINE | Facility: CLINIC | Age: 69
End: 2024-01-01
Payer: MEDICARE

## 2024-01-01 ENCOUNTER — APPOINTMENT (OUTPATIENT)
Dept: OCCUPATIONAL THERAPY | Facility: CLINIC | Age: 69
DRG: 641 | End: 2024-01-01
Attending: FAMILY MEDICINE
Payer: MEDICARE

## 2024-01-01 ENCOUNTER — HOSPITAL ENCOUNTER (EMERGENCY)
Facility: CLINIC | Age: 69
Discharge: HOME | End: 2024-08-26
Attending: EMERGENCY MEDICINE | Admitting: EMERGENCY MEDICINE
Payer: MEDICARE

## 2024-01-01 ENCOUNTER — PATIENT OUTREACH (OUTPATIENT)
Dept: FAMILY MEDICINE | Facility: CLINIC | Age: 69
End: 2024-01-01

## 2024-01-01 ENCOUNTER — HOSPITAL ENCOUNTER (EMERGENCY)
Facility: CLINIC | Age: 69
Discharge: ANOTHER HOSPITAL | DRG: 442 | End: 2024-09-20
Attending: PHYSICIAN ASSISTANT | Admitting: PHYSICIAN ASSISTANT
Payer: MEDICARE

## 2024-01-01 ENCOUNTER — APPOINTMENT (OUTPATIENT)
Dept: ULTRASOUND IMAGING | Facility: CLINIC | Age: 69
DRG: 442 | End: 2024-01-01
Attending: INTERNAL MEDICINE
Payer: MEDICARE

## 2024-01-01 ENCOUNTER — APPOINTMENT (OUTPATIENT)
Dept: GENERAL RADIOLOGY | Facility: CLINIC | Age: 69
DRG: 641 | End: 2024-01-01
Attending: FAMILY MEDICINE
Payer: MEDICARE

## 2024-01-01 ENCOUNTER — APPOINTMENT (OUTPATIENT)
Dept: ULTRASOUND IMAGING | Facility: CLINIC | Age: 69
DRG: 641 | End: 2024-01-01
Attending: EMERGENCY MEDICINE
Payer: MEDICARE

## 2024-01-01 ENCOUNTER — HOSPITAL ENCOUNTER (OUTPATIENT)
Dept: GENERAL RADIOLOGY | Facility: CLINIC | Age: 69
Discharge: HOME | DRG: 641 | End: 2024-08-19
Payer: MEDICARE

## 2024-01-01 VITALS
SYSTOLIC BLOOD PRESSURE: 119 MMHG | DIASTOLIC BLOOD PRESSURE: 85 MMHG | TEMPERATURE: 98.2 F | RESPIRATION RATE: 18 BRPM | BODY MASS INDEX: 25.31 KG/M2 | OXYGEN SATURATION: 92 % | WEIGHT: 167 LBS | HEART RATE: 124 BPM | HEIGHT: 68 IN

## 2024-01-01 VITALS
TEMPERATURE: 97.6 F | BODY MASS INDEX: 25.45 KG/M2 | RESPIRATION RATE: 23 BRPM | WEIGHT: 167.4 LBS | DIASTOLIC BLOOD PRESSURE: 79 MMHG | OXYGEN SATURATION: 97 % | HEART RATE: 124 BPM | SYSTOLIC BLOOD PRESSURE: 133 MMHG

## 2024-01-01 VITALS
SYSTOLIC BLOOD PRESSURE: 110 MMHG | DIASTOLIC BLOOD PRESSURE: 58 MMHG | WEIGHT: 143.1 LBS | TEMPERATURE: 96.9 F | BODY MASS INDEX: 21.69 KG/M2 | HEIGHT: 68 IN | HEART RATE: 87 BPM | OXYGEN SATURATION: 100 % | RESPIRATION RATE: 18 BRPM

## 2024-01-01 VITALS
TEMPERATURE: 97.5 F | BODY MASS INDEX: 25.48 KG/M2 | WEIGHT: 172 LBS | RESPIRATION RATE: 16 BRPM | OXYGEN SATURATION: 100 % | SYSTOLIC BLOOD PRESSURE: 126 MMHG | HEART RATE: 69 BPM | DIASTOLIC BLOOD PRESSURE: 72 MMHG | HEIGHT: 69 IN

## 2024-01-01 VITALS
WEIGHT: 151.24 LBS | RESPIRATION RATE: 20 BRPM | OXYGEN SATURATION: 99 % | DIASTOLIC BLOOD PRESSURE: 82 MMHG | SYSTOLIC BLOOD PRESSURE: 113 MMHG | HEART RATE: 112 BPM | TEMPERATURE: 98.3 F | BODY MASS INDEX: 23 KG/M2

## 2024-01-01 VITALS
DIASTOLIC BLOOD PRESSURE: 65 MMHG | HEART RATE: 81 BPM | SYSTOLIC BLOOD PRESSURE: 129 MMHG | OXYGEN SATURATION: 96 % | TEMPERATURE: 98.6 F | RESPIRATION RATE: 20 BRPM

## 2024-01-01 VITALS
BODY MASS INDEX: 22.05 KG/M2 | TEMPERATURE: 97.7 F | HEIGHT: 70 IN | DIASTOLIC BLOOD PRESSURE: 68 MMHG | SYSTOLIC BLOOD PRESSURE: 136 MMHG | OXYGEN SATURATION: 97 % | RESPIRATION RATE: 16 BRPM | HEART RATE: 62 BPM | WEIGHT: 154 LBS

## 2024-01-01 VITALS
RESPIRATION RATE: 16 BRPM | TEMPERATURE: 98.2 F | SYSTOLIC BLOOD PRESSURE: 124 MMHG | HEIGHT: 68 IN | DIASTOLIC BLOOD PRESSURE: 61 MMHG | HEART RATE: 121 BPM | OXYGEN SATURATION: 99 % | WEIGHT: 156.7 LBS | BODY MASS INDEX: 23.75 KG/M2

## 2024-01-01 DIAGNOSIS — D68.9 COAGULOPATHY (H): ICD-10-CM

## 2024-01-01 DIAGNOSIS — F17.210 NICOTINE DEPENDENCE, CIGARETTES, UNCOMPLICATED: ICD-10-CM

## 2024-01-01 DIAGNOSIS — R21 RASH: ICD-10-CM

## 2024-01-01 DIAGNOSIS — I10 BENIGN ESSENTIAL HYPERTENSION: ICD-10-CM

## 2024-01-01 DIAGNOSIS — E83.42 HYPOMAGNESEMIA: ICD-10-CM

## 2024-01-01 DIAGNOSIS — R41.0 CONFUSION: ICD-10-CM

## 2024-01-01 DIAGNOSIS — E87.1 HYPONATREMIA: ICD-10-CM

## 2024-01-01 DIAGNOSIS — N17.9 ACUTE KIDNEY INJURY (H): ICD-10-CM

## 2024-01-01 DIAGNOSIS — J44.1 CHRONIC OBSTRUCTIVE PULMONARY DISEASE WITH (ACUTE) EXACERBATION (H): ICD-10-CM

## 2024-01-01 DIAGNOSIS — F10.29 ALCOHOL DEPENDENCE WITH UNSPECIFIED ALCOHOL-INDUCED DISORDER (H): ICD-10-CM

## 2024-01-01 DIAGNOSIS — Z00.00 MEDICARE ANNUAL WELLNESS VISIT, INITIAL: Primary | ICD-10-CM

## 2024-01-01 DIAGNOSIS — K76.82 HEPATIC ENCEPHALOPATHY (H): Primary | ICD-10-CM

## 2024-01-01 DIAGNOSIS — R60.9 EDEMA, UNSPECIFIED TYPE: ICD-10-CM

## 2024-01-01 DIAGNOSIS — K70.31 ALCOHOLIC CIRRHOSIS OF LIVER WITH ASCITES (H): ICD-10-CM

## 2024-01-01 DIAGNOSIS — E87.79 OTHER HYPERVOLEMIA: ICD-10-CM

## 2024-01-01 DIAGNOSIS — K70.31 ALCOHOLIC CIRRHOSIS OF LIVER WITH ASCITES (H): Primary | ICD-10-CM

## 2024-01-01 DIAGNOSIS — I50.9 CHF (CONGESTIVE HEART FAILURE) (H): ICD-10-CM

## 2024-01-01 DIAGNOSIS — L03.116 CELLULITIS OF LEFT LOWER EXTREMITY: ICD-10-CM

## 2024-01-01 DIAGNOSIS — I95.89 OTHER SPECIFIED HYPOTENSION: ICD-10-CM

## 2024-01-01 DIAGNOSIS — N13.30 HYDRONEPHROSIS, UNSPECIFIED HYDRONEPHROSIS TYPE: ICD-10-CM

## 2024-01-01 DIAGNOSIS — R21 RASH: Primary | ICD-10-CM
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
## 2024-01-01 DIAGNOSIS — L03.90 CELLULITIS, UNSPECIFIED CELLULITIS SITE: ICD-10-CM

## 2024-01-01 DIAGNOSIS — E87.6 HYPOKALEMIA: Primary | ICD-10-CM

## 2024-01-01 DIAGNOSIS — K21.9 GASTROESOPHAGEAL REFLUX DISEASE WITHOUT ESOPHAGITIS: ICD-10-CM

## 2024-01-01 DIAGNOSIS — L21.9 SEBORRHEIC DERMATITIS: ICD-10-CM

## 2024-01-01 DIAGNOSIS — Z72.0 TOBACCO ABUSE DISORDER: ICD-10-CM

## 2024-01-01 DIAGNOSIS — R33.9 URINARY RETENTION: ICD-10-CM

## 2024-01-01 DIAGNOSIS — E83.39 HYPOPHOSPHATEMIA: ICD-10-CM

## 2024-01-01 DIAGNOSIS — Z87.891 PERSONAL HISTORY OF TOBACCO USE: ICD-10-CM

## 2024-01-01 DIAGNOSIS — E78.5 HYPERLIPIDEMIA LDL GOAL <130: ICD-10-CM

## 2024-01-01 DIAGNOSIS — S86.899A MEDIAL TIBIAL STRESS SYNDROME, UNSPECIFIED LATERALITY, INITIAL ENCOUNTER: ICD-10-CM

## 2024-01-01 DIAGNOSIS — Z87.891 PERSONAL HISTORY OF NICOTINE DEPENDENCE: ICD-10-CM

## 2024-01-01 DIAGNOSIS — Z12.5 SCREENING FOR PROSTATE CANCER: ICD-10-CM

## 2024-01-01 DIAGNOSIS — R79.89 ELEVATED LFTS: ICD-10-CM

## 2024-01-01 DIAGNOSIS — E78.5 HYPERLIPIDEMIA LDL GOAL <100: ICD-10-CM

## 2024-01-01 DIAGNOSIS — K76.82 HEPATIC ENCEPHALOPATHY (H): ICD-10-CM

## 2024-01-01 LAB
% LINING CELLS, BODY FLUID: 4 %
% LINING CELLS, BODY FLUID: 5 %
ABSOLUTE NEUTROPHILS, BODY FLUID: 4.4 /UL
ABSOLUTE NEUTROPHILS, BODY FLUID: 5.9 /UL
ALBUMIN BODY FLUID SOURCE: NORMAL
ALBUMIN BODY FLUID SOURCE: NORMAL
ALBUMIN FLD-MCNC: 0.6 G/DL
ALBUMIN FLD-MCNC: 0.7 G/DL
ALBUMIN SERPL BCG-MCNC: 2.4 G/DL (ref 3.5–5.2)
ALBUMIN SERPL BCG-MCNC: 2.5 G/DL (ref 3.5–5.2)
ALBUMIN SERPL BCG-MCNC: 2.6 G/DL (ref 3.5–5.2)
ALBUMIN SERPL BCG-MCNC: 2.7 G/DL (ref 3.5–5.2)
ALBUMIN SERPL BCG-MCNC: 2.8 G/DL (ref 3.5–5.2)
ALBUMIN SERPL BCG-MCNC: 2.8 G/DL (ref 3.5–5.2)
ALBUMIN SERPL BCG-MCNC: 2.9 G/DL (ref 3.5–5.2)
ALBUMIN SERPL BCG-MCNC: 2.9 G/DL (ref 3.5–5.2)
ALBUMIN SERPL BCG-MCNC: 3.3 G/DL (ref 3.5–5.2)
ALBUMIN SERPL BCG-MCNC: 3.3 G/DL (ref 3.5–5.2)
ALBUMIN SERPL BCG-MCNC: 3.7 G/DL (ref 3.5–5.2)
ALBUMIN UR-MCNC: NEGATIVE MG/DL
ALP SERPL-CCNC: 102 U/L (ref 40–150)
ALP SERPL-CCNC: 107 U/L (ref 40–150)
ALP SERPL-CCNC: 110 U/L (ref 40–150)
ALP SERPL-CCNC: 114 U/L (ref 40–150)
ALP SERPL-CCNC: 117 U/L (ref 40–150)
ALP SERPL-CCNC: 122 U/L (ref 40–150)
ALP SERPL-CCNC: 125 U/L (ref 40–150)
ALP SERPL-CCNC: 131 U/L (ref 40–150)
ALP SERPL-CCNC: 144 U/L (ref 40–150)
ALP SERPL-CCNC: 161 U/L (ref 40–150)
ALP SERPL-CCNC: 167 U/L (ref 40–150)
ALP SERPL-CCNC: 182 U/L (ref 40–150)
ALP SERPL-CCNC: 94 U/L (ref 40–150)
ALT SERPL W P-5'-P-CCNC: 14 U/L (ref 0–70)
ALT SERPL W P-5'-P-CCNC: 15 U/L (ref 0–70)
ALT SERPL W P-5'-P-CCNC: 17 U/L (ref 0–70)
ALT SERPL W P-5'-P-CCNC: 18 U/L (ref 0–70)
ALT SERPL W P-5'-P-CCNC: 23 U/L (ref 0–70)
ALT SERPL W P-5'-P-CCNC: 24 U/L (ref 0–70)
ALT SERPL W P-5'-P-CCNC: 24 U/L (ref 0–70)
ALT SERPL W P-5'-P-CCNC: 30 U/L (ref 0–70)
ALT SERPL W P-5'-P-CCNC: 50 U/L (ref 0–70)
ALT SERPL W P-5'-P-CCNC: 8 U/L (ref 0–70)
ALT SERPL W P-5'-P-CCNC: <5 U/L (ref 0–70)
AMMONIA PLAS-SCNC: 138 UMOL/L (ref 16–60)
AMMONIA PLAS-SCNC: 78 UMOL/L (ref 16–60)
AMMONIA PLAS-SCNC: 87 UMOL/L (ref 16–60)
AMMONIA PLAS-SCNC: 92 UMOL/L (ref 16–60)
AMMONIA PLAS-SCNC: 97 UMOL/L (ref 16–60)
ANION GAP SERPL CALCULATED.3IONS-SCNC: 10 MMOL/L (ref 7–15)
ANION GAP SERPL CALCULATED.3IONS-SCNC: 11 MMOL/L (ref 7–15)
ANION GAP SERPL CALCULATED.3IONS-SCNC: 12 MMOL/L (ref 7–15)
ANION GAP SERPL CALCULATED.3IONS-SCNC: 13 MMOL/L (ref 7–15)
ANION GAP SERPL CALCULATED.3IONS-SCNC: 14 MMOL/L (ref 7–15)
ANION GAP SERPL CALCULATED.3IONS-SCNC: 14 MMOL/L (ref 7–15)
ANION GAP SERPL CALCULATED.3IONS-SCNC: 15 MMOL/L (ref 7–15)
ANION GAP SERPL CALCULATED.3IONS-SCNC: 16 MMOL/L (ref 7–15)
ANION GAP SERPL CALCULATED.3IONS-SCNC: 9 MMOL/L (ref 7–15)
APPEARANCE FLD: CLEAR
APPEARANCE FLD: CLEAR
APPEARANCE UR: CLEAR
APTT PPP: 35 SECONDS (ref 22–38)
AST SERPL W P-5'-P-CCNC: 121 U/L (ref 0–45)
AST SERPL W P-5'-P-CCNC: 133 U/L (ref 0–45)
AST SERPL W P-5'-P-CCNC: 38 U/L (ref 0–45)
AST SERPL W P-5'-P-CCNC: 42 U/L (ref 0–45)
AST SERPL W P-5'-P-CCNC: 47 U/L (ref 0–45)
AST SERPL W P-5'-P-CCNC: 50 U/L (ref 0–45)
AST SERPL W P-5'-P-CCNC: 53 U/L (ref 0–45)
AST SERPL W P-5'-P-CCNC: 57 U/L (ref 0–45)
AST SERPL W P-5'-P-CCNC: 57 U/L (ref 0–45)
AST SERPL W P-5'-P-CCNC: 62 U/L (ref 0–45)
BACTERIA #/AREA URNS HPF: ABNORMAL /HPF
BACTERIA BLD CULT: NO GROWTH
BACTERIA FLD CULT: NO GROWTH
BACTERIA FLD CULT: NO GROWTH
BASE EXCESS BLDV CALC-SCNC: 2.5 MMOL/L (ref -3–3)
BASOPHILS # BLD AUTO: 0 10E3/UL (ref 0–0.2)
BASOPHILS # BLD AUTO: 0 10E3/UL (ref 0–0.2)
BASOPHILS # BLD MANUAL: 0.1 10E3/UL (ref 0–0.2)
BASOPHILS NFR BLD AUTO: 0 %
BASOPHILS NFR BLD AUTO: 1 %
BASOPHILS NFR BLD MANUAL: 3 %
BILIRUB DIRECT SERPL-MCNC: 0.59 MG/DL (ref 0–0.3)
BILIRUB DIRECT SERPL-MCNC: 1.15 MG/DL (ref 0–0.3)
BILIRUB DIRECT SERPL-MCNC: 1.56 MG/DL (ref 0–0.3)
BILIRUB SERPL-MCNC: 1.1 MG/DL
BILIRUB SERPL-MCNC: 1.4 MG/DL
BILIRUB SERPL-MCNC: 1.4 MG/DL
BILIRUB SERPL-MCNC: 1.6 MG/DL
BILIRUB SERPL-MCNC: 1.7 MG/DL
BILIRUB SERPL-MCNC: 1.8 MG/DL
BILIRUB SERPL-MCNC: 1.8 MG/DL
BILIRUB SERPL-MCNC: 1.9 MG/DL
BILIRUB SERPL-MCNC: 2.2 MG/DL
BILIRUB SERPL-MCNC: 2.4 MG/DL
BILIRUB SERPL-MCNC: 2.4 MG/DL
BILIRUB SERPL-MCNC: 2.9 MG/DL
BILIRUB SERPL-MCNC: 3.4 MG/DL
BILIRUB UR QL STRIP: NEGATIVE
BUN SERPL-MCNC: 10.6 MG/DL (ref 8–23)
BUN SERPL-MCNC: 12.8 MG/DL (ref 8–23)
BUN SERPL-MCNC: 17 MG/DL (ref 8–23)
BUN SERPL-MCNC: 17.5 MG/DL (ref 8–23)
BUN SERPL-MCNC: 20.9 MG/DL (ref 8–23)
BUN SERPL-MCNC: 22.8 MG/DL (ref 8–23)
BUN SERPL-MCNC: 25.1 MG/DL (ref 8–23)
BUN SERPL-MCNC: 29.9 MG/DL (ref 8–23)
BUN SERPL-MCNC: 31.9 MG/DL (ref 8–23)
BUN SERPL-MCNC: 36.4 MG/DL (ref 8–23)
BUN SERPL-MCNC: 38 MG/DL (ref 8–23)
BUN SERPL-MCNC: 42.5 MG/DL (ref 8–23)
BUN SERPL-MCNC: 8.5 MG/DL (ref 8–23)
BUN SERPL-MCNC: 8.9 MG/DL (ref 8–23)
BUN SERPL-MCNC: 9.6 MG/DL (ref 8–23)
CALCIUM SERPL-MCNC: 8.2 MG/DL (ref 8.8–10.4)
CALCIUM SERPL-MCNC: 8.3 MG/DL (ref 8.8–10.4)
CALCIUM SERPL-MCNC: 8.5 MG/DL (ref 8.8–10.4)
CALCIUM SERPL-MCNC: 8.6 MG/DL (ref 8.8–10.4)
CALCIUM SERPL-MCNC: 8.7 MG/DL (ref 8.8–10.4)
CALCIUM SERPL-MCNC: 8.7 MG/DL (ref 8.8–10.4)
CALCIUM SERPL-MCNC: 8.9 MG/DL (ref 8.8–10.4)
CALCIUM SERPL-MCNC: 8.9 MG/DL (ref 8.8–10.4)
CALCIUM SERPL-MCNC: 9.6 MG/DL (ref 8.8–10.2)
CELL COUNT BODY FLUID SOURCE: NORMAL
CELL COUNT BODY FLUID SOURCE: NORMAL
CHLORIDE SERPL-SCNC: 101 MMOL/L (ref 98–107)
CHLORIDE SERPL-SCNC: 85 MMOL/L (ref 98–107)
CHLORIDE SERPL-SCNC: 86 MMOL/L (ref 98–107)
CHLORIDE SERPL-SCNC: 86 MMOL/L (ref 98–107)
CHLORIDE SERPL-SCNC: 87 MMOL/L (ref 98–107)
CHLORIDE SERPL-SCNC: 87 MMOL/L (ref 98–107)
CHLORIDE SERPL-SCNC: 88 MMOL/L (ref 98–107)
CHLORIDE SERPL-SCNC: 89 MMOL/L (ref 98–107)
CHLORIDE SERPL-SCNC: 89 MMOL/L (ref 98–107)
CHLORIDE SERPL-SCNC: 90 MMOL/L (ref 98–107)
CHLORIDE SERPL-SCNC: 92 MMOL/L (ref 98–107)
CHLORIDE SERPL-SCNC: 96 MMOL/L (ref 98–107)
CHLORIDE SERPL-SCNC: 97 MMOL/L (ref 98–107)
CHOLEST SERPL-MCNC: 139 MG/DL
COLOR FLD: YELLOW
COLOR FLD: YELLOW
COLOR UR AUTO: YELLOW
CREAT SERPL-MCNC: 0.84 MG/DL (ref 0.67–1.17)
CREAT SERPL-MCNC: 0.85 MG/DL (ref 0.67–1.17)
CREAT SERPL-MCNC: 0.94 MG/DL (ref 0.67–1.17)
CREAT SERPL-MCNC: 0.95 MG/DL (ref 0.67–1.17)
CREAT SERPL-MCNC: 1.09 MG/DL (ref 0.67–1.17)
CREAT SERPL-MCNC: 1.15 MG/DL (ref 0.67–1.17)
CREAT SERPL-MCNC: 1.2 MG/DL (ref 0.67–1.17)
CREAT SERPL-MCNC: 1.23 MG/DL (ref 0.67–1.17)
CREAT SERPL-MCNC: 1.33 MG/DL (ref 0.67–1.17)
CREAT SERPL-MCNC: 1.39 MG/DL (ref 0.67–1.17)
CREAT SERPL-MCNC: 1.45 MG/DL (ref 0.67–1.17)
CREAT SERPL-MCNC: 1.55 MG/DL (ref 0.67–1.17)
CREAT SERPL-MCNC: 1.58 MG/DL (ref 0.67–1.17)
CREAT SERPL-MCNC: 1.95 MG/DL (ref 0.67–1.17)
CREAT SERPL-MCNC: 2.07 MG/DL (ref 0.67–1.17)
CREAT SERPL-MCNC: 2.08 MG/DL (ref 0.67–1.17)
CREAT UR-MCNC: 109.3 MG/DL
CRP SERPL-MCNC: 14.89 MG/L
DEPRECATED HCO3 PLAS-SCNC: 27 MMOL/L (ref 22–29)
EGFRCR SERPLBLD CKD-EPI 2021: 34 ML/MIN/1.73M2
EGFRCR SERPLBLD CKD-EPI 2021: 35 ML/MIN/1.73M2
EGFRCR SERPLBLD CKD-EPI 2021: 37 ML/MIN/1.73M2
EGFRCR SERPLBLD CKD-EPI 2021: 48 ML/MIN/1.73M2
EGFRCR SERPLBLD CKD-EPI 2021: 49 ML/MIN/1.73M2
EGFRCR SERPLBLD CKD-EPI 2021: 53 ML/MIN/1.73M2
EGFRCR SERPLBLD CKD-EPI 2021: 56 ML/MIN/1.73M2
EGFRCR SERPLBLD CKD-EPI 2021: 59 ML/MIN/1.73M2
EGFRCR SERPLBLD CKD-EPI 2021: 65 ML/MIN/1.73M2
EGFRCR SERPLBLD CKD-EPI 2021: 67 ML/MIN/1.73M2
EGFRCR SERPLBLD CKD-EPI 2021: 70 ML/MIN/1.73M2
EGFRCR SERPLBLD CKD-EPI 2021: 75 ML/MIN/1.73M2
EGFRCR SERPLBLD CKD-EPI 2021: 88 ML/MIN/1.73M2
EGFRCR SERPLBLD CKD-EPI 2021: 89 ML/MIN/1.73M2
EGFRCR SERPLBLD CKD-EPI 2021: >90 ML/MIN/1.73M2
EGFRCR SERPLBLD CKD-EPI 2021: >90 ML/MIN/1.73M2
EOSINOPHIL # BLD AUTO: 0.1 10E3/UL (ref 0–0.7)
EOSINOPHIL # BLD AUTO: 0.5 10E3/UL (ref 0–0.7)
EOSINOPHIL # BLD MANUAL: 0 10E3/UL (ref 0–0.7)
EOSINOPHIL NFR BLD AUTO: 2 %
EOSINOPHIL NFR BLD AUTO: 8 %
EOSINOPHIL NFR BLD MANUAL: 0 %
ERYTHROCYTE [DISTWIDTH] IN BLOOD BY AUTOMATED COUNT: 12.8 % (ref 10–15)
ERYTHROCYTE [DISTWIDTH] IN BLOOD BY AUTOMATED COUNT: 12.8 % (ref 10–15)
ERYTHROCYTE [DISTWIDTH] IN BLOOD BY AUTOMATED COUNT: 12.9 % (ref 10–15)
ERYTHROCYTE [DISTWIDTH] IN BLOOD BY AUTOMATED COUNT: 13 % (ref 10–15)
ERYTHROCYTE [DISTWIDTH] IN BLOOD BY AUTOMATED COUNT: 13.2 % (ref 10–15)
ERYTHROCYTE [DISTWIDTH] IN BLOOD BY AUTOMATED COUNT: 13.2 % (ref 10–15)
ERYTHROCYTE [DISTWIDTH] IN BLOOD BY AUTOMATED COUNT: 13.6 % (ref 10–15)
ERYTHROCYTE [DISTWIDTH] IN BLOOD BY AUTOMATED COUNT: 14.7 % (ref 10–15)
ERYTHROCYTE [DISTWIDTH] IN BLOOD BY AUTOMATED COUNT: 14.9 % (ref 10–15)
ERYTHROCYTE [DISTWIDTH] IN BLOOD BY AUTOMATED COUNT: 14.9 % (ref 10–15)
ERYTHROCYTE [DISTWIDTH] IN BLOOD BY AUTOMATED COUNT: 15.1 % (ref 10–15)
ERYTHROCYTE [DISTWIDTH] IN BLOOD BY AUTOMATED COUNT: 16.1 % (ref 10–15)
ETHANOL SERPL-MCNC: <0.01 G/DL
FASTING STATUS PATIENT QL REPORTED: YES
GLUCOSE BLDC GLUCOMTR-MCNC: 99 MG/DL (ref 70–99)
GLUCOSE SERPL-MCNC: 100 MG/DL (ref 70–99)
GLUCOSE SERPL-MCNC: 102 MG/DL (ref 70–99)
GLUCOSE SERPL-MCNC: 107 MG/DL (ref 70–99)
GLUCOSE SERPL-MCNC: 113 MG/DL (ref 70–99)
GLUCOSE SERPL-MCNC: 120 MG/DL (ref 70–99)
GLUCOSE SERPL-MCNC: 120 MG/DL (ref 70–99)
GLUCOSE SERPL-MCNC: 137 MG/DL (ref 70–99)
GLUCOSE SERPL-MCNC: 139 MG/DL (ref 70–99)
GLUCOSE SERPL-MCNC: 144 MG/DL (ref 70–99)
GLUCOSE SERPL-MCNC: 148 MG/DL (ref 70–99)
GLUCOSE SERPL-MCNC: 150 MG/DL (ref 70–99)
GLUCOSE SERPL-MCNC: 93 MG/DL (ref 70–99)
GLUCOSE SERPL-MCNC: 96 MG/DL (ref 70–99)
GLUCOSE SERPL-MCNC: 97 MG/DL (ref 70–99)
GLUCOSE SERPL-MCNC: 99 MG/DL (ref 70–99)
GLUCOSE UR STRIP-MCNC: NEGATIVE MG/DL
GRAM STAIN RESULT: NORMAL
HCO3 BLDV-SCNC: 27 MMOL/L (ref 21–28)
HCO3 SERPL-SCNC: 19 MMOL/L (ref 22–29)
HCO3 SERPL-SCNC: 22 MMOL/L (ref 22–29)
HCO3 SERPL-SCNC: 23 MMOL/L (ref 22–29)
HCO3 SERPL-SCNC: 23 MMOL/L (ref 22–29)
HCO3 SERPL-SCNC: 24 MMOL/L (ref 22–29)
HCO3 SERPL-SCNC: 24 MMOL/L (ref 22–29)
HCO3 SERPL-SCNC: 25 MMOL/L (ref 22–29)
HCO3 SERPL-SCNC: 26 MMOL/L (ref 22–29)
HCO3 SERPL-SCNC: 26 MMOL/L (ref 22–29)
HCT VFR BLD AUTO: 22.5 % (ref 40–53)
HCT VFR BLD AUTO: 22.6 % (ref 40–53)
HCT VFR BLD AUTO: 22.9 % (ref 40–53)
HCT VFR BLD AUTO: 23.7 % (ref 40–53)
HCT VFR BLD AUTO: 23.9 % (ref 40–53)
HCT VFR BLD AUTO: 24.5 % (ref 40–53)
HCT VFR BLD AUTO: 25.4 % (ref 40–53)
HCT VFR BLD AUTO: 25.5 % (ref 40–53)
HCT VFR BLD AUTO: 26 % (ref 40–53)
HCT VFR BLD AUTO: 26.9 % (ref 40–53)
HCT VFR BLD AUTO: 26.9 % (ref 40–53)
HCT VFR BLD AUTO: 28.5 % (ref 40–53)
HDLC SERPL-MCNC: 48 MG/DL
HGB BLD-MCNC: 10.7 G/DL (ref 13.3–17.7)
HGB BLD-MCNC: 8.2 G/DL (ref 13.3–17.7)
HGB BLD-MCNC: 8.3 G/DL (ref 13.3–17.7)
HGB BLD-MCNC: 8.6 G/DL (ref 13.3–17.7)
HGB BLD-MCNC: 8.8 G/DL (ref 13.3–17.7)
HGB BLD-MCNC: 8.8 G/DL (ref 13.3–17.7)
HGB BLD-MCNC: 9.1 G/DL (ref 13.3–17.7)
HGB BLD-MCNC: 9.2 G/DL (ref 13.3–17.7)
HGB BLD-MCNC: 9.4 G/DL (ref 13.3–17.7)
HGB BLD-MCNC: 9.7 G/DL (ref 13.3–17.7)
HGB BLD-MCNC: 9.7 G/DL (ref 13.3–17.7)
HGB BLD-MCNC: 9.8 G/DL (ref 13.3–17.7)
HGB UR QL STRIP: NEGATIVE
HOLD SPECIMEN: NORMAL
HYALINE CASTS: 1 /LPF
HYALINE CASTS: 7 /LPF
HYALINE CASTS: ABNORMAL
IMM GRANULOCYTES # BLD: 0 10E3/UL
IMM GRANULOCYTES # BLD: 0 10E3/UL
IMM GRANULOCYTES NFR BLD: 0 %
IMM GRANULOCYTES NFR BLD: 1 %
INR PPP: 1.43 (ref 0.85–1.15)
INR PPP: 1.46 (ref 0.85–1.15)
INR PPP: 1.47 (ref 0.85–1.15)
INR PPP: 1.5 (ref 0.85–1.15)
INR PPP: 1.55 (ref 0.85–1.15)
INR PPP: 1.68 (ref 0.85–1.15)
INR PPP: 1.68 (ref 0.85–1.15)
INR PPP: 1.75 (ref 0.85–1.15)
INR PPP: 1.81 (ref 0.85–1.15)
INR PPP: 1.89 (ref 0.85–1.15)
KETONES UR STRIP-MCNC: NEGATIVE MG/DL
LACTATE SERPL-SCNC: 1.2 MMOL/L (ref 0.7–2)
LACTATE SERPL-SCNC: 2.6 MMOL/L (ref 0.7–2)
LACTATE SERPL-SCNC: 4.1 MMOL/L (ref 0.7–2)
LDLC SERPL CALC-MCNC: 76 MG/DL
LEUKOCYTE ESTERASE UR QL STRIP: NEGATIVE
LIPASE SERPL-CCNC: 23 U/L (ref 13–60)
LIPASE SERPL-CCNC: 23 U/L (ref 13–60)
LYMPHOCYTES # BLD AUTO: 1.1 10E3/UL (ref 0.8–5.3)
LYMPHOCYTES # BLD AUTO: 1.1 10E3/UL (ref 0.8–5.3)
LYMPHOCYTES # BLD MANUAL: 0.3 10E3/UL (ref 0.8–5.3)
LYMPHOCYTES NFR BLD AUTO: 18 %
LYMPHOCYTES NFR BLD AUTO: 20 %
LYMPHOCYTES NFR BLD MANUAL: 12 %
LYMPHOCYTES NFR FLD MANUAL: 5 %
LYMPHOCYTES NFR FLD MANUAL: 9 %
MAGNESIUM SERPL-MCNC: 1.2 MG/DL (ref 1.7–2.3)
MAGNESIUM SERPL-MCNC: 1.3 MG/DL (ref 1.7–2.3)
MAGNESIUM SERPL-MCNC: 1.4 MG/DL (ref 1.7–2.3)
MAGNESIUM SERPL-MCNC: 1.5 MG/DL (ref 1.7–2.3)
MAGNESIUM SERPL-MCNC: 1.7 MG/DL (ref 1.7–2.3)
MAGNESIUM SERPL-MCNC: 1.8 MG/DL (ref 1.7–2.3)
MAGNESIUM SERPL-MCNC: 1.8 MG/DL (ref 1.7–2.3)
MAGNESIUM SERPL-MCNC: 1.9 MG/DL (ref 1.7–2.3)
MAGNESIUM SERPL-MCNC: 2 MG/DL (ref 1.7–2.3)
MAGNESIUM SERPL-MCNC: 2.4 MG/DL (ref 1.7–2.3)
MAGNESIUM SERPL-MCNC: 2.5 MG/DL (ref 1.7–2.3)
MAGNESIUM SERPL-MCNC: 2.6 MG/DL (ref 1.7–2.3)
MCH RBC QN AUTO: 32.5 PG (ref 26.5–33)
MCH RBC QN AUTO: 33 PG (ref 26.5–33)
MCH RBC QN AUTO: 33.1 PG (ref 26.5–33)
MCH RBC QN AUTO: 33.3 PG (ref 26.5–33)
MCH RBC QN AUTO: 34.1 PG (ref 26.5–33)
MCH RBC QN AUTO: 34.2 PG (ref 26.5–33)
MCH RBC QN AUTO: 34.4 PG (ref 26.5–33)
MCH RBC QN AUTO: 34.5 PG (ref 26.5–33)
MCH RBC QN AUTO: 34.6 PG (ref 26.5–33)
MCH RBC QN AUTO: 34.7 PG (ref 26.5–33)
MCH RBC QN AUTO: 34.8 PG (ref 26.5–33)
MCH RBC QN AUTO: 34.9 PG (ref 26.5–33)
MCHC RBC AUTO-ENTMCNC: 34.7 G/DL (ref 31.5–36.5)
MCHC RBC AUTO-ENTMCNC: 35.7 G/DL (ref 31.5–36.5)
MCHC RBC AUTO-ENTMCNC: 36.1 G/DL (ref 31.5–36.5)
MCHC RBC AUTO-ENTMCNC: 36.1 G/DL (ref 31.5–36.5)
MCHC RBC AUTO-ENTMCNC: 36.4 G/DL (ref 31.5–36.5)
MCHC RBC AUTO-ENTMCNC: 37 G/DL (ref 31.5–36.5)
MCHC RBC AUTO-ENTMCNC: 37.1 G/DL (ref 31.5–36.5)
MCHC RBC AUTO-ENTMCNC: 37.3 G/DL (ref 31.5–36.5)
MCHC RBC AUTO-ENTMCNC: 37.5 G/DL (ref 31.5–36.5)
MCHC RBC AUTO-ENTMCNC: 37.6 G/DL (ref 31.5–36.5)
MCHC RBC AUTO-ENTMCNC: 37.7 G/DL (ref 31.5–36.5)
MCHC RBC AUTO-ENTMCNC: 38.4 G/DL (ref 31.5–36.5)
MCV RBC AUTO: 91 FL (ref 78–100)
MCV RBC AUTO: 92 FL (ref 78–100)
MCV RBC AUTO: 93 FL (ref 78–100)
MCV RBC AUTO: 94 FL (ref 78–100)
MCV RBC AUTO: 94 FL (ref 78–100)
METAMYELOCYTES # BLD MANUAL: 0.1 10E3/UL
METAMYELOCYTES NFR BLD MANUAL: 2 %
MICROALBUMIN UR-MCNC: <12 MG/L
MICROALBUMIN/CREAT UR: NORMAL MG/G{CREAT}
MONOCYTES # BLD AUTO: 0.6 10E3/UL (ref 0–1.3)
MONOCYTES # BLD AUTO: 0.7 10E3/UL (ref 0–1.3)
MONOCYTES # BLD MANUAL: 0.6 10E3/UL (ref 0–1.3)
MONOCYTES NFR BLD AUTO: 12 %
MONOCYTES NFR BLD AUTO: 12 %
MONOCYTES NFR BLD MANUAL: 23 %
MONOS+MACROS NFR FLD MANUAL: 81 %
MONOS+MACROS NFR FLD MANUAL: 84 %
MUCOUS THREADS #/AREA URNS LPF: PRESENT /LPF
NEUTROPHILS # BLD AUTO: 3.3 10E3/UL (ref 1.6–8.3)
NEUTROPHILS # BLD AUTO: 4 10E3/UL (ref 1.6–8.3)
NEUTROPHILS # BLD MANUAL: 1.6 10E3/UL (ref 1.6–8.3)
NEUTROPHILS NFR BLD AUTO: 60 %
NEUTROPHILS NFR BLD AUTO: 67 %
NEUTROPHILS NFR BLD MANUAL: 60 %
NEUTS BAND NFR FLD MANUAL: 5 %
NEUTS BAND NFR FLD MANUAL: 6 %
NITRATE UR QL: NEGATIVE
NONHDLC SERPL-MCNC: 91 MG/DL
NRBC # BLD AUTO: 0 10E3/UL
NRBC BLD AUTO-RTO: 0 /100
NRBC BLD MANUAL-RTO: 1 %
NT-PROBNP SERPL-MCNC: 1336 PG/ML (ref 0–900)
NT-PROBNP SERPL-MCNC: 603 PG/ML (ref 0–900)
O2/TOTAL GAS SETTING VFR VENT: 21 %
OSMOLALITY UR: 235 MMOL/KG (ref 100–1200)
OTHER CELLS FLD MANUAL: 1 %
OXYHGB MFR BLDV: 35 % (ref 70–75)
PATH REV: NORMAL
PCO2 BLDV: 41 MM HG (ref 40–50)
PH BLDV: 7.43 [PH] (ref 7.32–7.43)
PH UR STRIP: 5 [PH] (ref 5–7)
PH UR STRIP: 5 [PH] (ref 5–7)
PH UR STRIP: 7 [PH] (ref 5–7)
PHOSPHATE SERPL-MCNC: 2.1 MG/DL (ref 2.5–4.5)
PHOSPHATE SERPL-MCNC: 2.4 MG/DL (ref 2.5–4.5)
PHOSPHATE SERPL-MCNC: 2.9 MG/DL (ref 2.5–4.5)
PHOSPHATE SERPL-MCNC: 3.3 MG/DL (ref 2.5–4.5)
PLAT MORPH BLD: ABNORMAL
PLAT MORPH BLD: NORMAL
PLATELET # BLD AUTO: 103 10E3/UL (ref 150–450)
PLATELET # BLD AUTO: 108 10E3/UL (ref 150–450)
PLATELET # BLD AUTO: 109 10E3/UL (ref 150–450)
PLATELET # BLD AUTO: 118 10E3/UL (ref 150–450)
PLATELET # BLD AUTO: 63 10E3/UL (ref 150–450)
PLATELET # BLD AUTO: 70 10E3/UL (ref 150–450)
PLATELET # BLD AUTO: 76 10E3/UL (ref 150–450)
PLATELET # BLD AUTO: 77 10E3/UL (ref 150–450)
PLATELET # BLD AUTO: 78 10E3/UL (ref 150–450)
PLATELET # BLD AUTO: 82 10E3/UL (ref 150–450)
PLATELET # BLD AUTO: 91 10E3/UL (ref 150–450)
PLATELET # BLD AUTO: 96 10E3/UL (ref 150–450)
PO2 BLDV: 25 MM HG (ref 25–47)
POTASSIUM SERPL-SCNC: 3.5 MMOL/L (ref 3.4–5.3)
POTASSIUM SERPL-SCNC: 3.6 MMOL/L (ref 3.4–5.3)
POTASSIUM SERPL-SCNC: 3.7 MMOL/L (ref 3.4–5.3)
POTASSIUM SERPL-SCNC: 3.8 MMOL/L (ref 3.4–5.3)
POTASSIUM SERPL-SCNC: 3.8 MMOL/L (ref 3.4–5.3)
POTASSIUM SERPL-SCNC: 3.9 MMOL/L (ref 3.4–5.3)
POTASSIUM SERPL-SCNC: 4 MMOL/L (ref 3.4–5.3)
POTASSIUM SERPL-SCNC: 4.1 MMOL/L (ref 3.4–5.3)
POTASSIUM SERPL-SCNC: 4.2 MMOL/L (ref 3.4–5.3)
POTASSIUM SERPL-SCNC: 4.8 MMOL/L (ref 3.4–5.3)
POTASSIUM SERPL-SCNC: 4.9 MMOL/L (ref 3.4–5.3)
POTASSIUM SERPL-SCNC: 4.9 MMOL/L (ref 3.4–5.3)
PROT FLD-MCNC: 1.3 G/DL
PROT SERPL-MCNC: 5.3 G/DL (ref 6.4–8.3)
PROT SERPL-MCNC: 5.5 G/DL (ref 6.4–8.3)
PROT SERPL-MCNC: 5.5 G/DL (ref 6.4–8.3)
PROT SERPL-MCNC: 5.7 G/DL (ref 6.4–8.3)
PROT SERPL-MCNC: 5.7 G/DL (ref 6.4–8.3)
PROT SERPL-MCNC: 5.8 G/DL (ref 6.4–8.3)
PROT SERPL-MCNC: 5.9 G/DL (ref 6.4–8.3)
PROT SERPL-MCNC: 5.9 G/DL (ref 6.4–8.3)
PROT SERPL-MCNC: 6.1 G/DL (ref 6.4–8.3)
PROT SERPL-MCNC: 6.4 G/DL (ref 6.4–8.3)
PROT SERPL-MCNC: 7 G/DL (ref 6.4–8.3)
PROT SERPL-MCNC: 7 G/DL (ref 6.4–8.3)
PROT SERPL-MCNC: 8.1 G/DL (ref 6.4–8.3)
PROTEIN BODY FLUID SOURCE: NORMAL
PSA SERPL DL<=0.01 NG/ML-MCNC: 0.81 NG/ML (ref 0–4.5)
RBC # BLD AUTO: 2.4 10E6/UL (ref 4.4–5.9)
RBC # BLD AUTO: 2.48 10E6/UL (ref 4.4–5.9)
RBC # BLD AUTO: 2.53 10E6/UL (ref 4.4–5.9)
RBC # BLD AUTO: 2.55 10E6/UL (ref 4.4–5.9)
RBC # BLD AUTO: 2.56 10E6/UL (ref 4.4–5.9)
RBC # BLD AUTO: 2.7 10E6/UL (ref 4.4–5.9)
RBC # BLD AUTO: 2.72 10E6/UL (ref 4.4–5.9)
RBC # BLD AUTO: 2.75 10E6/UL (ref 4.4–5.9)
RBC # BLD AUTO: 2.84 10E6/UL (ref 4.4–5.9)
RBC # BLD AUTO: 2.91 10E6/UL (ref 4.4–5.9)
RBC # BLD AUTO: 2.94 10E6/UL (ref 4.4–5.9)
RBC # BLD AUTO: 3.07 10E6/UL (ref 4.4–5.9)
RBC MORPH BLD: ABNORMAL
RBC MORPH BLD: NORMAL
RBC URINE: 0 /HPF
RBC URINE: 0 /HPF
RBC URINE: ABNORMAL
SAO2 % BLDV: 35.4 % (ref 70–75)
SODIUM SERPL-SCNC: 116 MMOL/L (ref 135–145)
SODIUM SERPL-SCNC: 116 MMOL/L (ref 135–145)
SODIUM SERPL-SCNC: 117 MMOL/L (ref 135–145)
SODIUM SERPL-SCNC: 117 MMOL/L (ref 135–145)
SODIUM SERPL-SCNC: 118 MMOL/L (ref 135–145)
SODIUM SERPL-SCNC: 119 MMOL/L (ref 135–145)
SODIUM SERPL-SCNC: 120 MMOL/L (ref 135–145)
SODIUM SERPL-SCNC: 121 MMOL/L (ref 135–145)
SODIUM SERPL-SCNC: 121 MMOL/L (ref 135–145)
SODIUM SERPL-SCNC: 122 MMOL/L (ref 135–145)
SODIUM SERPL-SCNC: 123 MMOL/L (ref 135–145)
SODIUM SERPL-SCNC: 124 MMOL/L (ref 135–145)
SODIUM SERPL-SCNC: 125 MMOL/L (ref 135–145)
SODIUM SERPL-SCNC: 126 MMOL/L (ref 135–145)
SODIUM SERPL-SCNC: 127 MMOL/L (ref 135–145)
SODIUM SERPL-SCNC: 128 MMOL/L (ref 135–145)
SODIUM SERPL-SCNC: 129 MMOL/L (ref 135–145)
SODIUM SERPL-SCNC: 132 MMOL/L (ref 135–145)
SODIUM SERPL-SCNC: 132 MMOL/L (ref 135–145)
SODIUM SERPL-SCNC: 133 MMOL/L (ref 135–145)
SODIUM SERPL-SCNC: 136 MMOL/L (ref 135–145)
SODIUM UR-SCNC: 20 MMOL/L
SODIUM UR-SCNC: 21 MMOL/L
SP GR UR STRIP: 1.01 (ref 1–1.03)
SP GR UR STRIP: 1.02 (ref 1–1.03)
SP GR UR STRIP: 1.02 (ref 1–1.03)
SQUAMOUS EPITHELIAL: <1 /HPF
SQUAMOUS EPITHELIAL: ABNORMAL
TRIGL SERPL-MCNC: 73 MG/DL
UROBILINOGEN UR STRIP-MCNC: 4 MG/DL
UROBILINOGEN UR STRIP-MCNC: NORMAL MG/DL
UROBILINOGEN UR STRIP-MCNC: NORMAL MG/DL
WBC # BLD AUTO: 11.7 10E3/UL (ref 4–11)
WBC # BLD AUTO: 2.7 10E3/UL (ref 4–11)
WBC # BLD AUTO: 4.9 10E3/UL (ref 4–11)
WBC # BLD AUTO: 5 10E3/UL (ref 4–11)
WBC # BLD AUTO: 5 10E3/UL (ref 4–11)
WBC # BLD AUTO: 5.1 10E3/UL (ref 4–11)
WBC # BLD AUTO: 5.4 10E3/UL (ref 4–11)
WBC # BLD AUTO: 5.4 10E3/UL (ref 4–11)
WBC # BLD AUTO: 5.5 10E3/UL (ref 4–11)
WBC # BLD AUTO: 5.5 10E3/UL (ref 4–11)
WBC # BLD AUTO: 6 10E3/UL (ref 4–11)
WBC # BLD AUTO: 6 10E3/UL (ref 4–11)
WBC # FLD AUTO: 88 /UL
WBC # FLD AUTO: 99 /UL
WBC URINE: 0 /HPF
WBC URINE: 3 /HPF
WBC URINE: ABNORMAL

## 2024-01-01 PROCEDURE — 250N000013 HC RX MED GY IP 250 OP 250 PS 637: Performed by: HOSPITALIST

## 2024-01-01 PROCEDURE — 96375 TX/PRO/DX INJ NEW DRUG ADDON: CPT | Performed by: EMERGENCY MEDICINE

## 2024-01-01 PROCEDURE — 87205 SMEAR GRAM STAIN: CPT | Performed by: FAMILY MEDICINE

## 2024-01-01 PROCEDURE — 99232 SBSQ HOSP IP/OBS MODERATE 35: CPT | Performed by: INTERNAL MEDICINE

## 2024-01-01 PROCEDURE — 71045 X-RAY EXAM CHEST 1 VIEW: CPT

## 2024-01-01 PROCEDURE — 99223 1ST HOSP IP/OBS HIGH 75: CPT | Mod: AI | Performed by: INTERNAL MEDICINE

## 2024-01-01 PROCEDURE — 36415 COLL VENOUS BLD VENIPUNCTURE: CPT | Performed by: INTERNAL MEDICINE

## 2024-01-01 PROCEDURE — 82077 ASSAY SPEC XCP UR&BREATH IA: CPT | Performed by: PHYSICIAN ASSISTANT

## 2024-01-01 PROCEDURE — 250N000011 HC RX IP 250 OP 636: Performed by: FAMILY MEDICINE

## 2024-01-01 PROCEDURE — 36415 COLL VENOUS BLD VENIPUNCTURE: CPT | Performed by: HOSPITALIST

## 2024-01-01 PROCEDURE — 250N000011 HC RX IP 250 OP 636: Performed by: INTERNAL MEDICINE

## 2024-01-01 PROCEDURE — 82077 ASSAY SPEC XCP UR&BREATH IA: CPT | Performed by: EMERGENCY MEDICINE

## 2024-01-01 PROCEDURE — 99232 SBSQ HOSP IP/OBS MODERATE 35: CPT | Performed by: HOSPITALIST

## 2024-01-01 PROCEDURE — 250N000013 HC RX MED GY IP 250 OP 250 PS 637: Performed by: INTERNAL MEDICINE

## 2024-01-01 PROCEDURE — 83605 ASSAY OF LACTIC ACID: CPT | Performed by: HOSPITALIST

## 2024-01-01 PROCEDURE — 71046 X-RAY EXAM CHEST 2 VIEWS: CPT

## 2024-01-01 PROCEDURE — 258N000003 HC RX IP 258 OP 636: Performed by: PHYSICIAN ASSISTANT

## 2024-01-01 PROCEDURE — 36415 COLL VENOUS BLD VENIPUNCTURE: CPT | Performed by: FAMILY MEDICINE

## 2024-01-01 PROCEDURE — 85027 COMPLETE CBC AUTOMATED: CPT | Performed by: FAMILY MEDICINE

## 2024-01-01 PROCEDURE — 85610 PROTHROMBIN TIME: CPT | Performed by: INTERNAL MEDICINE

## 2024-01-01 PROCEDURE — 96376 TX/PRO/DX INJ SAME DRUG ADON: CPT

## 2024-01-01 PROCEDURE — 96365 THER/PROPH/DIAG IV INF INIT: CPT | Performed by: EMERGENCY MEDICINE

## 2024-01-01 PROCEDURE — 84295 ASSAY OF SERUM SODIUM: CPT | Performed by: FAMILY MEDICINE

## 2024-01-01 PROCEDURE — 99285 EMERGENCY DEPT VISIT HI MDM: CPT | Mod: 25 | Performed by: EMERGENCY MEDICINE

## 2024-01-01 PROCEDURE — 97116 GAIT TRAINING THERAPY: CPT | Mod: GP

## 2024-01-01 PROCEDURE — 80053 COMPREHEN METABOLIC PANEL: CPT | Performed by: FAMILY MEDICINE

## 2024-01-01 PROCEDURE — 97140 MANUAL THERAPY 1/> REGIONS: CPT | Mod: GP

## 2024-01-01 PROCEDURE — 85027 COMPLETE CBC AUTOMATED: CPT | Performed by: INTERNAL MEDICINE

## 2024-01-01 PROCEDURE — 82042 OTHER SOURCE ALBUMIN QUAN EA: CPT | Performed by: FAMILY MEDICINE

## 2024-01-01 PROCEDURE — 250N000011 HC RX IP 250 OP 636: Mod: JB | Performed by: INTERNAL MEDICINE

## 2024-01-01 PROCEDURE — 82570 ASSAY OF URINE CREATININE: CPT | Performed by: INTERNAL MEDICINE

## 2024-01-01 PROCEDURE — 83735 ASSAY OF MAGNESIUM: CPT | Performed by: FAMILY MEDICINE

## 2024-01-01 PROCEDURE — 85610 PROTHROMBIN TIME: CPT | Performed by: FAMILY MEDICINE

## 2024-01-01 PROCEDURE — 99496 TRANSJ CARE MGMT HIGH F2F 7D: CPT | Performed by: INTERNAL MEDICINE

## 2024-01-01 PROCEDURE — 83605 ASSAY OF LACTIC ACID: CPT | Performed by: EMERGENCY MEDICINE

## 2024-01-01 PROCEDURE — 76770 US EXAM ABDO BACK WALL COMP: CPT

## 2024-01-01 PROCEDURE — 80053 COMPREHEN METABOLIC PANEL: CPT | Performed by: HOSPITALIST

## 2024-01-01 PROCEDURE — 71271 CT THORAX LUNG CANCER SCR C-: CPT

## 2024-01-01 PROCEDURE — 94640 AIRWAY INHALATION TREATMENT: CPT

## 2024-01-01 PROCEDURE — 83735 ASSAY OF MAGNESIUM: CPT | Performed by: HOSPITALIST

## 2024-01-01 PROCEDURE — 80053 COMPREHEN METABOLIC PANEL: CPT | Performed by: INTERNAL MEDICINE

## 2024-01-01 PROCEDURE — 83735 ASSAY OF MAGNESIUM: CPT | Performed by: INTERNAL MEDICINE

## 2024-01-01 PROCEDURE — 250N000009 HC RX 250: Performed by: RADIOLOGY

## 2024-01-01 PROCEDURE — 99285 EMERGENCY DEPT VISIT HI MDM: CPT | Performed by: EMERGENCY MEDICINE

## 2024-01-01 PROCEDURE — 84155 ASSAY OF PROTEIN SERUM: CPT | Performed by: EMERGENCY MEDICINE

## 2024-01-01 PROCEDURE — 84300 ASSAY OF URINE SODIUM: CPT | Performed by: FAMILY MEDICINE

## 2024-01-01 PROCEDURE — 120N000001 HC R&B MED SURG/OB

## 2024-01-01 PROCEDURE — 97161 PT EVAL LOW COMPLEX 20 MIN: CPT | Mod: GP

## 2024-01-01 PROCEDURE — 85025 COMPLETE CBC W/AUTO DIFF WBC: CPT | Performed by: HOSPITALIST

## 2024-01-01 PROCEDURE — 96366 THER/PROPH/DIAG IV INF ADDON: CPT | Performed by: EMERGENCY MEDICINE

## 2024-01-01 PROCEDURE — 80048 BASIC METABOLIC PNL TOTAL CA: CPT | Performed by: HOSPITALIST

## 2024-01-01 PROCEDURE — 83880 ASSAY OF NATRIURETIC PEPTIDE: CPT | Performed by: PHYSICIAN ASSISTANT

## 2024-01-01 PROCEDURE — 82247 BILIRUBIN TOTAL: CPT | Performed by: FAMILY MEDICINE

## 2024-01-01 PROCEDURE — 85014 HEMATOCRIT: CPT | Performed by: EMERGENCY MEDICINE

## 2024-01-01 PROCEDURE — 81001 URINALYSIS AUTO W/SCOPE: CPT | Performed by: INTERNAL MEDICINE

## 2024-01-01 PROCEDURE — P9047 ALBUMIN (HUMAN), 25%, 50ML: HCPCS | Performed by: INTERNAL MEDICINE

## 2024-01-01 PROCEDURE — 84100 ASSAY OF PHOSPHORUS: CPT | Performed by: INTERNAL MEDICINE

## 2024-01-01 PROCEDURE — 84295 ASSAY OF SERUM SODIUM: CPT | Performed by: INTERNAL MEDICINE

## 2024-01-01 PROCEDURE — G0103 PSA SCREENING: HCPCS | Performed by: INTERNAL MEDICINE

## 2024-01-01 PROCEDURE — 84100 ASSAY OF PHOSPHORUS: CPT | Performed by: PHYSICIAN ASSISTANT

## 2024-01-01 PROCEDURE — 250N000011 HC RX IP 250 OP 636: Performed by: EMERGENCY MEDICINE

## 2024-01-01 PROCEDURE — 99239 HOSP IP/OBS DSCHRG MGMT >30: CPT | Performed by: HOSPITALIST

## 2024-01-01 PROCEDURE — 80061 LIPID PANEL: CPT | Performed by: INTERNAL MEDICINE

## 2024-01-01 PROCEDURE — 99222 1ST HOSP IP/OBS MODERATE 55: CPT | Mod: AI | Performed by: FAMILY MEDICINE

## 2024-01-01 PROCEDURE — 83880 ASSAY OF NATRIURETIC PEPTIDE: CPT

## 2024-01-01 PROCEDURE — 99222 1ST HOSP IP/OBS MODERATE 55: CPT | Performed by: INTERNAL MEDICINE

## 2024-01-01 PROCEDURE — 250N000011 HC RX IP 250 OP 636: Performed by: PHYSICIAN ASSISTANT

## 2024-01-01 PROCEDURE — 85730 THROMBOPLASTIN TIME PARTIAL: CPT | Performed by: PHYSICIAN ASSISTANT

## 2024-01-01 PROCEDURE — 80053 COMPREHEN METABOLIC PANEL: CPT

## 2024-01-01 PROCEDURE — 36415 COLL VENOUS BLD VENIPUNCTURE: CPT | Performed by: PHYSICIAN ASSISTANT

## 2024-01-01 PROCEDURE — 85025 COMPLETE CBC W/AUTO DIFF WBC: CPT | Performed by: PHYSICIAN ASSISTANT

## 2024-01-01 PROCEDURE — 85007 BL SMEAR W/DIFF WBC COUNT: CPT | Performed by: EMERGENCY MEDICINE

## 2024-01-01 PROCEDURE — 99221 1ST HOSP IP/OBS SF/LOW 40: CPT | Mod: FS | Performed by: STUDENT IN AN ORGANIZED HEALTH CARE EDUCATION/TRAINING PROGRAM

## 2024-01-01 PROCEDURE — 99214 OFFICE O/P EST MOD 30 MIN: CPT

## 2024-01-01 PROCEDURE — 272N000706 US PARACENTESIS WITH ALBUMIN

## 2024-01-01 PROCEDURE — G0438 PPPS, INITIAL VISIT: HCPCS | Performed by: INTERNAL MEDICINE

## 2024-01-01 PROCEDURE — 49083 ABD PARACENTESIS W/IMAGING: CPT

## 2024-01-01 PROCEDURE — 0W9G3ZZ DRAINAGE OF PERITONEAL CAVITY, PERCUTANEOUS APPROACH: ICD-10-PCS | Performed by: RADIOLOGY

## 2024-01-01 PROCEDURE — 83735 ASSAY OF MAGNESIUM: CPT | Performed by: EMERGENCY MEDICINE

## 2024-01-01 PROCEDURE — 84157 ASSAY OF PROTEIN OTHER: CPT | Performed by: FAMILY MEDICINE

## 2024-01-01 PROCEDURE — 82310 ASSAY OF CALCIUM: CPT | Performed by: FAMILY MEDICINE

## 2024-01-01 PROCEDURE — G0378 HOSPITAL OBSERVATION PER HR: HCPCS

## 2024-01-01 PROCEDURE — 82248 BILIRUBIN DIRECT: CPT

## 2024-01-01 PROCEDURE — 250N000009 HC RX 250: Performed by: FAMILY MEDICINE

## 2024-01-01 PROCEDURE — 99233 SBSQ HOSP IP/OBS HIGH 50: CPT | Performed by: FAMILY MEDICINE

## 2024-01-01 PROCEDURE — 82962 GLUCOSE BLOOD TEST: CPT

## 2024-01-01 PROCEDURE — 83735 ASSAY OF MAGNESIUM: CPT

## 2024-01-01 PROCEDURE — G0296 VISIT TO DETERM LDCT ELIG: HCPCS | Performed by: INTERNAL MEDICINE

## 2024-01-01 PROCEDURE — P9047 ALBUMIN (HUMAN), 25%, 50ML: HCPCS | Mod: JZ | Performed by: INTERNAL MEDICINE

## 2024-01-01 PROCEDURE — 250N000009 HC RX 250: Performed by: INTERNAL MEDICINE

## 2024-01-01 PROCEDURE — 96365 THER/PROPH/DIAG IV INF INIT: CPT | Performed by: STUDENT IN AN ORGANIZED HEALTH CARE EDUCATION/TRAINING PROGRAM

## 2024-01-01 PROCEDURE — 83935 ASSAY OF URINE OSMOLALITY: CPT | Performed by: FAMILY MEDICINE

## 2024-01-01 PROCEDURE — 83690 ASSAY OF LIPASE: CPT | Performed by: EMERGENCY MEDICINE

## 2024-01-01 PROCEDURE — 89050 BODY FLUID CELL COUNT: CPT | Performed by: FAMILY MEDICINE

## 2024-01-01 PROCEDURE — 82140 ASSAY OF AMMONIA: CPT | Performed by: PHYSICIAN ASSISTANT

## 2024-01-01 PROCEDURE — 76705 ECHO EXAM OF ABDOMEN: CPT

## 2024-01-01 PROCEDURE — 85610 PROTHROMBIN TIME: CPT | Performed by: EMERGENCY MEDICINE

## 2024-01-01 PROCEDURE — 99285 EMERGENCY DEPT VISIT HI MDM: CPT | Mod: 25 | Performed by: STUDENT IN AN ORGANIZED HEALTH CARE EDUCATION/TRAINING PROGRAM

## 2024-01-01 PROCEDURE — 82374 ASSAY BLOOD CARBON DIOXIDE: CPT | Performed by: EMERGENCY MEDICINE

## 2024-01-01 PROCEDURE — 87070 CULTURE OTHR SPECIMN AEROBIC: CPT | Performed by: INTERNAL MEDICINE

## 2024-01-01 PROCEDURE — 82043 UR ALBUMIN QUANTITATIVE: CPT | Performed by: INTERNAL MEDICINE

## 2024-01-01 PROCEDURE — 80076 HEPATIC FUNCTION PANEL: CPT | Performed by: PHYSICIAN ASSISTANT

## 2024-01-01 PROCEDURE — 81003 URINALYSIS AUTO W/O SCOPE: CPT | Performed by: EMERGENCY MEDICINE

## 2024-01-01 PROCEDURE — 87040 BLOOD CULTURE FOR BACTERIA: CPT | Performed by: PHYSICIAN ASSISTANT

## 2024-01-01 PROCEDURE — 81001 URINALYSIS AUTO W/SCOPE: CPT | Performed by: EMERGENCY MEDICINE

## 2024-01-01 PROCEDURE — 36415 COLL VENOUS BLD VENIPUNCTURE: CPT | Performed by: EMERGENCY MEDICINE

## 2024-01-01 PROCEDURE — 258N000003 HC RX IP 258 OP 636: Performed by: INTERNAL MEDICINE

## 2024-01-01 PROCEDURE — 99285 EMERGENCY DEPT VISIT HI MDM: CPT | Mod: FS | Performed by: STUDENT IN AN ORGANIZED HEALTH CARE EDUCATION/TRAINING PROGRAM

## 2024-01-01 PROCEDURE — 250N000013 HC RX MED GY IP 250 OP 250 PS 637: Performed by: FAMILY MEDICINE

## 2024-01-01 PROCEDURE — 82040 ASSAY OF SERUM ALBUMIN: CPT | Performed by: INTERNAL MEDICINE

## 2024-01-01 PROCEDURE — 250N000013 HC RX MED GY IP 250 OP 250 PS 637: Performed by: PHYSICIAN ASSISTANT

## 2024-01-01 PROCEDURE — 250N000011 HC RX IP 250 OP 636: Performed by: HOSPITALIST

## 2024-01-01 PROCEDURE — 97530 THERAPEUTIC ACTIVITIES: CPT | Mod: GP

## 2024-01-01 PROCEDURE — 99214 OFFICE O/P EST MOD 30 MIN: CPT | Mod: 25 | Performed by: INTERNAL MEDICINE

## 2024-01-01 PROCEDURE — 99283 EMERGENCY DEPT VISIT LOW MDM: CPT | Performed by: EMERGENCY MEDICINE

## 2024-01-01 PROCEDURE — 93005 ELECTROCARDIOGRAM TRACING: CPT

## 2024-01-01 PROCEDURE — 82140 ASSAY OF AMMONIA: CPT | Performed by: INTERNAL MEDICINE

## 2024-01-01 PROCEDURE — 82140 ASSAY OF AMMONIA: CPT | Performed by: EMERGENCY MEDICINE

## 2024-01-01 PROCEDURE — 99233 SBSQ HOSP IP/OBS HIGH 50: CPT | Performed by: INTERNAL MEDICINE

## 2024-01-01 PROCEDURE — 99214 OFFICE O/P EST MOD 30 MIN: CPT | Performed by: INTERNAL MEDICINE

## 2024-01-01 PROCEDURE — 83690 ASSAY OF LIPASE: CPT | Performed by: PHYSICIAN ASSISTANT

## 2024-01-01 PROCEDURE — 99284 EMERGENCY DEPT VISIT MOD MDM: CPT | Mod: 25 | Performed by: EMERGENCY MEDICINE

## 2024-01-01 PROCEDURE — 82042 OTHER SOURCE ALBUMIN QUAN EA: CPT | Performed by: HOSPITALIST

## 2024-01-01 PROCEDURE — 85027 COMPLETE CBC AUTOMATED: CPT

## 2024-01-01 PROCEDURE — 82565 ASSAY OF CREATININE: CPT | Performed by: FAMILY MEDICINE

## 2024-01-01 PROCEDURE — 36415 COLL VENOUS BLD VENIPUNCTURE: CPT

## 2024-01-01 PROCEDURE — 0W9G3ZZ DRAINAGE OF PERITONEAL CAVITY, PERCUTANEOUS APPROACH: ICD-10-PCS | Performed by: INTERNAL MEDICINE

## 2024-01-01 PROCEDURE — 83735 ASSAY OF MAGNESIUM: CPT | Performed by: PHYSICIAN ASSISTANT

## 2024-01-01 PROCEDURE — 99239 HOSP IP/OBS DSCHRG MGMT >30: CPT | Performed by: FAMILY MEDICINE

## 2024-01-01 PROCEDURE — 82805 BLOOD GASES W/O2 SATURATION: CPT | Performed by: PHYSICIAN ASSISTANT

## 2024-01-01 PROCEDURE — 82248 BILIRUBIN DIRECT: CPT | Performed by: PHYSICIAN ASSISTANT

## 2024-01-01 PROCEDURE — 97165 OT EVAL LOW COMPLEX 30 MIN: CPT | Mod: GO

## 2024-01-01 PROCEDURE — G2211 COMPLEX E/M VISIT ADD ON: HCPCS | Performed by: INTERNAL MEDICINE

## 2024-01-01 PROCEDURE — 86140 C-REACTIVE PROTEIN: CPT | Performed by: PHYSICIAN ASSISTANT

## 2024-01-01 PROCEDURE — 84300 ASSAY OF URINE SODIUM: CPT | Performed by: INTERNAL MEDICINE

## 2024-01-01 PROCEDURE — 85014 HEMATOCRIT: CPT | Performed by: INTERNAL MEDICINE

## 2024-01-01 PROCEDURE — 85610 PROTHROMBIN TIME: CPT | Performed by: PHYSICIAN ASSISTANT

## 2024-01-01 PROCEDURE — 258N000003 HC RX IP 258 OP 636: Performed by: EMERGENCY MEDICINE

## 2024-01-01 PROCEDURE — 89050 BODY FLUID CELL COUNT: CPT | Performed by: INTERNAL MEDICINE

## 2024-01-01 RX ORDER — POTASSIUM CHLORIDE 1500 MG/1
20 TABLET, EXTENDED RELEASE ORAL ONCE
Status: COMPLETED | OUTPATIENT
Start: 2024-01-01 | End: 2024-01-01

## 2024-01-01 RX ORDER — IPRATROPIUM BROMIDE AND ALBUTEROL SULFATE 2.5; .5 MG/3ML; MG/3ML
3 SOLUTION RESPIRATORY (INHALATION)
Status: DISCONTINUED | OUTPATIENT
Start: 2024-01-01 | End: 2024-01-01

## 2024-01-01 RX ORDER — MIDODRINE HYDROCHLORIDE 2.5 MG/1
10 TABLET ORAL ONCE
Status: COMPLETED | OUTPATIENT
Start: 2024-01-01 | End: 2024-01-01

## 2024-01-01 RX ORDER — MAGNESIUM SULFATE HEPTAHYDRATE 40 MG/ML
2 INJECTION, SOLUTION INTRAVENOUS ONCE
Status: DISCONTINUED | OUTPATIENT
Start: 2024-01-01 | End: 2024-01-01

## 2024-01-01 RX ORDER — LIDOCAINE HYDROCHLORIDE 10 MG/ML
10 INJECTION, SOLUTION EPIDURAL; INFILTRATION; INTRACAUDAL; PERINEURAL ONCE
Status: COMPLETED | OUTPATIENT
Start: 2024-01-01 | End: 2024-01-01

## 2024-01-01 RX ORDER — MAGNESIUM SULFATE HEPTAHYDRATE 40 MG/ML
4 INJECTION, SOLUTION INTRAVENOUS ONCE
Status: COMPLETED | OUTPATIENT
Start: 2024-01-01 | End: 2024-01-01

## 2024-01-01 RX ORDER — ALBUMIN (HUMAN) 12.5 G/50ML
25-50 SOLUTION INTRAVENOUS ONCE
Status: DISCONTINUED | OUTPATIENT
Start: 2024-01-01 | End: 2024-01-01

## 2024-01-01 RX ORDER — CEFAZOLIN SODIUM/WATER 2 G/20 ML
2 SYRINGE (ML) INTRAVENOUS EVERY 8 HOURS
Status: DISCONTINUED | OUTPATIENT
Start: 2024-01-01 | End: 2024-01-01 | Stop reason: ALTCHOICE

## 2024-01-01 RX ORDER — LIDOCAINE 40 MG/G
CREAM TOPICAL
Status: DISCONTINUED | OUTPATIENT
Start: 2024-01-01 | End: 2024-01-01 | Stop reason: HOSPADM

## 2024-01-01 RX ORDER — MIDODRINE HYDROCHLORIDE 5 MG/1
5 TABLET ORAL 3 TIMES DAILY
Qty: 90 TABLET | Refills: 0 | Status: SHIPPED | OUTPATIENT
Start: 2024-01-01 | End: 2024-01-01

## 2024-01-01 RX ORDER — MAGNESIUM OXIDE 400 MG/1
400 TABLET ORAL 2 TIMES DAILY
Qty: 100 TABLET | Refills: 3 | Status: SHIPPED | OUTPATIENT
Start: 2024-01-01

## 2024-01-01 RX ORDER — SODIUM CHLORIDE 1 G/1
1 TABLET ORAL 2 TIMES DAILY WITH MEALS
Status: COMPLETED | OUTPATIENT
Start: 2024-01-01 | End: 2024-01-01

## 2024-01-01 RX ORDER — FOLIC ACID 1 MG/1
1 TABLET ORAL DAILY
Status: DISCONTINUED | OUTPATIENT
Start: 2024-01-01 | End: 2024-01-01 | Stop reason: HOSPADM

## 2024-01-01 RX ORDER — OMEPRAZOLE 40 MG/1
CAPSULE, DELAYED RELEASE ORAL
Qty: 90 CAPSULE | Refills: 0 | Status: SHIPPED | OUTPATIENT
Start: 2024-01-01 | End: 2024-01-01

## 2024-01-01 RX ORDER — ALBUMIN (HUMAN) 12.5 G/50ML
70 SOLUTION INTRAVENOUS DAILY
Status: COMPLETED | OUTPATIENT
Start: 2024-01-01 | End: 2024-01-01

## 2024-01-01 RX ORDER — PRAVASTATIN SODIUM 40 MG
40 TABLET ORAL DAILY
Qty: 90 TABLET | Refills: 3 | Status: SHIPPED | OUTPATIENT
Start: 2024-01-01 | End: 2024-01-01

## 2024-01-01 RX ORDER — ACETAMINOPHEN 650 MG/1
650 SUPPOSITORY RECTAL EVERY 4 HOURS PRN
Status: DISCONTINUED | OUTPATIENT
Start: 2024-01-01 | End: 2024-01-01 | Stop reason: HOSPADM

## 2024-01-01 RX ORDER — AMOXICILLIN 250 MG
2 CAPSULE ORAL 2 TIMES DAILY PRN
Status: DISCONTINUED | OUTPATIENT
Start: 2024-01-01 | End: 2024-01-01 | Stop reason: HOSPADM

## 2024-01-01 RX ORDER — SPIRONOLACTONE 25 MG/1
12.5 TABLET ORAL DAILY
Qty: 30 TABLET | Refills: 0 | Status: SHIPPED | OUTPATIENT
Start: 2024-01-01 | End: 2024-01-01

## 2024-01-01 RX ORDER — ALBUTEROL SULFATE 90 UG/1
2 AEROSOL, METERED RESPIRATORY (INHALATION) EVERY 4 HOURS PRN
Status: DISCONTINUED | OUTPATIENT
Start: 2024-01-01 | End: 2024-01-01 | Stop reason: HOSPADM

## 2024-01-01 RX ORDER — FUROSEMIDE 10 MG/ML
60 INJECTION INTRAMUSCULAR; INTRAVENOUS EVERY 6 HOURS
Status: DISCONTINUED | OUTPATIENT
Start: 2024-01-01 | End: 2024-01-01

## 2024-01-01 RX ORDER — FUROSEMIDE 10 MG/ML
40 INJECTION INTRAMUSCULAR; INTRAVENOUS ONCE
Status: COMPLETED | OUTPATIENT
Start: 2024-01-01 | End: 2024-01-01

## 2024-01-01 RX ORDER — CEFTRIAXONE 2 G/1
2 INJECTION, POWDER, FOR SOLUTION INTRAMUSCULAR; INTRAVENOUS EVERY 24 HOURS
Status: DISCONTINUED | OUTPATIENT
Start: 2024-01-01 | End: 2024-01-01

## 2024-01-01 RX ORDER — CEFTRIAXONE 2 G/1
2 INJECTION, POWDER, FOR SOLUTION INTRAMUSCULAR; INTRAVENOUS ONCE
Status: COMPLETED | OUTPATIENT
Start: 2024-01-01 | End: 2024-01-01

## 2024-01-01 RX ORDER — MIDODRINE HYDROCHLORIDE 5 MG/1
5 TABLET ORAL 3 TIMES DAILY
Status: DISCONTINUED | OUTPATIENT
Start: 2024-01-01 | End: 2024-01-01 | Stop reason: HOSPADM

## 2024-01-01 RX ORDER — METOPROLOL SUCCINATE 100 MG/1
200 TABLET, EXTENDED RELEASE ORAL DAILY
Status: DISCONTINUED | OUTPATIENT
Start: 2024-01-01 | End: 2024-01-01

## 2024-01-01 RX ORDER — MAGNESIUM OXIDE 400 MG/1
400 TABLET ORAL DAILY
Status: DISCONTINUED | OUTPATIENT
Start: 2024-01-01 | End: 2024-01-01 | Stop reason: HOSPADM

## 2024-01-01 RX ORDER — CLOTRIMAZOLE AND BETAMETHASONE DIPROPIONATE 10; .5 MG/ML; MG/ML
LOTION TOPICAL 2 TIMES DAILY
Qty: 30 ML | Refills: 3 | Status: SHIPPED | OUTPATIENT
Start: 2024-01-01 | End: 2024-01-01

## 2024-01-01 RX ORDER — MAGNESIUM OXIDE 400 MG/1
400 TABLET ORAL 2 TIMES DAILY
Qty: 100 TABLET | Refills: 0 | Status: SHIPPED | OUTPATIENT
Start: 2024-01-01 | End: 2024-01-01

## 2024-01-01 RX ORDER — METOPROLOL SUCCINATE 200 MG/1
200 TABLET, EXTENDED RELEASE ORAL DAILY
Status: ON HOLD | COMMUNITY
End: 2024-01-01

## 2024-01-01 RX ORDER — OMEPRAZOLE 40 MG/1
CAPSULE, DELAYED RELEASE ORAL
Qty: 90 CAPSULE | Refills: 3 | Status: SHIPPED | OUTPATIENT
Start: 2024-01-01

## 2024-01-01 RX ORDER — FUROSEMIDE 20 MG
20 TABLET ORAL 2 TIMES DAILY
Qty: 60 TABLET | Refills: 3 | Status: ON HOLD | OUTPATIENT
Start: 2024-01-01 | End: 2024-01-01

## 2024-01-01 RX ORDER — MAGNESIUM OXIDE 400 MG/1
400 TABLET ORAL DAILY
Qty: 100 TABLET | Refills: 2 | Status: SHIPPED | OUTPATIENT
Start: 2024-01-01 | End: 2024-01-01

## 2024-01-01 RX ORDER — METOLAZONE 2.5 MG/1
2.5 TABLET ORAL ONCE
Status: COMPLETED | OUTPATIENT
Start: 2024-01-01 | End: 2024-01-01

## 2024-01-01 RX ORDER — ACETAMINOPHEN 325 MG/1
650 TABLET ORAL EVERY 4 HOURS PRN
Status: DISCONTINUED | OUTPATIENT
Start: 2024-01-01 | End: 2024-01-01 | Stop reason: HOSPADM

## 2024-01-01 RX ORDER — FUROSEMIDE 20 MG
20 TABLET ORAL 2 TIMES DAILY
Qty: 60 TABLET | Refills: 0 | Status: SHIPPED | OUTPATIENT
Start: 2024-01-01 | End: 2024-01-01

## 2024-01-01 RX ORDER — FUROSEMIDE 10 MG/ML
40 INJECTION INTRAMUSCULAR; INTRAVENOUS EVERY 8 HOURS
Status: DISCONTINUED | OUTPATIENT
Start: 2024-01-01 | End: 2024-01-01

## 2024-01-01 RX ORDER — LACTULOSE 10 G/15ML
20 SOLUTION ORAL
Status: DISCONTINUED | OUTPATIENT
Start: 2024-01-01 | End: 2024-01-01 | Stop reason: HOSPADM

## 2024-01-01 RX ORDER — IPRATROPIUM BROMIDE AND ALBUTEROL SULFATE 2.5; .5 MG/3ML; MG/3ML
3 SOLUTION RESPIRATORY (INHALATION) EVERY 4 HOURS PRN
Status: DISCONTINUED | OUTPATIENT
Start: 2024-01-01 | End: 2024-01-01 | Stop reason: HOSPADM

## 2024-01-01 RX ORDER — FUROSEMIDE 20 MG
20 TABLET ORAL
Status: DISCONTINUED | OUTPATIENT
Start: 2024-01-01 | End: 2024-01-01 | Stop reason: HOSPADM

## 2024-01-01 RX ORDER — ALBUMIN (HUMAN) 12.5 G/50ML
25-50 SOLUTION INTRAVENOUS ONCE
Status: COMPLETED | OUTPATIENT
Start: 2024-01-01 | End: 2024-01-01

## 2024-01-01 RX ORDER — PRAVASTATIN SODIUM 40 MG
40 TABLET ORAL DAILY
COMMUNITY
End: 2024-01-01

## 2024-01-01 RX ORDER — SODIUM CHLORIDE 3 G/100ML
50 INJECTION, SOLUTION INTRAVENOUS ONCE
Status: COMPLETED | OUTPATIENT
Start: 2024-01-01 | End: 2024-01-01

## 2024-01-01 RX ORDER — MAGNESIUM OXIDE 400 MG/1
400 TABLET ORAL EVERY 4 HOURS
Status: COMPLETED | OUTPATIENT
Start: 2024-01-01 | End: 2024-01-01

## 2024-01-01 RX ORDER — CEFTRIAXONE 1 G/1
1 INJECTION, POWDER, FOR SOLUTION INTRAMUSCULAR; INTRAVENOUS ONCE
Status: COMPLETED | OUTPATIENT
Start: 2024-01-01 | End: 2024-01-01

## 2024-01-01 RX ORDER — PROCHLORPERAZINE MALEATE 5 MG
5 TABLET ORAL EVERY 6 HOURS PRN
Status: DISCONTINUED | OUTPATIENT
Start: 2024-01-01 | End: 2024-01-01 | Stop reason: HOSPADM

## 2024-01-01 RX ORDER — ALBUMIN (HUMAN) 12.5 G/50ML
25 SOLUTION INTRAVENOUS ONCE
Status: COMPLETED | OUTPATIENT
Start: 2024-01-01 | End: 2024-01-01

## 2024-01-01 RX ORDER — SPIRONOLACTONE 25 MG/1
25 TABLET ORAL DAILY
Status: DISCONTINUED | OUTPATIENT
Start: 2024-01-01 | End: 2024-01-01 | Stop reason: HOSPADM

## 2024-01-01 RX ORDER — LIDOCAINE 40 MG/G
CREAM TOPICAL
Status: CANCELLED | OUTPATIENT
Start: 2024-01-01

## 2024-01-01 RX ORDER — MIDODRINE HYDROCHLORIDE 2.5 MG/1
5 TABLET ORAL 3 TIMES DAILY
Status: DISCONTINUED | OUTPATIENT
Start: 2024-01-01 | End: 2024-01-01 | Stop reason: HOSPADM

## 2024-01-01 RX ORDER — LISINOPRIL 20 MG/1
20 TABLET ORAL DAILY
Qty: 90 TABLET | Refills: 3 | Status: ON HOLD | OUTPATIENT
Start: 2024-01-01 | End: 2024-01-01

## 2024-01-01 RX ORDER — SODIUM CHLORIDE 9 MG/ML
INJECTION, SOLUTION INTRAVENOUS CONTINUOUS
Status: DISCONTINUED | OUTPATIENT
Start: 2024-01-01 | End: 2024-01-01 | Stop reason: HOSPADM

## 2024-01-01 RX ORDER — POLYETHYLENE GLYCOL 3350 17 G
2 POWDER IN PACKET (EA) ORAL
Status: DISCONTINUED | OUTPATIENT
Start: 2024-01-01 | End: 2024-01-01 | Stop reason: HOSPADM

## 2024-01-01 RX ORDER — CALCIUM CARBONATE 500 MG/1
1000 TABLET, CHEWABLE ORAL 4 TIMES DAILY PRN
Status: DISCONTINUED | OUTPATIENT
Start: 2024-01-01 | End: 2024-01-01 | Stop reason: HOSPADM

## 2024-01-01 RX ORDER — METOPROLOL SUCCINATE 25 MG/1
25 TABLET, EXTENDED RELEASE ORAL DAILY
Status: DISCONTINUED | OUTPATIENT
Start: 2024-01-01 | End: 2024-01-01 | Stop reason: HOSPADM

## 2024-01-01 RX ORDER — ONDANSETRON 2 MG/ML
4 INJECTION INTRAMUSCULAR; INTRAVENOUS EVERY 6 HOURS PRN
Status: DISCONTINUED | OUTPATIENT
Start: 2024-01-01 | End: 2024-01-01 | Stop reason: HOSPADM

## 2024-01-01 RX ORDER — POTASSIUM CHLORIDE 1500 MG/1
20 TABLET, EXTENDED RELEASE ORAL ONCE
Status: DISCONTINUED | OUTPATIENT
Start: 2024-01-01 | End: 2024-01-01

## 2024-01-01 RX ORDER — LACTULOSE 10 G/15ML
20 SOLUTION ORAL 3 TIMES DAILY
Status: DISCONTINUED | OUTPATIENT
Start: 2024-01-01 | End: 2024-01-01

## 2024-01-01 RX ORDER — MAGNESIUM OXIDE 400 MG/1
400 TABLET ORAL 2 TIMES DAILY
Status: DISCONTINUED | OUTPATIENT
Start: 2024-01-01 | End: 2024-01-01 | Stop reason: HOSPADM

## 2024-01-01 RX ORDER — SPIRONOLACTONE 25 MG/1
25 TABLET ORAL DAILY
Qty: 30 TABLET | Refills: 3 | Status: ON HOLD | OUTPATIENT
Start: 2024-01-01 | End: 2024-01-01

## 2024-01-01 RX ORDER — SODIUM CHLORIDE 9 MG/ML
INJECTION, SOLUTION INTRAVENOUS CONTINUOUS
Status: DISCONTINUED | OUTPATIENT
Start: 2024-01-01 | End: 2024-01-01

## 2024-01-01 RX ORDER — SPIRONOLACTONE 25 MG
12.5 TABLET ORAL DAILY
Status: DISCONTINUED | OUTPATIENT
Start: 2024-01-01 | End: 2024-01-01 | Stop reason: HOSPADM

## 2024-01-01 RX ORDER — OCTREOTIDE ACETATE 200 UG/ML
100 INJECTION INTRAVENOUS 3 TIMES DAILY
Status: DISCONTINUED | OUTPATIENT
Start: 2024-01-01 | End: 2024-01-01 | Stop reason: HOSPADM

## 2024-01-01 RX ORDER — LACTULOSE SOLUTION USP, 10 G/15 ML 10 G/15ML
20 SOLUTION ORAL; RECTAL
Status: DISCONTINUED | OUTPATIENT
Start: 2024-01-01 | End: 2024-01-01

## 2024-01-01 RX ORDER — FUROSEMIDE 20 MG
20 TABLET ORAL 2 TIMES DAILY
Status: DISCONTINUED | OUTPATIENT
Start: 2024-01-01 | End: 2024-01-01 | Stop reason: HOSPADM

## 2024-01-01 RX ORDER — PRAVASTATIN SODIUM 20 MG
40 TABLET ORAL DAILY
Status: DISCONTINUED | OUTPATIENT
Start: 2024-01-01 | End: 2024-01-01

## 2024-01-01 RX ORDER — SODIUM CHLORIDE 1 G/1
1 TABLET ORAL ONCE
Status: COMPLETED | OUTPATIENT
Start: 2024-01-01 | End: 2024-01-01

## 2024-01-01 RX ORDER — OCTREOTIDE ACETATE 100 UG/ML
100 INJECTION, SOLUTION INTRAVENOUS; SUBCUTANEOUS 3 TIMES DAILY
Status: DISCONTINUED | OUTPATIENT
Start: 2024-01-01 | End: 2024-01-01

## 2024-01-01 RX ORDER — ONDANSETRON 4 MG/1
4 TABLET, ORALLY DISINTEGRATING ORAL EVERY 6 HOURS PRN
Status: DISCONTINUED | OUTPATIENT
Start: 2024-01-01 | End: 2024-01-01 | Stop reason: HOSPADM

## 2024-01-01 RX ORDER — ALBUMIN (HUMAN) 12.5 G/50ML
12.5 SOLUTION INTRAVENOUS ONCE
Status: DISCONTINUED | OUTPATIENT
Start: 2024-01-01 | End: 2024-01-01

## 2024-01-01 RX ORDER — AMOXICILLIN 250 MG
1 CAPSULE ORAL 2 TIMES DAILY PRN
Status: DISCONTINUED | OUTPATIENT
Start: 2024-01-01 | End: 2024-01-01 | Stop reason: HOSPADM

## 2024-01-01 RX ORDER — METOPROLOL SUCCINATE 100 MG/1
200 TABLET, EXTENDED RELEASE ORAL DAILY
Status: DISCONTINUED | OUTPATIENT
Start: 2024-01-01 | End: 2024-01-01 | Stop reason: HOSPADM

## 2024-01-01 RX ORDER — THIAMINE HYDROCHLORIDE 100 MG/ML
100 INJECTION, SOLUTION INTRAMUSCULAR; INTRAVENOUS DAILY
Status: DISCONTINUED | OUTPATIENT
Start: 2024-01-01 | End: 2024-01-01 | Stop reason: HOSPADM

## 2024-01-01 RX ORDER — MIDODRINE HYDROCHLORIDE 5 MG/1
5 TABLET ORAL 3 TIMES DAILY
COMMUNITY

## 2024-01-01 RX ORDER — DOXYCYCLINE 100 MG/1
100 CAPSULE ORAL 2 TIMES DAILY
Qty: 20 CAPSULE | Refills: 0 | Status: ON HOLD | OUTPATIENT
Start: 2024-01-01 | End: 2024-01-01

## 2024-01-01 RX ORDER — LACTULOSE 10 G/15ML
20 SOLUTION ORAL EVERY 6 HOURS
Status: DISCONTINUED | OUTPATIENT
Start: 2024-01-01 | End: 2024-01-01 | Stop reason: HOSPADM

## 2024-01-01 RX ORDER — METOPROLOL SUCCINATE 25 MG/1
25 TABLET, EXTENDED RELEASE ORAL DAILY
Qty: 30 TABLET | Refills: 0 | Status: SHIPPED | OUTPATIENT
Start: 2024-01-01

## 2024-01-01 RX ORDER — LISINOPRIL 20 MG/1
20 TABLET ORAL DAILY
Status: ON HOLD | COMMUNITY
End: 2024-01-01

## 2024-01-01 RX ORDER — LISINOPRIL 10 MG/1
20 TABLET ORAL DAILY
Status: DISCONTINUED | OUTPATIENT
Start: 2024-01-01 | End: 2024-01-01 | Stop reason: HOSPADM

## 2024-01-01 RX ORDER — FUROSEMIDE 20 MG
20 TABLET ORAL DAILY
Qty: 7 TABLET | Refills: 0 | Status: ON HOLD | OUTPATIENT
Start: 2024-01-01 | End: 2024-01-01

## 2024-01-01 RX ORDER — HYDRALAZINE HYDROCHLORIDE 20 MG/ML
10 INJECTION INTRAMUSCULAR; INTRAVENOUS EVERY 4 HOURS PRN
Status: DISCONTINUED | OUTPATIENT
Start: 2024-01-01 | End: 2024-01-01 | Stop reason: HOSPADM

## 2024-01-01 RX ORDER — HYDRALAZINE HYDROCHLORIDE 10 MG/1
10 TABLET, FILM COATED ORAL EVERY 4 HOURS PRN
Status: DISCONTINUED | OUTPATIENT
Start: 2024-01-01 | End: 2024-01-01 | Stop reason: HOSPADM

## 2024-01-01 RX ORDER — LIDOCAINE 40 MG/G
CREAM TOPICAL
Status: DISCONTINUED | OUTPATIENT
Start: 2024-01-01 | End: 2024-01-01

## 2024-01-01 RX ORDER — PROCHLORPERAZINE 25 MG
12.5 SUPPOSITORY, RECTAL RECTAL EVERY 12 HOURS PRN
Status: DISCONTINUED | OUTPATIENT
Start: 2024-01-01 | End: 2024-01-01 | Stop reason: HOSPADM

## 2024-01-01 RX ORDER — PANTOPRAZOLE SODIUM 40 MG/1
40 TABLET, DELAYED RELEASE ORAL
Status: DISCONTINUED | OUTPATIENT
Start: 2024-01-01 | End: 2024-01-01 | Stop reason: HOSPADM

## 2024-01-01 RX ORDER — PRAVASTATIN SODIUM 20 MG
20 TABLET ORAL DAILY
Status: DISCONTINUED | OUTPATIENT
Start: 2024-01-01 | End: 2024-01-01 | Stop reason: HOSPADM

## 2024-01-01 RX ORDER — METOPROLOL SUCCINATE 25 MG/1
25 TABLET, EXTENDED RELEASE ORAL DAILY
Status: DISCONTINUED | OUTPATIENT
Start: 2024-01-01 | End: 2024-01-01

## 2024-01-01 RX ORDER — LACTULOSE 10 G/15ML
20 SOLUTION ORAL 3 TIMES DAILY
Qty: 2700 ML | Refills: 0 | Status: SHIPPED | OUTPATIENT
Start: 2024-01-01 | End: 2024-10-23

## 2024-01-01 RX ORDER — RESPIRATORY SYNCYTIAL VIRUS VACCINE 120MCG/0.5
0.5 KIT INTRAMUSCULAR ONCE
Qty: 1 EACH | Refills: 0 | Status: CANCELLED | OUTPATIENT
Start: 2024-01-01 | End: 2024-01-01

## 2024-01-01 RX ORDER — CEFAZOLIN SODIUM/WATER 2 G/20 ML
2 SYRINGE (ML) INTRAVENOUS ONCE
Status: COMPLETED | OUTPATIENT
Start: 2024-01-01 | End: 2024-01-01

## 2024-01-01 RX ORDER — METOPROLOL SUCCINATE 200 MG/1
200 TABLET, EXTENDED RELEASE ORAL DAILY
Qty: 90 TABLET | Refills: 3 | Status: ON HOLD | OUTPATIENT
Start: 2024-01-01 | End: 2024-01-01

## 2024-01-01 RX ADMIN — SODIUM CHLORIDE: 9 INJECTION, SOLUTION INTRAVENOUS at 00:56

## 2024-01-01 RX ADMIN — Medication 400 MG: at 09:20

## 2024-01-01 RX ADMIN — CEFTRIAXONE SODIUM 2 G: 2 INJECTION, POWDER, FOR SOLUTION INTRAMUSCULAR; INTRAVENOUS at 21:12

## 2024-01-01 RX ADMIN — LACTULOSE 20 G: 20 SOLUTION ORAL at 21:12

## 2024-01-01 RX ADMIN — POTASSIUM CHLORIDE 20 MEQ: 1500 TABLET, EXTENDED RELEASE ORAL at 11:30

## 2024-01-01 RX ADMIN — POTASSIUM & SODIUM PHOSPHATES POWDER PACK 280-160-250 MG 1 PACKET: 280-160-250 PACK at 11:37

## 2024-01-01 RX ADMIN — FUROSEMIDE 60 MG: 10 INJECTION, SOLUTION INTRAMUSCULAR; INTRAVENOUS at 02:04

## 2024-01-01 RX ADMIN — SODIUM CHLORIDE 50 ML: 3 INJECTION, SOLUTION INTRAVENOUS at 18:00

## 2024-01-01 RX ADMIN — FUROSEMIDE 40 MG: 10 INJECTION, SOLUTION INTRAMUSCULAR; INTRAVENOUS at 12:49

## 2024-01-01 RX ADMIN — MIDODRINE HYDROCHLORIDE 5 MG: 2.5 TABLET ORAL at 13:58

## 2024-01-01 RX ADMIN — ALBUMIN HUMAN 70 G: 0.25 SOLUTION INTRAVENOUS at 10:46

## 2024-01-01 RX ADMIN — LACTULOSE 20 G: 20 SOLUTION ORAL at 21:19

## 2024-01-01 RX ADMIN — CEPHALEXIN 250 MG: 250 CAPSULE ORAL at 04:18

## 2024-01-01 RX ADMIN — Medication 400 MG: at 09:06

## 2024-01-01 RX ADMIN — PANTOPRAZOLE SODIUM 40 MG: 40 TABLET, DELAYED RELEASE ORAL at 06:45

## 2024-01-01 RX ADMIN — METOPROLOL SUCCINATE 25 MG: 25 TABLET, EXTENDED RELEASE ORAL at 12:41

## 2024-01-01 RX ADMIN — Medication 2 G: at 13:45

## 2024-01-01 RX ADMIN — ALBUMIN HUMAN 70 G: 0.25 SOLUTION INTRAVENOUS at 09:05

## 2024-01-01 RX ADMIN — Medication 400 MG: at 08:08

## 2024-01-01 RX ADMIN — PRAVASTATIN SODIUM 20 MG: 20 TABLET ORAL at 08:21

## 2024-01-01 RX ADMIN — ALBUMIN HUMAN 37.5 G: 0.25 SOLUTION INTRAVENOUS at 17:51

## 2024-01-01 RX ADMIN — PANTOPRAZOLE SODIUM 40 MG: 40 TABLET, DELAYED RELEASE ORAL at 17:18

## 2024-01-01 RX ADMIN — PANTOPRAZOLE SODIUM 40 MG: 40 TABLET, DELAYED RELEASE ORAL at 05:59

## 2024-01-01 RX ADMIN — FUROSEMIDE 60 MG: 10 INJECTION, SOLUTION INTRAMUSCULAR; INTRAVENOUS at 15:47

## 2024-01-01 RX ADMIN — FUROSEMIDE 20 MG: 20 TABLET ORAL at 17:24

## 2024-01-01 RX ADMIN — LACTULOSE 20 G: 20 SOLUTION ORAL at 18:26

## 2024-01-01 RX ADMIN — SODIUM CHLORIDE TAB 1 GM 1 G: 1 TAB at 10:17

## 2024-01-01 RX ADMIN — Medication 400 MG: at 10:17

## 2024-01-01 RX ADMIN — FUROSEMIDE 20 MG: 20 TABLET ORAL at 08:21

## 2024-01-01 RX ADMIN — ALBUMIN HUMAN 25 G: 0.25 SOLUTION INTRAVENOUS at 10:16

## 2024-01-01 RX ADMIN — OCTREOTIDE ACETATE 100 MCG: 100 INJECTION, SOLUTION INTRAVENOUS; SUBCUTANEOUS at 09:10

## 2024-01-01 RX ADMIN — CEPHALEXIN 250 MG: 250 CAPSULE ORAL at 15:56

## 2024-01-01 RX ADMIN — PRAVASTATIN SODIUM 20 MG: 20 TABLET ORAL at 08:50

## 2024-01-01 RX ADMIN — LIDOCAINE HYDROCHLORIDE 10 ML: 10 INJECTION, SOLUTION EPIDURAL; INFILTRATION; INTRACAUDAL; PERINEURAL at 11:15

## 2024-01-01 RX ADMIN — LACTULOSE 20 G: 20 SOLUTION ORAL at 09:20

## 2024-01-01 RX ADMIN — FUROSEMIDE 20 MG: 20 TABLET ORAL at 15:56

## 2024-01-01 RX ADMIN — LIDOCAINE HYDROCHLORIDE 10 ML: 10 INJECTION, SOLUTION EPIDURAL; INFILTRATION; INTRACAUDAL; PERINEURAL at 11:01

## 2024-01-01 RX ADMIN — PANTOPRAZOLE SODIUM 40 MG: 40 TABLET, DELAYED RELEASE ORAL at 16:00

## 2024-01-01 RX ADMIN — MIDODRINE HYDROCHLORIDE 5 MG: 2.5 TABLET ORAL at 13:47

## 2024-01-01 RX ADMIN — FUROSEMIDE 40 MG: 10 INJECTION, SOLUTION INTRAMUSCULAR; INTRAVENOUS at 13:44

## 2024-01-01 RX ADMIN — METOLAZONE 2.5 MG: 2.5 TABLET ORAL at 09:22

## 2024-01-01 RX ADMIN — Medication 400 MG: at 09:12

## 2024-01-01 RX ADMIN — PANTOPRAZOLE SODIUM 40 MG: 40 TABLET, DELAYED RELEASE ORAL at 05:33

## 2024-01-01 RX ADMIN — CEPHALEXIN 250 MG: 250 CAPSULE ORAL at 11:31

## 2024-01-01 RX ADMIN — MIDODRINE HYDROCHLORIDE 5 MG: 5 TABLET ORAL at 09:11

## 2024-01-01 RX ADMIN — Medication 400 MG: at 12:49

## 2024-01-01 RX ADMIN — Medication 2 G: at 22:11

## 2024-01-01 RX ADMIN — THIAMINE HCL TAB 100 MG 100 MG: 100 TAB at 18:05

## 2024-01-01 RX ADMIN — MIDODRINE HYDROCHLORIDE 5 MG: 2.5 TABLET ORAL at 21:25

## 2024-01-01 RX ADMIN — LACTULOSE 20 G: 20 SOLUTION ORAL at 09:11

## 2024-01-01 RX ADMIN — PANTOPRAZOLE SODIUM 40 MG: 40 TABLET, DELAYED RELEASE ORAL at 15:56

## 2024-01-01 RX ADMIN — MAGNESIUM SULFATE HEPTAHYDRATE 4 G: 40 INJECTION, SOLUTION INTRAVENOUS at 11:03

## 2024-01-01 RX ADMIN — FUROSEMIDE 40 MG: 10 INJECTION, SOLUTION INTRAMUSCULAR; INTRAVENOUS at 21:18

## 2024-01-01 RX ADMIN — MAGNESIUM SULFATE HEPTAHYDRATE 4 G: 40 INJECTION, SOLUTION INTRAVENOUS at 08:21

## 2024-01-01 RX ADMIN — PRAVASTATIN SODIUM 20 MG: 20 TABLET ORAL at 11:30

## 2024-01-01 RX ADMIN — OCTREOTIDE ACETATE 100 MCG: 100 INJECTION, SOLUTION INTRAVENOUS; SUBCUTANEOUS at 17:08

## 2024-01-01 RX ADMIN — SODIUM CHLORIDE TAB 1 GM 1 G: 1 TAB at 18:05

## 2024-01-01 RX ADMIN — FUROSEMIDE 20 MG: 20 TABLET ORAL at 08:50

## 2024-01-01 RX ADMIN — PANTOPRAZOLE SODIUM 40 MG: 40 TABLET, DELAYED RELEASE ORAL at 05:45

## 2024-01-01 RX ADMIN — SPIRONOLACTONE 12.5 MG: 25 TABLET ORAL at 08:21

## 2024-01-01 RX ADMIN — CEPHALEXIN 250 MG: 250 CAPSULE ORAL at 21:32

## 2024-01-01 RX ADMIN — OCTREOTIDE ACETATE 100 MCG: 100 INJECTION, SOLUTION INTRAVENOUS; SUBCUTANEOUS at 09:20

## 2024-01-01 RX ADMIN — POTASSIUM CHLORIDE 20 MEQ: 1500 TABLET, EXTENDED RELEASE ORAL at 05:45

## 2024-01-01 RX ADMIN — FUROSEMIDE 40 MG: 10 INJECTION, SOLUTION INTRAVENOUS at 11:00

## 2024-01-01 RX ADMIN — SODIUM PHOSPHATE, MONOBASIC, MONOHYDRATE AND SODIUM PHOSPHATE, DIBASIC, ANHYDROUS 9 MMOL: 142; 276 INJECTION, SOLUTION INTRAVENOUS at 05:45

## 2024-01-01 RX ADMIN — LIDOCAINE HYDROCHLORIDE 10 ML: 10 INJECTION, SOLUTION EPIDURAL; INFILTRATION; INTRACAUDAL; PERINEURAL at 15:29

## 2024-01-01 RX ADMIN — PANTOPRAZOLE SODIUM 40 MG: 40 TABLET, DELAYED RELEASE ORAL at 09:11

## 2024-01-01 RX ADMIN — CEPHALEXIN 250 MG: 250 CAPSULE ORAL at 17:18

## 2024-01-01 RX ADMIN — LACTULOSE 20 G: 20 SOLUTION ORAL at 09:06

## 2024-01-01 RX ADMIN — SPIRONOLACTONE 12.5 MG: 25 TABLET ORAL at 08:50

## 2024-01-01 RX ADMIN — Medication 400 MG: at 08:21

## 2024-01-01 RX ADMIN — OCTREOTIDE ACETATE 100 MCG: 100 INJECTION, SOLUTION INTRAVENOUS; SUBCUTANEOUS at 02:12

## 2024-01-01 RX ADMIN — PANTOPRAZOLE SODIUM 40 MG: 40 TABLET, DELAYED RELEASE ORAL at 15:46

## 2024-01-01 RX ADMIN — MIDODRINE HYDROCHLORIDE 10 MG: 2.5 TABLET ORAL at 04:18

## 2024-01-01 RX ADMIN — CEPHALEXIN 250 MG: 250 CAPSULE ORAL at 09:51

## 2024-01-01 RX ADMIN — IPRATROPIUM BROMIDE AND ALBUTEROL SULFATE 3 ML: .5; 3 SOLUTION RESPIRATORY (INHALATION) at 07:52

## 2024-01-01 RX ADMIN — Medication 400 MG: at 08:50

## 2024-01-01 RX ADMIN — SODIUM CHLORIDE 1000 ML: 9 INJECTION, SOLUTION INTRAVENOUS at 11:13

## 2024-01-01 RX ADMIN — Medication 400 MG: at 21:12

## 2024-01-01 RX ADMIN — MIDODRINE HYDROCHLORIDE 5 MG: 2.5 TABLET ORAL at 08:21

## 2024-01-01 RX ADMIN — CEPHALEXIN 250 MG: 250 CAPSULE ORAL at 05:33

## 2024-01-01 RX ADMIN — FUROSEMIDE 40 MG: 10 INJECTION, SOLUTION INTRAMUSCULAR; INTRAVENOUS at 05:59

## 2024-01-01 RX ADMIN — CEPHALEXIN 250 MG: 250 CAPSULE ORAL at 22:13

## 2024-01-01 RX ADMIN — OMEPRAZOLE 40 MG: 20 CAPSULE, DELAYED RELEASE ORAL at 17:08

## 2024-01-01 RX ADMIN — CEFTRIAXONE SODIUM 1 G: 1 INJECTION, POWDER, FOR SOLUTION INTRAMUSCULAR; INTRAVENOUS at 11:23

## 2024-01-01 RX ADMIN — Medication 400 MG: at 02:12

## 2024-01-01 RX ADMIN — Medication 2 G: at 21:18

## 2024-01-01 RX ADMIN — Medication 2 G: at 05:16

## 2024-01-01 RX ADMIN — SODIUM CHLORIDE TAB 1 GM 1 G: 1 TAB at 17:20

## 2024-01-01 RX ADMIN — FUROSEMIDE 40 MG: 10 INJECTION, SOLUTION INTRAMUSCULAR; INTRAVENOUS at 21:12

## 2024-01-01 RX ADMIN — Medication 2 G: at 21:12

## 2024-01-01 RX ADMIN — Medication 2 G: at 05:59

## 2024-01-01 RX ADMIN — OCTREOTIDE ACETATE 100 MCG: 200 INJECTION, SOLUTION INTRAVENOUS; SUBCUTANEOUS at 21:19

## 2024-01-01 RX ADMIN — LACTULOSE 20 G: 20 SOLUTION ORAL at 04:04

## 2024-01-01 RX ADMIN — Medication 2 G: at 12:58

## 2024-01-01 RX ADMIN — Medication 400 MG: at 09:11

## 2024-01-01 RX ADMIN — PANTOPRAZOLE SODIUM 40 MG: 40 TABLET, DELAYED RELEASE ORAL at 15:59

## 2024-01-01 RX ADMIN — POTASSIUM & SODIUM PHOSPHATES POWDER PACK 280-160-250 MG 1 PACKET: 280-160-250 PACK at 08:21

## 2024-01-01 RX ADMIN — OCTREOTIDE ACETATE 100 MCG: 100 INJECTION, SOLUTION INTRAVENOUS; SUBCUTANEOUS at 21:12

## 2024-01-01 RX ADMIN — CEPHALEXIN 250 MG: 250 CAPSULE ORAL at 09:42

## 2024-01-01 RX ADMIN — CEFTRIAXONE SODIUM 2 G: 2 INJECTION, POWDER, FOR SOLUTION INTRAMUSCULAR; INTRAVENOUS at 20:11

## 2024-01-01 RX ADMIN — MIDODRINE HYDROCHLORIDE 5 MG: 5 TABLET ORAL at 17:08

## 2024-01-01 RX ADMIN — MAGNESIUM SULFATE HEPTAHYDRATE 4 G: 40 INJECTION, SOLUTION INTRAVENOUS at 08:41

## 2024-01-01 RX ADMIN — LACTULOSE 20 G: 20 SOLUTION ORAL at 17:08

## 2024-01-01 RX ADMIN — Medication 2 G: at 12:49

## 2024-01-01 RX ADMIN — MAGNESIUM SULFATE HEPTAHYDRATE 4 G: 40 INJECTION, SOLUTION INTRAVENOUS at 05:04

## 2024-01-01 RX ADMIN — OCTREOTIDE ACETATE 100 MCG: 200 INJECTION, SOLUTION INTRAVENOUS; SUBCUTANEOUS at 09:14

## 2024-01-01 RX ADMIN — Medication 2 G: at 04:45

## 2024-01-01 RX ADMIN — Medication 400 MG: at 17:52

## 2024-01-01 RX ADMIN — OCTREOTIDE ACETATE 100 MCG: 200 INJECTION, SOLUTION INTRAVENOUS; SUBCUTANEOUS at 17:50

## 2024-01-01 RX ADMIN — FOLIC ACID 1 MG: 1 TABLET ORAL at 18:05

## 2024-01-01 RX ADMIN — FUROSEMIDE 40 MG: 10 INJECTION, SOLUTION INTRAMUSCULAR; INTRAVENOUS at 04:45

## 2024-01-01 RX ADMIN — OMEPRAZOLE 40 MG: 20 CAPSULE, DELAYED RELEASE ORAL at 16:33

## 2024-01-01 RX ADMIN — SODIUM CHLORIDE: 9 INJECTION, SOLUTION INTRAVENOUS at 17:11

## 2024-01-01 RX ADMIN — Medication 400 MG: at 22:10

## 2024-01-01 RX ADMIN — SODIUM CHLORIDE: 9 INJECTION, SOLUTION INTRAVENOUS at 19:51

## 2024-01-01 RX ADMIN — Medication 400 MG: at 05:45

## 2024-01-01 RX ADMIN — LACTULOSE 20 G: 20 SOLUTION ORAL at 14:46

## 2024-01-01 SDOH — HEALTH STABILITY: PHYSICAL HEALTH: ON AVERAGE, HOW MANY DAYS PER WEEK DO YOU ENGAGE IN MODERATE TO STRENUOUS EXERCISE (LIKE A BRISK WALK)?: 0 DAYS

## 2024-01-01 SDOH — HEALTH STABILITY: PHYSICAL HEALTH: ON AVERAGE, HOW MANY MINUTES DO YOU ENGAGE IN EXERCISE AT THIS LEVEL?: 0 MIN

## 2024-01-01 ASSESSMENT — ACTIVITIES OF DAILY LIVING (ADL)
ADLS_ACUITY_SCORE: 33
ADLS_ACUITY_SCORE: 38
ADLS_ACUITY_SCORE: 21
ADLS_ACUITY_SCORE: 23
ADLS_ACUITY_SCORE: 21
ADLS_ACUITY_SCORE: 37
ADLS_ACUITY_SCORE: 37
ADLS_ACUITY_SCORE: 38
ADLS_ACUITY_SCORE: 32
ADLS_ACUITY_SCORE: 37
ADLS_ACUITY_SCORE: 23
ADLS_ACUITY_SCORE: 21
ADLS_ACUITY_SCORE: 23
ADLS_ACUITY_SCORE: 37
ADLS_ACUITY_SCORE: 21
ADLS_ACUITY_SCORE: 37
ADLS_ACUITY_SCORE: 33
ADLS_ACUITY_SCORE: 21
ADLS_ACUITY_SCORE: 37
ADLS_ACUITY_SCORE: 20
ADLS_ACUITY_SCORE: 23
ADLS_ACUITY_SCORE: 37
ADLS_ACUITY_SCORE: 23
ADLS_ACUITY_SCORE: 21
ADLS_ACUITY_SCORE: 21
ADLS_ACUITY_SCORE: 23
ADLS_ACUITY_SCORE: 21
ADLS_ACUITY_SCORE: 21
ADLS_ACUITY_SCORE: 38
ADLS_ACUITY_SCORE: 35
ADLS_ACUITY_SCORE: 21
ADLS_ACUITY_SCORE: 20
ADLS_ACUITY_SCORE: 23
ADLS_ACUITY_SCORE: 23
ADLS_ACUITY_SCORE: 35
ADLS_ACUITY_SCORE: 21
ADLS_ACUITY_SCORE: 21
ADLS_ACUITY_SCORE: 32
ADLS_ACUITY_SCORE: 39
ADLS_ACUITY_SCORE: 21
ADLS_ACUITY_SCORE: 33
ADLS_ACUITY_SCORE: 21
ADLS_ACUITY_SCORE: 20
ADLS_ACUITY_SCORE: 21
ADLS_ACUITY_SCORE: 23
ADLS_ACUITY_SCORE: 38
ADLS_ACUITY_SCORE: 33
ADLS_ACUITY_SCORE: 37
ADLS_ACUITY_SCORE: 21
ADLS_ACUITY_SCORE: 33
ADLS_ACUITY_SCORE: 33
ADLS_ACUITY_SCORE: 37
ADLS_ACUITY_SCORE: 23
ADLS_ACUITY_SCORE: 21
ADLS_ACUITY_SCORE: 23
ADLS_ACUITY_SCORE: 23
ADLS_ACUITY_SCORE: 21
ADLS_ACUITY_SCORE: 20
ADLS_ACUITY_SCORE: 23
ADLS_ACUITY_SCORE: 31
ADLS_ACUITY_SCORE: 21
ADLS_ACUITY_SCORE: 21
ADLS_ACUITY_SCORE: 20
ADLS_ACUITY_SCORE: 23
ADLS_ACUITY_SCORE: 21
ADLS_ACUITY_SCORE: 23
ADLS_ACUITY_SCORE: 36
ADLS_ACUITY_SCORE: 38
ADLS_ACUITY_SCORE: 21
ADLS_ACUITY_SCORE: 37
ADLS_ACUITY_SCORE: 21
ADLS_ACUITY_SCORE: 23
ADLS_ACUITY_SCORE: 21
ADLS_ACUITY_SCORE: 38
ADLS_ACUITY_SCORE: 35
ADLS_ACUITY_SCORE: 21
ADLS_ACUITY_SCORE: 37
ADLS_ACUITY_SCORE: 21
ADLS_ACUITY_SCORE: 37
ADLS_ACUITY_SCORE: 35
ADLS_ACUITY_SCORE: 37
ADLS_ACUITY_SCORE: 39
ADLS_ACUITY_SCORE: 21
ADLS_ACUITY_SCORE: 20
ADLS_ACUITY_SCORE: 35
ADLS_ACUITY_SCORE: 21
ADLS_ACUITY_SCORE: 33
ADLS_ACUITY_SCORE: 21
ADLS_ACUITY_SCORE: 21
ADLS_ACUITY_SCORE: 38
ADLS_ACUITY_SCORE: 21
ADLS_ACUITY_SCORE: 21
ADLS_ACUITY_SCORE: 38
ADLS_ACUITY_SCORE: 37
ADLS_ACUITY_SCORE: 31
ADLS_ACUITY_SCORE: 21
ADLS_ACUITY_SCORE: 23
ADLS_ACUITY_SCORE: 31
ADLS_ACUITY_SCORE: 31
ADLS_ACUITY_SCORE: 21
ADLS_ACUITY_SCORE: 37
ADLS_ACUITY_SCORE: 21
ADLS_ACUITY_SCORE: 33
ADLS_ACUITY_SCORE: 38
ADLS_ACUITY_SCORE: 21
ADLS_ACUITY_SCORE: 23
DEPENDENT_IADLS:: INDEPENDENT
ADLS_ACUITY_SCORE: 21
ADLS_ACUITY_SCORE: 38
ADLS_ACUITY_SCORE: 37
ADLS_ACUITY_SCORE: 23
ADLS_ACUITY_SCORE: 23
ADLS_ACUITY_SCORE: 21
ADLS_ACUITY_SCORE: 21
ADLS_ACUITY_SCORE: 32
ADLS_ACUITY_SCORE: 21
ADLS_ACUITY_SCORE: 38
ADLS_ACUITY_SCORE: 38
ADLS_ACUITY_SCORE: 23
ADLS_ACUITY_SCORE: 39
ADLS_ACUITY_SCORE: 38
ADLS_ACUITY_SCORE: 33
ADLS_ACUITY_SCORE: 39
ADLS_ACUITY_SCORE: 21
ADLS_ACUITY_SCORE: 37
ADLS_ACUITY_SCORE: 23
ADLS_ACUITY_SCORE: 37
ADLS_ACUITY_SCORE: 21
ADLS_ACUITY_SCORE: 33
ADLS_ACUITY_SCORE: 39
ADLS_ACUITY_SCORE: 32
ADLS_ACUITY_SCORE: 21
ADLS_ACUITY_SCORE: 21
ADLS_ACUITY_SCORE: 32
ADLS_ACUITY_SCORE: 38
ADLS_ACUITY_SCORE: 23
ADLS_ACUITY_SCORE: 21
ADLS_ACUITY_SCORE: 20
ADLS_ACUITY_SCORE: 21
ADLS_ACUITY_SCORE: 23
ADLS_ACUITY_SCORE: 37
ADLS_ACUITY_SCORE: 21
ADLS_ACUITY_SCORE: 21
ADLS_ACUITY_SCORE: 39
ADLS_ACUITY_SCORE: 39
ADLS_ACUITY_SCORE: 37
ADLS_ACUITY_SCORE: 21
ADLS_ACUITY_SCORE: 37
ADLS_ACUITY_SCORE: 21
ADLS_ACUITY_SCORE: 39
ADLS_ACUITY_SCORE: 37
ADLS_ACUITY_SCORE: 32
ADLS_ACUITY_SCORE: 21
ADLS_ACUITY_SCORE: 20
ADLS_ACUITY_SCORE: 21
ADLS_ACUITY_SCORE: 21
ADLS_ACUITY_SCORE: 23
ADLS_ACUITY_SCORE: 37
ADLS_ACUITY_SCORE: 20
ADLS_ACUITY_SCORE: 23
ADLS_ACUITY_SCORE: 38
ADLS_ACUITY_SCORE: 21
ADLS_ACUITY_SCORE: 38
ADLS_ACUITY_SCORE: 21
ADLS_ACUITY_SCORE: 39
ADLS_ACUITY_SCORE: 21
ADLS_ACUITY_SCORE: 23
ADLS_ACUITY_SCORE: 21
ADLS_ACUITY_SCORE: 33

## 2024-01-01 ASSESSMENT — COLUMBIA-SUICIDE SEVERITY RATING SCALE - C-SSRS
2. HAVE YOU ACTUALLY HAD ANY THOUGHTS OF KILLING YOURSELF IN THE PAST MONTH?: NO
6. HAVE YOU EVER DONE ANYTHING, STARTED TO DO ANYTHING, OR PREPARED TO DO ANYTHING TO END YOUR LIFE?: NO
6. HAVE YOU EVER DONE ANYTHING, STARTED TO DO ANYTHING, OR PREPARED TO DO ANYTHING TO END YOUR LIFE?: NO
2. HAVE YOU ACTUALLY HAD ANY THOUGHTS OF KILLING YOURSELF SINCE LAST CONTACT?: NO
1. IN THE PAST MONTH, HAVE YOU WISHED YOU WERE DEAD OR WISHED YOU COULD GO TO SLEEP AND NOT WAKE UP?: NO
6. HAVE YOU EVER DONE ANYTHING, STARTED TO DO ANYTHING, OR PREPARED TO DO ANYTHING TO END YOUR LIFE?: NO
6. HAVE YOU EVER DONE ANYTHING, STARTED TO DO ANYTHING, OR PREPARED TO DO ANYTHING TO END YOUR LIFE?: NO
2. HAVE YOU ACTUALLY HAD ANY THOUGHTS OF KILLING YOURSELF IN THE PAST MONTH?: NO
2. HAVE YOU ACTUALLY HAD ANY THOUGHTS OF KILLING YOURSELF IN THE PAST MONTH?: NO
1. IN THE PAST MONTH, HAVE YOU WISHED YOU WERE DEAD OR WISHED YOU COULD GO TO SLEEP AND NOT WAKE UP?: NO
6. HAVE YOU EVER DONE ANYTHING, STARTED TO DO ANYTHING, OR PREPARED TO DO ANYTHING TO END YOUR LIFE?: NO
2. HAVE YOU ACTUALLY HAD ANY THOUGHTS OF KILLING YOURSELF IN THE PAST MONTH?: NO

## 2024-01-01 ASSESSMENT — PAIN SCALES - GENERAL
PAINLEVEL: NO PAIN (1)
PAINLEVEL: NO PAIN (0)
PAINLEVEL: NO PAIN (0)

## 2024-01-01 ASSESSMENT — SOCIAL DETERMINANTS OF HEALTH (SDOH): HOW OFTEN DO YOU GET TOGETHER WITH FRIENDS OR RELATIVES?: ONCE A WEEK

## 2024-02-06 NOTE — PROGRESS NOTES
[facility] Care Coordination Contact    No longer active with [facility] community case management effective 12/31/23.  Reason for community disenrollment: Change Insurance    Care Coordinator received notification of disenrollment from 12/31/23:     Isabel Alvarez 1955 [facility] [id number] [facility] Teresa Not on [facility]'s report, currently enrolled in [employer] [place] is active with no Prepaid Health Plan noted after 12/31/23, are termed 12/31/23. Per  Intake/State Enrollment Team on 1/31/24, disenrolled on [facility] due to enrollment on another plan. Disenrolled.          Teresa Major, RN, PHN  [facility]

## 2024-03-01 NOTE — TELEPHONE ENCOUNTER
Medication Question or Refill    Contacts         Type Contact Phone/Fax    03/01/2024 12:42 PM CST Phone (Incoming) Isabel Alvarez (Self) [phone] (H)            What medication are you calling about (include dose and sig)?:   metoprolol succinate ER (TOPROL XL) 200 MG 24 hr tablet  lisinopril (ZESTRIL) 20 MG tablet  pravastatin (PRAVACHOL) 40 MG tablet  clotrimazole-betamethasone (LOTRISONE) 1-0.05 % external lotion    Preferred Pharmacy:   [address] [address]  Phone: [phone] Fax: [phone]      Controlled Substance Agreement on file:   CSA -- Patient Level:    CSA: None found at the patient level.       Who prescribed the medication?:     Josias Ch MD       Do you need a refill? Yes    When did you use the medication last? Today, patient will need a refill by 4/5/24    Patient offered an appointment? No, has upcoming appointment 4/17/24    Do you have any questions or concerns?  No      Okay to leave a detailed message?: Yes at Cell number on file:    Telephone Information:   Mobile [phone]

## 2024-04-17 NOTE — TELEPHONE ENCOUNTER
----- Message from Josias Ch MD sent at 4/17/2024 11:08 AM CDT -----  Please call him and let him know his sodium level is low.  His magnesium level are low.  These will cause the cramps in his legs.  I would recommend he go on magnesium 500 mg daily and recheck in a month.

## 2024-04-17 NOTE — PROGRESS NOTES
Preventive Care Visit  [facility]  Josias Ch MD, Internal Medicine  Apr 17, 2024      Assessment & Plan   Problem List Items Addressed This Visit       Benign essential hypertension    Relevant Orders    Magnesium    Comprehensive metabolic panel (BMP + Alb, Alk Phos, ALT, AST, Total. Bili, TP)    Lipid panel reflex to direct LDL Fasting    Albumin Random Urine Quantitative with Creat Ratio    OFFICE/OUTPT VISIT,EST,LEVL III (Completed)    Hyperlipidemia LDL goal <100    Relevant Orders    Comprehensive metabolic panel (BMP + Alb, Alk Phos, ALT, AST, Total. Bili, TP)    Lipid panel reflex to direct LDL Fasting    OFFICE/OUTPT VISIT,EST,LEVL III (Completed)    Tobacco abuse disorder    Relevant Orders    SMOKING CESSATION COUNSELING 3-10 MIN (Completed)    Prof fee: Shared Decision Making for Lung Cancer Screening (Completed)    CT Chest Lung Cancer Scrn Low Dose wo     Other Visit Diagnoses       Medicare annual wellness visit, initial    -  Primary    Screening for prostate cancer        Relevant Orders    PSA, screen    Gastroesophageal reflux disease without esophagitis        Relevant Medications    omeprazole (PRILOSEC) 40 MG DR capsule    Nicotine dependence, cigarettes, uncomplicated        Relevant Orders    Prof fee: Shared Decision Making for Lung Cancer Screening (Completed)    OFFICE/OUTPT VISIT,EST,LEVL III (Completed)    Personal history of nicotine dependence        Relevant Orders    CT Chest Lung Cancer Scrn Low Dose wo    Personal history of tobacco use        Medial tibial stress syndrome, unspecified laterality, initial encounter        Relevant Medications    diclofenac (VOLTAREN) 1 % topical gel    Other Relevant Orders    OFFICE/OUTPT VISIT,EST,LEVL III (Completed)           Medicare wellness exam, patient is doing well he is retired, lives with his parents who provides all of their care as they are 90 years old.  He had a Cologuard for screening, PSA for  screening.  Personal history of nicotine dependence will get a CT scan again this year looking for lung cancer since he is over 45 pack years, recommended smoking cessation he like to quit but already has nicotine replacement at home he will use.  Reflux disease continue omeprazole and refill sent  Blood pressure is controlled with lisinopril and metoprolol sometimes gets low blood pressure and feels lightheaded but has been running high here so we will keep him on his medications unless becomes more symptomatic.    Hyperlipidemia stable on pravastatin will check his labs and adjust as needed    Some nighttime cramps and pain on the anterior shin.  He should try to stretch this out we will refill his Voltaren gel to use on it we will also check labs for potassium magnesium.  Staying hydrated to help any cramps.                Patient has been advised of split billing requirements and indicates understanding: Yes  Ordering of each unique test  Prescription drug management       Nicotine/Tobacco Cessation  He reports that he has been smoking cigarettes. He has a 25 pack-year smoking history. He has never used smokeless tobacco.  Nicotine/Tobacco Cessation Plan  Information offered: Patient not interested at this time      Counseling  Appropriate preventive services were discussed with this patient, including applicable screening as appropriate for fall prevention, nutrition, physical activity, Tobacco-use cessation, weight loss and cognition.  Checklist reviewing preventive services available has been given to the patient.  Reviewed patient's diet, addressing concerns and/or questions.   The patient was instructed to see the dentist every 6 months.     FUTURE APPOINTMENTS:       - Follow-up for annual visit or as needed      Nasreen Hall is a 68 year old, presenting for the following:  Physical        4/17/2024     7:15 AM   Additional Questions   Roomed by [facility] Directive  Patient does not  have a Health Care Directive or Living Will: Discussed advance care planning with patient; however, patient declined at this time.    HPI    Doing ok, some shin splints at night,  otherwise doing well.  Worse with lots of walking.   Retired, lives [place], Cares for his parents, dad is turning 90.               4/17/2024   General Health   How would you rate your overall physical health? Excellent   Feel stress (tense, anxious, or unable to sleep) Not at all         4/17/2024   Nutrition   Diet: Regular (no restrictions)         4/17/2024   Exercise   Days per week of moderate/strenous exercise 0 days   Average minutes spent exercising at this level 0 min   (!) EXERCISE CONCERN      4/17/2024   Social Factors   Frequency of gathering with friends or relatives Once a week   Worry food won't last until get money to buy more No   Food not last or not have enough money for food? No   Do you have housing?  Yes   Are you worried about losing your housing? No   Lack of transportation? No   Unable to get utilities (heat,electricity)? No         4/17/2024   Fall Risk   Fallen 2 or more times in the past year? No   Trouble with walking or balance? Yes   Reason Gait Speed Test Not Completed Patient declines   Reason for decline hard time with legs          4/17/2024   Activities of Daily Living- Home Safety   Needs help with the following daily activites None of the above   Safety concerns in the home None of the above         4/17/2024   Dental   Dentist two times every year? (!) NO         4/17/2024   Hearing Screening   Hearing concerns? None of the above         4/17/2024   Driving Risk Screening   Patient/family members have concerns about driving No         4/17/2024   General Alertness/Fatigue Screening   Have you been more tired than usual lately? No         4/17/2024   Urinary Incontinence Screening   Bothered by leaking urine in past 6 months No         4/17/2024   TB Screening   Were you born outside of the [place]? No        Today's PHQ-2 Score:       4/17/2024     7:23 AM   PHQ-2 ( 1999 [employer])   Q1: Little interest or pleasure in doing things 0   Q2: Feeling down, depressed or hopeless 0   PHQ-2 Score 0   Q1: Little interest or pleasure in doing things Not at all   Q2: Feeling down, depressed or hopeless Not at all   PHQ-2 Score 0           4/17/2024   Substance Use   Alcohol more than 3/day or more than 7/wk No   Do you have a current opioid prescription? No   How severe/bad is pain from 1 to 10? 0/10 (No Pain)   Do you use any other substances recreationally? No     Social History     Tobacco Use    Smoking status: Some Days     Current packs/day: 0.50     Average packs/day: 0.5 packs/day for 50.0 years (25.0 ttl pk-yrs)     Types: Cigarettes    Smokeless tobacco: Never   Substance Use Topics    Alcohol use: Yes     Comment: 2 cases per week     Drug use: No         4/17/2024   AAA Screening   Family history of Abdominal Aortic Aneurysm (AAA)? No   Last PSA:   PSA   Date Value Ref Range Status   07/29/2020 1.18 0 - 4 ug/L Final     Comment:     Assay Method:  Chemiluminescence using Siemens Vista analyzer     Prostate Specific Antigen Screen   Date Value Ref Range Status   02/06/2023 0.86 0.00 - 4.50 ng/mL Final   10/22/2021 1.32 0.00 - 4.00 ug/L Final     ASCVD Risk   The ASCVD Risk score (Kd DK, et al., 2019) failed to calculate for the following reasons:    The patient has a prior MI or stroke diagnosis      Reviewed and updated as needed this visit by Provider                    Lab work is in process  Current providers sharing in care for this patient include:  Patient Care Team:  Josias Ch MD as PCP - General (Internal Medicine)  Josias Ch MD as Assigned PCP    The following health maintenance items are reviewed in Epic and correct as of today:  Health Maintenance   Topic Date Due    DTAP/TDAP/TD IMMUNIZATION (1 - Tdap) 07/02/2006    ZOSTER IMMUNIZATION (1 of 2) Never done    RSV VACCINE  "(Pregnancy & 60+) (1 - 1-dose 60+ series) Never done    ANNUAL REVIEW OF HM ORDERS  06/28/2022    INFLUENZA VACCINE (1) 09/01/2023    COVID-19 Vaccine (5 - 2023-24 season) 09/01/2023    MEDICARE ANNUAL WELLNESS VISIT  02/06/2024    LIPID  02/06/2024    LUNG CANCER SCREENING  02/10/2024    FALL RISK ASSESSMENT  04/17/2025    GLUCOSE  02/06/2026    COLORECTAL CANCER SCREENING  11/01/2026    ADVANCE CARE PLANNING  02/06/2028    PHQ-2 (once per calendar year)  Completed    Pneumococcal Vaccine: 65+ Years  Completed    IPV IMMUNIZATION  Aged Out    HPV IMMUNIZATION  Aged Out    MENINGITIS IMMUNIZATION  Aged Out    RSV MONOCLONAL ANTIBODY  Aged Out    HEPATITIS C SCREENING  Discontinued         Review of Systems  CONSTITUTIONAL: NEGATIVE for fever, chills, change in weight  INTEGUMENTARY/SKIN: NEGATIVE for worrisome rashes, moles or lesions  EYES: NEGATIVE for vision changes or irritation  ENT/MOUTH: NEGATIVE for ear, mouth and throat problems  RESP: NEGATIVE for significant cough or SOB  CV: NEGATIVE for chest pain, palpitations or peripheral edema  GI: NEGATIVE for nausea, abdominal pain, heartburn, or change in bowel habits  : NEGATIVE for frequency, dysuria, or hematuria  MUSCULOSKELETAL: NEGATIVE for significant arthralgias or myalgia  NEURO: NEGATIVE for weakness, dizziness or paresthesias  ENDOCRINE: NEGATIVE for temperature intolerance, skin/hair changes  HEME: NEGATIVE for bleeding problems  PSYCHIATRIC: NEGATIVE for changes in mood or affect     Objective    Exam  /68   Pulse 62   Temp 97.7  F (36.5  C) (Temporal)   Resp 16   Ht 1.765 m (5' 9.5\")   Wt 69.9 kg (154 lb)   SpO2 97%   BMI 22.42 kg/m     Estimated body mass index is 22.42 kg/m  as calculated from the following:    Height as of this encounter: 1.765 m (5' 9.5\").    Weight as of this encounter: 69.9 kg (154 lb).    Physical Exam  GENERAL: alert and no distress  EYES: Eyes grossly normal to inspection, PERRL and conjunctivae and sclerae " normal  HENT: ear canals and TM's normal, nose and mouth without ulcers or lesions  NECK: no adenopathy, no asymmetry, masses, or scars  RESP: lungs clear to auscultation - no rales, rhonchi or wheezes  CV: regular rate and rhythm, normal S1 S2, no S3 or S4, no murmur, click or rub, no peripheral edema  ABDOMEN: soft, nontender, no hepatosplenomegaly, no masses and bowel sounds normal  MS: mild soreness on the anterior shins   SKIN: no suspicious lesions or rashes  NEURO: Normal strength and tone, mentation intact and speech normal  PSYCH: mentation appears normal, affect normal/bright        4/17/2024   Mini Cog   Clock Draw Score 0 Abnormal   3 Item Recall 3 objects recalled   Mini Cog Total Score 3        Signed Electronically by: Josias Ch MDng Cancer Screening Shared Decision Making Visit     Isabel Alvarez, a 68 year old male, is eligible for lung cancer screening    History   Smoking Status    Some Days    Types: Cigarettes   Smokeless Tobacco    Never       I have discussed with patient the risks and benefits of screening for lung cancer with low-dose CT.     The risks include:    radiation exposure: one low dose chest CT has as much ionizing radiation as about 15 chest x-rays, or 6 months of background radiation living in [place]      false positives: most findings/nodules are NOT cancer, but some might still require additional diagnostic evaluation, including biopsy    over-diagnosis: some slow growing cancers that might never have been clinically significant will be detected and treated unnecessarily     The benefit of early detection of lung cancer is contingent upon adherence to annual screening or more frequent follow up if indicated.     Furthermore, to benefit from screening, Isabel must be willing and able to undergo diagnostic procedures, if indicated. Although no specific guide is available for determining severity of comorbidities, it is reasonable to withhold screening in patients  who have greater mortality risk from other diseases.     We did discuss that the best way to prevent lung cancer is to not smoke.    Some patients may value a numeric estimation of lung cancer risk when evaluating if lung cancer screening is right for them, here is one calculator:    ShouldIScreen

## 2024-04-17 NOTE — PATIENT INSTRUCTIONS
Lung Cancer Screening   Frequently Asked Questions  If you are at high-risk for lung cancer, getting screened with low-dose computed tomography (LDCT) every year can help save your life. This handout offers answers to some of the most common questions about lung cancer screening. If you have other questions, please call [phone] ([phone]).     What is it?  Lung cancer screening uses special X-ray technology to create an image of your lung tissue. The exam is quick and easy and takes less than 10 seconds. We don t give you any medicine or use any needles. You can eat before and after the exam. You don t need to change your clothes as long as the clothing on your chest doesn t contain metal. But, you do need to be able to hold your breath for at least 6 seconds during the exam.    What is the goal of lung cancer screening?  The goal of lung cancer screening is to save lives. Many times, lung cancer is not found until a person starts having physical symptoms. Lung cancer screening can help detect lung cancer in the earliest stages when it may be easier to treat.    Who should be screened for lung cancer?  We suggest lung cancer screening for anyone who is at high-risk for lung cancer. You are in the high-risk group if you:      are between the ages of 55 and 79, and    have smoked at least 1 pack of cigarettes a day for 20 or more years, and    still smoke or have quit within the past 15 years.    However, if you have a new cough or shortness of breath, you should talk to your doctor before being screened.    Why does it matter if I have symptoms?  Certain symptoms can be a sign that you have a condition in your lungs that should be checked and treated by your doctor. These symptoms include fever, chest pain, a new or changing cough, shortness of breath that you have never felt before, coughing up blood or unexplained weight loss. Having any of these symptoms can greatly affect the results of lung  cancer screening.       Should all smokers get an LDCT lung cancer screening exam?  It depends. Lung cancer screening is for a very specific group of men and women who have a history of heavy smoking over a long period of time (see  Who should be screened for lung cancer  above).  I am in the high-risk group, but have been diagnosed with cancer in the past. Is LDCT lung cancer screening right for me?  In some cases, you should not have LDCT lung screening, such as when your doctor is already following your cancer with CT scan studies. Your doctor will help you decide if LDCT lung screening is right for you.  Do I need to have a screening exam every year?  Yes. If you are in the high-risk group described earlier, you should get an LDCT lung cancer screening exam every year until you are 79, or are no longer willing or able to undergo screening and possible procedures to diagnose and treat lung cancer.  How effective is LDCT at preventing death from lung cancer?  Studies have shown that LDCT lung cancer screening can lower the risk of death from lung cancer by 20 percent in people who are at high-risk.  What are the risks?  There are some risks and limitations of LDCT lung cancer screening. We want to make sure you understand the risks and benefits, so please let us know if you have any questions. Your doctor may want to talk with you more about these risks.    Radiation exposure: As with any exam that uses radiation, there is a very small increased risk of cancer. The amount of radiation in LDCT is small--about the same amount a person would get from a mammogram. Your doctor orders the exam when he or she feels the potential benefits outweigh the risks.    False negatives: No test is perfect, including LDCT. It is possible that you may have a medical condition, including lung cancer, that is not found during your exam. This is called a false negative result.    False positives and more testing: LDCT very often finds  something in the lung that could be cancer, but in fact is not. This is called a false positive result. False positive tests often cause anxiety. To make sure these findings are not cancer, you may need to have more tests. These tests will be done only if you give us permission. Sometimes patients need a treatment that can have side effects, such as a biopsy. For more information on false positives, see  What can I expect from the results?     Findings not related to lung cancer: Your LDCT exam also takes pictures of areas of your body next to your lungs. In a very small number of cases, the CT scan will show an abnormal finding in one of these areas, such as your kidneys, adrenal glands, liver or thyroid. This finding may not be serious, but you may need more tests. Your doctor can help you decide what other tests you may need, if any.  What can I expect from the results?  About 1 out of 4 LDCT exams will find something that may need more tests. Most of the time, these findings are lung nodules. Lung nodules are very small collections of tissue in the lung. These nodules are very common, and the vast majority--more than 97 percent--are not cancer (benign). Most are normal lymph nodes or small areas of scarring from past infections.  But, if a small lung nodule is found to be cancer, the cancer can be cured more than 90 percent of the time. To know if the nodule is cancer, we may need to get more images before your next yearly screening exam. If the nodule has suspicious features (for example, it is large, has an odd shape or grows over time), we will refer you to a specialist for further testing.  Will my doctor also get the results?  Yes. Your doctor will get a copy of your results.  Is it okay to keep smoking now that there s a cancer screening exam?  No. Tobacco is one of the strongest cancer-causing agents. It causes not only lung cancer, but other cancers and cardiovascular (heart) diseases as well. The damage  caused by smoking builds over time. This means that the longer you smoke, the higher your risk of disease. While it is never too late to quit, the sooner you quit, the better.  Where can I find help to quit smoking?  The best way to prevent lung cancer is to stop smoking. If you have already quit smoking, congratulations and keep it up! For help on quitting smoking, please call [employer] at 4-066-QUITNOW (1-803.762.5060) or the American Cancer Society at 1-629.541.9192 to find local resources near you.  One-on-one health coaching:  If you d prefer to work individually with a health care provider on tobacco cessation, we offer:      Medication Therapy Management:  Our specially trained pharmacists work closely with you and your doctor to help you quit smoking.  Call [phone] or [phone] (toll free).

## 2024-04-17 NOTE — COMMUNITY RESOURCES LIST (ENGLISH)
April 17, 2024           YOUR PERSONALIZED LIST OF SERVICES & PROGRAMS           & RECREATION    [place] - Sports clubs and recreational activities - [address] [place] [address] (Distance: 11.6 miles)  Language: English  Fee: Self pay, Sliding scale      [place] - Sports clubs and recreational activities - [facility]  [address] [place] [address] (Distance: 23.3 miles)  Language: English  Fee: Self pay  Accessibility: [place] accessible      [facility] - Adult Enrichment  Phone: [phone]  Website: [email or web address]-[email or web address]  Language: English  Hours: Mon 7:30 AM - 4:00 PM Tue 7:30 AM - 4:00 PM Wed 7:30 AM - 4:00 PM Thu 7:30 AM - 4:00 PM Fri 7:30 AM - 4:00 PM    Classes/Groups      [facility] - Group fitness classes - [address] [place] [address] (Distance: 11.6 miles)  Language: English  Fee: Free, Self pay, Sliding scale      [employer] - Sit and Be Fit/[employer]  [address] [place] [address] (Distance: 24.7 miles)  Website: https://physicaltherapyptNicePeopleAtWork/programs/sit-and-be-fit/  Language: English  Fee: Free      [facility] - Adult Enrichment  Phone: [phone]  Website: [email or web address]  Language: English  Hours: Mon 7:30 AM - 4:00 PM Tue 7:30 AM - 4:00 PM Wed 7:30 AM - 4:00 PM Thu 7:30 AM - 4:00 PM Fri 7:30 AM - 4:00 PM               IMPORTANT NUMBERS & WEBSITES        Emergency Services  911  .   United Way  211 http://211unitedway.org  .   Poison Control  (625) 672-8397 http://[place]poison.org http://[place]poison.org  .     Suicide and Crisis Lifeline  988 http://988Valley Healthline.org  .   Childhelp [place] Child Abuse Hotline  958.304.9598 http://Childhelphotline.org   .   National  Sexual Assault Hotline  (507) 707-8267 (HOPE) http://[employer] Networksn.NeuroSky   .     National Runaway Safeline  (394) 787-3153 (RUNAWAY) http://Ensa.NeuroSky  .   Pregnancy & Postpartum Support  Call/text 801-473-5158  [place]: http://ppsupportmn.org  [place]: http://psichapters.com/wi  .   Substance Abuse National Helpline ([facility])  789-280-HELP (6995) http://Findtreatment.gov   .                DISCLAIMER: These resources have been generated via the [employer]. [employer] does not endorse any service providers mentioned in this resource list. [employer] does not guarantee that the services mentioned in this resource list will be available to you or will improve your health or wellness.    [facility]

## 2024-04-26 NOTE — TELEPHONE ENCOUNTER
----- Message from Josias Ch MD sent at 4/26/2024 12:44 PM CDT -----  Please call and let him know that his CT scan is okay no signs of lung cancer.  There are some calcifications of his coronary arteries.  Best treatment is to continue his lisinopril, metoprolol, pravastatin.

## 2024-08-15 NOTE — TELEPHONE ENCOUNTER
"Nurse Triage SBAR    Is this a 2nd Level Triage? YES, LICENSED PRACTITIONER REVIEW IS REQUIRED    Situation:  patient has a rash all over his body for 1 week. He also has swelling in both his leg up to his calves. He states they are \"leaking now\" for last 3 days.    Background: he tried some anti-itch cream for his rash and this is not helping much.    Assessment: he denies chest pain, difficulty breathing.  No fever.  He states he has blood blisters on his arms that he has had for a long time.  No redness on his legs.  No new soaps    Protocol Recommended Disposition:   Go To ED/UCC Now (Or To Office With PCP Approval)    Recommendation: per protocol patient advised to be seen today. Patient is wondering if he can get in to be seen tomorrow in [place].     Routed to provider    Leny Bustos RN on 8/15/2024 at 4:38 PM      .    Reason for Disposition   SEVERE swelling (e.g., swelling extends above knee, entire leg is swollen, weeping fluid)    Additional Information   Negative: Sounds like a life-threatening emergency to the triager   Negative: Chest pain   Negative: Followed an insect bite and has localized swelling (e.g., small area of puffy or swollen skin)   Negative: Followed a knee injury   Negative: Ankle or foot injury   Negative: Pregnant with leg swelling or edema   Negative: Difficulty breathing at rest   Negative: Entire foot is cool or blue in comparison to other side    Protocols used: Leg Swelling and Edema-A-OH    "

## 2024-08-15 NOTE — TELEPHONE ENCOUNTER
Patient offered an appointment for tomorrow and he would prefer to be seen Monday. Appointment made.     He states he will go in if his symptoms increase.  Leny Bustos RN on 8/15/2024 at 5:50 PM

## 2024-08-15 NOTE — TELEPHONE ENCOUNTER
Provider Response to 2nd Level Triage Request    I have reviewed the RN documentation. My recommendation is:  Face To Face Visit. Next Day: place patient on my schedule, as I have availability  Eliu Landeros MD

## 2024-08-19 PROBLEM — E83.42 HYPOMAGNESEMIA: Status: ACTIVE | Noted: 2024-01-01

## 2024-08-19 PROBLEM — L03.116 CELLULITIS OF LEFT LOWER EXTREMITY: Status: ACTIVE | Noted: 2024-01-01

## 2024-08-19 PROBLEM — F10.29 ALCOHOL DEPENDENCE WITH UNSPECIFIED ALCOHOL-INDUCED DISORDER (H): Status: ACTIVE | Noted: 2024-01-01

## 2024-08-19 PROBLEM — K21.9 GASTROESOPHAGEAL REFLUX DISEASE WITHOUT ESOPHAGITIS: Status: ACTIVE | Noted: 2024-01-01

## 2024-08-19 PROBLEM — D69.6 THROMBOCYTOPENIA (H): Status: ACTIVE | Noted: 2024-01-01

## 2024-08-19 PROBLEM — R60.1 ANASARCA: Status: ACTIVE | Noted: 2024-01-01

## 2024-08-19 PROBLEM — Z72.0 TOBACCO ABUSE DISORDER: Status: ACTIVE | Noted: 2017-12-15

## 2024-08-19 PROBLEM — R79.89 ELEVATED LFTS: Status: ACTIVE | Noted: 2024-01-01

## 2024-08-19 PROBLEM — D64.9 ANEMIA: Status: ACTIVE | Noted: 2024-01-01

## 2024-08-19 PROBLEM — E87.1 HYPONATREMIA: Status: ACTIVE | Noted: 2024-01-01

## 2024-08-19 PROBLEM — R60.9 EDEMA, UNSPECIFIED TYPE: Status: ACTIVE | Noted: 2024-01-01

## 2024-08-19 PROBLEM — R79.1 ELEVATED INR: Status: ACTIVE | Noted: 2024-01-01

## 2024-08-19 NOTE — RESULT ENCOUNTER NOTE
Team - please call patient with results. Thank you!    Your sodium is dangerously low.  I recommend that you go to the ER immediately.  Calcium is low and blood sugar slightly elevated.  BNP (heart failure lab) is normal.  Your complete blood count is abnormal and your hemoglobin is low.  White blood cell count was normal.

## 2024-08-19 NOTE — H&P
[facility]    History and Physical - Hospitalist Service       Date of Admission:  8/19/2024    Assessment & Plan      Isabel Alvarez is a 68 year old male with PMHx of HTN, hyperlipidemia, alcohol dependence with previous DUI on monitoring program but with ongoing regular alcohol use, tobacco use disorder, hypomagnesia and chronic hyponatremia (baseline sodium 126-128) who presented to the clinic this morning with concerns for worsening lower leg edema and new cellulitis.  Labs work was performed and patient was discharged home with start of lasix BID and doxycycline but before the patient could start either of these medications he was instructed to presented to ED for sodium of 120 and need for correction.  Work up has revealed concern for anasarca likely secondary to cirrhosis development with overlying left lower leg cellulitis due to skin breakdown from edema with worsening hyponatremia suspected secondary to anasarca degree present.  Patient will be registered to observation status with ongoing diuresis efforts, IV antibiotics and further evaluation of suspected liver impairment.      Principal Problem:    Acute on Chronic Hyponatremia    Assessment: Patient's baseline sodium appears to be 126-128 over the past few years but no outpatient work up as to etiology appears to have been performed.  Patient does have known alcohol dependence and is also on lisinopril which both may contribute.  Currently patient's sodium has decreased to 120 in context of apparent volume overload/anasarca in patient 18 lbs up with pitting edema up to lower abdomen and concern for underlying liver impairment.      Plan:   -register to observation status  -recheck sodium now to see impact on Lasix administered by ED provider at 1100  -have asked ED provider to order abdominal ultrasound to evaluate for ascites and liver appearance as cirrhosis is strongly suspected given presentation and lab  work  -perform UA when able and will also obtain urine sodium and urine osmolality for baseline  -Lasix 60 mg every 6 hours planned (will re-evaluate if sodium is already increasing on blood work above)  -sodium every 4 hours  -consider paracentesis if significant ascites is present.   -monitor closely for improvement in edema/anasarca  -patient does not have to have all of extra weight off prior to discharge but needs to have sodium stabilizing in previous normal range and good outpatient plan for close follow up and ongoing diuresis     Active Problems:    Anasarca    Assessment: patient has been gaining weight slowing over the past 2-3 months and has pitting edema up to his lower abdominal wall, concern for ascites on abdominal exam as well.  Given history and current lab work, concern is for developing liver disease from prolonged alcohol use.  BNP is negative, chest x-ray is clear making cardiac disease less likely, especially given patient's clinical history     Plan:   -proceed with lasix 60 mg IV every 6 hours  -strict I/O  -fluid restriction of 2,000 mL  -daily weights  -perform abdominal ultrasound with consideration of paracentesis if ascites is present   -continue liver work up as below  -close outpatient follow up with PCP      Cellulitis of left lower extremity    Assessment: developing in the past few days with notably warmth, tenderness, swelling compared to right lower leg. Patient did get a prescription for doxycycline at clinic appointment but has not yet started it.  Patient did get one dose of Ancef in the ED.  WBC normal.  No signs of SIRS or sepsis     Plan:   -will continue with Ancef during this observation stay  -perform MRSA swab and consider broadening to include MRSA coverage if positive  -anticipate transition to oral cephalosporin at time of discharge unless MRSA is positive and then doxycycline may be appropriate choice       Hypomagnesemia    Assessment: magnesium is 1.2 on  presentation.  On review, patient has had known low magnesium levels in the past and is supposed to be taking daily Mag ox supplementation 400 mg daily but patient is unsure if he has been taking recently     Plan:   -will start high replacement magnesium supplementation protocol during this observation stay and anticipate ongoing PO supplementation at time of discharge       Elevated LFTs    Elevated INR    Thrombocytopenia (H24)    Anemia    Assessment: patient has elevated AST (2:1 ratio with ALT), total bilirubin (2.9), INR (1.46) and not on blood thinners, and new thrombocytopenia of 108 and anemia of 10.7 in patient with frequent heavy alcohol use, limited only by Intoxa[employer] testing required by law due to previous DUI.  Now has new anasarca/weight gain in combination with this constellations of lab abnormalities concerning for liver disease such as cirrhosis or alcoholic hepatitis.      Plan:   -proceed with liver work up as above  -continue with diuresis efforts as above  -retest CMP, INR, CBC tomorrow for ongoing close monitoring  -will need outpatient follow up with PCP in near future for further monitoring       Alcohol dependence with unspecified alcohol-induced disorder (H)    Assessment: patient had a DWI approximatley 10 years ago and reports still being in a monitoring program following this.  His next testing is tomorrow morning (he has to be in [place] at the testing site between 9-11 am) and he reports he cannot miss this appointment or his program monitoring would be extended another 3 years.  His last drink was yesterday at noon as he has learned when to stop drinking in order to have a negative test during these appointments.  He reports he drinks between 3-12 beers a day on a days he can drink and it varies from week to week how many days he can drink depending on how often he needs to test.  He does not feel that his drinking is a problem for him and has no desire to quit at this time.      Plan:   -will proceed with liver work up as above with concern for alcohol induced liver disease such as cirrhosis  -monitor for any symptoms of withdrawal during this hospitalization stay  -patient will need close outpatient monitoring and extensive conversations going forward about his alcohol use and his options as patient will allow.       Benign essential hypertension    Assessment: patient is on lisinopril 20 mg daily and Toprol  mg daily.  Blood pressures are low normal to normal currently    Plan:   -will hold lisinopril and Toprol XL currently given need for ongoing aggressive diuresis       Hyperlipidemia LDL goal <100    Assessment: on pravastatin 40 mg daily    Plan:   -hold for now while liver work up is in process but anticipate restarting at time of discharge.       Tobacco abuse disorder    Assessment: patient currently is smoking approximately 10 cigarettes a day.  Has rash allergy to the nicotine patches, cannot chew gum due to his dentures but is able to use the lozenges for cravings without side effects.  He has been on Chantix with good results but co-pay was too expensive so he just went back to cigarettes again because it was cheaper.      Plan:   -will allow nicotine lozenges as needed for cravings during this stay   -patient is not interested in quitting at this time      Gastroesophageal reflux disease without esophagitis    Assessment: patient uses his omeprazole every other day    Plan:   -transition to Protonix during this hospital stay          Diet:  Low salt diet  DVT Prophylaxis: Ambulate every shift (cannot use SCDs with cellulitis and given elevated INR at baseline am hesitant to use blood thinner)  Lizarraga Catheter: Not present  Lines: None     Cardiac Monitoring: None  Code Status:  Full code     Clinically Significant Risk Factors Present on Admission         # Hyponatremia: Lowest Na = 120 mmol/L in last 2 days, will monitor as appropriate    # Hypomagnesemia: Lowest Mg =  "1.2 mg/dL in last 2 days, will replace as needed   # Hypoalbuminemia: Lowest albumin = 2.9 g/dL at 8/19/2024  8:28 AM, will monitor as appropriate  # Coagulation Defect: INR = 1.46 (Ref range: 0.85 - 1.15) and/or PTT = N/A, will monitor for bleeding  # Thrombocytopenia: Lowest platelets = 108 in last 2 days, will monitor for bleeding   # Hypertension: Noted on problem list    # Anemia: based on hgb <11                        Disposition Plan     Medically Ready for Discharge: Anticipated Tomorrow           Meghana Chi MD  Hospitalist Service  [facility]  Securely message with [employer] (more info)  Text page via [employer] Paging/Directory     ______________________________________________________________________    Chief Complaint   \"They told me my sodium was too low and I had to come in.    History is obtained from the patient    History of Present Illness   Isabel Alvarez is a 68 year old male with PMHx of HTN, hyperlipidemia, alcohol dependence with previous DUI, tobacco use disorder, chronic hyponatremia (126-128), GERD, and hypomagnesia who presented to the clinic today to see the same day provider for worsening rash and swelling and was found to have signs of fluid overload concerning for liver vs heart and left lower leg cellulitis.  Labs were performed and patient was discharged with Lasix 20 mg daily and Doxycycline x10 days with plan for chest x-ray, echocardiogram and further outpatient work up.  Sodium returned at 120 along with numerous other lab abnormalities and patient was instructed to go to the ED for further evaluation.  The patient had not yet started the Lasix or Doxycycline yet.  Patient reports feeling overall okay and is disappointed he needs to stay overnight and reports he MUST leave tomorrow morning by 10 AM to get to his next Intox[employer] testing and is very focused on not missing that appointment.  He will be registered to observation status with " plans as above.       Past Medical History    No past medical history on file.    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 Taking?   EPINEPHrine (ANY BX GENERIC EQUIV) 0.3 MG/0.3ML injection 2-pack   No No   Sig: Inject 0.3 mLs (0.3 mg) into the muscle as needed for anaphylaxis   clotrimazole-betamethasone (LOTRISONE) 1-0.05 % external lotion   No No   Sig: Apply topically 2 times daily   Patient not taking: Reported on 8/19/2024   diclofenac (VOLTAREN) 1 % topical gel   No No   Sig: Apply 4 g topically 4 times daily   doxycycline hyclate (VIBRAMYCIN) 100 MG capsule   No No   Sig: Take 1 capsule (100 mg) by mouth 2 times daily for 10 days   furosemide (LASIX) 20 MG tablet   No No   Sig: Take 1 tablet (20 mg) by mouth daily for 7 days   lisinopril (ZESTRIL) 20 MG tablet   No No   Sig: Take 1 tablet (20 mg) by mouth daily   magnesium oxide (MAG-OX) 400 MG tablet   No No   Sig: Take 1 tablet (400 mg) by mouth daily   metoprolol succinate ER (TOPROL XL) 200 MG 24 hr tablet   No No   Sig: Take 1 tablet (200 mg) by mouth daily   omeprazole (PRILOSEC) 40 MG DR capsule   No No   Sig: TAKE 1 CAPSULE BY MOUTH ONCE DAILY TAKE  30-60  MINUTES  BEFORE  A  MEAL   pravastatin (PRAVACHOL) 40 MG tablet   No No   Sig: Take 1 tablet (40 mg) by mouth daily      Facility-Administered Medications: None        Social History   I have reviewed this patient's social history and updated it with pertinent information if needed.  Social History     Tobacco Use    Smoking status: Some Days     Current packs/day: 0.50     Average packs/day: 0.5 packs/day for 50.0 years (25.0 ttl pk-yrs)     Types: Cigarettes    Smokeless  tobacco: Never   Vaping Use    Vaping status: Never Used   Substance Use Topics    Alcohol use: Yes     Comment: 3-13 drinks a day most days of the week    Drug use: No        Physical Exam   Vital Signs: Temp: 97.7  F (36.5  C) Temp src: Oral BP: 100/62 Pulse: 72   Resp: 26 SpO2: 98 % O2 Device: None (Room air)    Weight: 173 lbs 1.6 oz    Constitutional: awake, alert, cooperative, no apparent distress, and appears stated age  Respiratory: No increased work of breathing, good air exchange, patient has expiratory sounds (not crackles or wheezes) however when one stethoscope was removed from this providers ears patient was audibly forcefully breathing with audible noise being heard (moan/wheeze) but was not present any other time of this evaluation or interview.   Cardiovascular: RRR  GI: bowel sounds hypoactive, abdomen is obese and distended but soft, no tenderness.  Unable to elicit a fluid wave on exam   Skin: patient has mild erythema in bilateral lower legs but is much worse in the left foot and shin region with erythema, warmth, increased swelling and tenderness of the foot more than the shin.    Musculoskeletal: patient has 2-3+ pitting edema in bilateral lower legs, trace pitting edema in bilateral thighs and lower abdomen wall but not up to umbilicus   Neurologic: Awake, alert, oriented to name, place and overall to situation but patient is already downplaying his alcohol use and its possible role in current medical condition     Medical Decision Making       65 MINUTES SPENT BY ME on the date of service doing chart review, history, exam, documentation & further activities per the note.      Data     I have personally reviewed the following data over the past 24 hrs:    5.5  \   10.7 (L)   / 108 (L)     120 (L) 87 (L) 8.9 /  100 (H)   4.9 19 (L) 0.85 \     ALT: 30 AST: 62 (H) AP: 161 (H) TBILI: 2.9 (H)   ALB: 2.9 (L) TOT PROTEIN: 6.4 LIPASE: N/A     Trop: N/A BNP: 603     INR:  1.46 (H) PTT:  N/A   D-dimer:   N/A Fibrinogen:  N/A       Imaging results reviewed over the past 24 hrs:   Recent Results (from the past 24 hour(s))   XR Chest 2 Views    Narrative    XR CHEST 2 VIEWS  8/19/2024 8:24 AM       INDICATION: Rash, edema  COMPARISON: 4/26/2024       Impression    IMPRESSION: Negative chest.    JOHNNIE THORNTON MD         SYSTEM ID:  [id number]

## 2024-08-19 NOTE — ED NOTES
Pt provided with observation handout. Pt agreeable to admission. Pt denies withdrawal symptoms at this time. Pt states he drinks 3-4 12 oz beers/day. Pt states he has gone through withdrawals previously. No other requests at this time.

## 2024-08-19 NOTE — ED PROVIDER NOTES
"  History     Chief Complaint   Patient presents with    Shortness of Breath    Leg Swelling     HPI  History per clinic, medical records, patient    This is a 68-year-old male who was sent from clinic today for concerns of hyponatremia.  Patient presented to the class concerned for lower extremity swelling/edema and a red itching rash to his lower extremity/\"all over\".  Per clinic review, patient was complaining about a rash all over his body for about a week, swelling of both of his legs to the calves which are \"now leaking\" for the last 3 days.  He has been using some topical anti-itch cream.  No fevers or chills.  No chest pain, shortness of breath, cold or cough symptoms.  No obvious exposures.  Patient also notes some tenderness in the right upper quadrant and under is right rib cage.    In clinic, he had labs done, chest x-ray.  Chest x-ray did not show any obvious acute abnormal findings.  Labs however were abnormal with sodium 120, chloride 86, calcium 8.5, glucose 107.  Magnesium low at 1.2.  Normal BNP.  CBC with normal white count at 5.5.  Hemoglobin low at 10.7.  Platelets 108.  LFTs abnormal with albumin 2.9.  Total bilirubin 2.9.  Alk phosphatase 161.  AST 62.  Normal ALT.  Direct bilirubin 1.15.    Because of severe hyponatremia, patient sent to ED for further evaluation and management.    Patient acknowledges drinking \"3-4\" beers a day but then also notes he sometimes will drink 10-12 beers on the weekends.  He smokes daily.  He states that he initially lost weight from 190 pounds down to 154 pounds about 4 to 5 months ago but has since gained weight.  He describes a \"beer belly\".  He denies pain in the lower extremity except for his left second toe which he hit on a door jam.    Allergies:  Allergies   Allergen Reactions    Hctz [Hydrochlorothiazide] Rash    Penicillins Hives    Roxicet [Oxycodone-Acetaminophen] Hives       Problem List:    Patient Active Problem List    Diagnosis Date Noted    " Alcohol dependence with unspecified alcohol-induced disorder (H) 08/19/2024     Priority: Medium    Edema, unspecified type 08/19/2024     Priority: Medium    Hypomagnesemia 08/19/2024     Priority: Medium    Hyponatremia 08/19/2024     Priority: Medium    Elevated LFTs 08/19/2024     Priority: Medium    Cellulitis of left lower extremity 08/19/2024     Priority: Medium    Anasarca 08/19/2024     Priority: Medium    Elevated INR 08/19/2024     Priority: Medium    Thrombocytopenia (H24) 08/19/2024     Priority: Medium    Anemia 08/19/2024     Priority: Medium    Gastroesophageal reflux disease without esophagitis 08/19/2024     Priority: Medium    Tobacco abuse disorder 12/15/2017     Priority: Medium    Blind left eye 06/23/2017     Priority: Medium    Dupuytren's contracture of hand 06/23/2017     Priority: Medium    Benign essential hypertension 06/23/2017     Priority: Medium    Hyperlipidemia LDL goal <100 06/23/2017     Priority: Medium    Old myocardial infarction 06/23/2017     Priority: Medium        Past Medical History:    No past medical history on file.    Past Surgical History:    Past Surgical History:   Procedure Laterality Date    RELEASE DUPUYTRENS CONTRACTURE Right 11/12/2021    Procedure: RELEASE, DUPUYTREN CONTRACTURE, right ring finger;  Surgeon: Shubham Eng MD;  Location:  OR       Family History:    No family history on file.    Social History:  Marital Status:  Single [1]  Social History     Tobacco Use    Smoking status: Some Days     Current packs/day: 0.50     Average packs/day: 0.5 packs/day for 50.0 years (25.0 ttl pk-yrs)     Types: Cigarettes    Smokeless tobacco: Never   Vaping Use    Vaping status: Never Used   Substance Use Topics    Alcohol use: Yes     Comment: 2 a day    Drug use: No        Medications:    No current outpatient medications on file.        Review of Systems  All other ROS reviewed and are negative or non-contributory except as stated in HPI.  "    Physical Exam   BP: 139/73  Pulse: 70  Temp: 97.7  F (36.5  C)  Resp: 20  Height: 180.3 cm (5' 11\")  Weight: 78.5 kg (173 lb 1.6 oz)  SpO2: 100 %      Physical Exam  Vitals and nursing note reviewed.   Constitutional:       Appearance: He is normal weight.      Comments: Patient is awake, alert, sitting in the bed.  Thin.   HENT:      Nose: Nose normal.      Mouth/Throat:      Pharynx: Oropharynx is clear.      Comments: Tacky mucous membranes  Eyes:      General: No scleral icterus.     Extraocular Movements: Extraocular movements intact.      Conjunctiva/sclera: Conjunctivae normal.   Cardiovascular:      Rate and Rhythm: Normal rate and regular rhythm.      Pulses: Normal pulses.      Heart sounds: Normal heart sounds.   Pulmonary:      Comments: Coarse breath sounds with rhonchi  Abdominal:      Comments: Distended.  Nontender except for mild tenderness in the right upper quadrant.  I think he has ascites, I was unable to palpate his liver edge.   Musculoskeletal:      Cervical back: Normal range of motion and neck supple.      Comments: Significant bilateral lower extremity pitting edema to the knee.  The left foot with erythema, warmth on the dorsum of the foot.  Left second toe with swelling, erythema, ecchymosis.  He can wiggle the toes and has normal sensation.  Left lower leg with erythema, warmth, some clear oozing on the lower part of the leg   Skin:     Comments: Please see pictures of the left lower extremity below   Neurological:      General: No focal deficit present.      Mental Status: He is alert. Mental status is at baseline.   Psychiatric:         Mood and Affect: Mood normal.         Behavior: Behavior normal.                   ED Course (with Medical Decision Making)    Pt seen and examined by me.  RN and EPIC notes reviewed.       Patient presenting from clinic with lower extremity edema, oozing, hyponatremia.  He has a distended abdomen and I suspect ascites.  He acknowledges drinking " beer and I suspect he has fluid overload from this.  I am concerned that he has cellulitis of his left lower extremity, likely from the edema and the fluid overload.    I added an INR to his clinic labs.  This is 1.46.  Alcohol level 0.    I think he will respond to diuretics.  He was given 40 mg of IV Lasix.  However, I do not want to over correct his sodium into quick of a manner.  On review of medical records it appears that his baseline sodium is around 128.  I think it would be best if he was admitted to the hospitalist service for further management.  I spoke with hospitalist who graciously saw the patient in the ED and admitted him.  Requested ultrasound and this was ordered.    Abdominal ultrasound shows liver changes consistent with cirrhosis with moderate volume ascites.    Patient aware of the plan and he is stable in the ED.        Procedures  Results for orders placed or performed during the hospital encounter of 08/19/24 (from the past 24 hour(s))   INR   Result Value Ref Range    INR 1.46 (H) 0.85 - 1.15   Alcohol level blood   Result Value Ref Range    Alcohol ethyl <0.01 <=0.01 g/dL   Extra Tube    Narrative    The following orders were created for panel order Extra Tube.  Procedure                               Abnormality         Status                     ---------                               -----------         ------                     Extra Purple Top Tube[[id number]]                            Final result                 Please view results for these tests on the individual orders.   Extra Purple Top Tube   Result Value Ref Range    Hold Specimen [facility]    Basic metabolic panel   Result Value Ref Range    Sodium 120 (L) 135 - 145 mmol/L    Potassium 4.9 3.4 - 5.3 mmol/L    Chloride 87 (L) 98 - 107 mmol/L    Carbon Dioxide (CO2) 19 (L) 22 - 29 mmol/L    Anion Gap 14 7 - 15 mmol/L    Urea Nitrogen 8.9 8.0 - 23.0 mg/dL    Creatinine 0.85 0.67 - 1.17 mg/dL    GFR Estimate >90 >60 mL/min/1.73m2     Calcium 8.5 (L) 8.8 - 10.4 mg/dL    Glucose 100 (H) 70 - 99 mg/dL   Magnesium Lab   Result Value Ref Range    Magnesium 1.3 (L) 1.7 - 2.3 mg/dL   US Abdomen Limited    Narrative    US ABDOMEN LIMITED 8/19/2024 1:59 PM    CLINICAL HISTORY: New elevated LFTs, appearance of ascites  TECHNIQUE: Limited abdominal ultrasound.    COMPARISON: Chest CT 4/26/2024    FINDINGS:    GALLBLADDER: The gallbladder is normal. No gallstones, wall  thickening, or pericholecystic fluid. Negative sonographic Leblanc's  sign.    BILE DUCTS: There is no intrahepatic biliary dilatation. The common  duct measures 4 mm.    LIVER: Nodular contour of the liver with widening of the fissures.    RIGHT KIDNEY: No hydronephrosis.    PANCREAS: The pancreas is largely obscured by overlying gas.    Moderate ascites, not definitely visualized on prior chest CT.      Impression    IMPRESSION:  1.  Morphologic changes suggestive of cirrhosis with moderate volume  ascites.    PILO UP MD         SYSTEM ID:  [id number]   UA with Microscopic reflex to Culture    Specimen: Urine, Midstream   Result Value Ref Range    Color Urine Yellow Colorless, Straw, Light Yellow, Yellow    Appearance Urine Clear Clear    Glucose Urine Negative Negative mg/dL    Bilirubin Urine Negative Negative    Ketones Urine Negative Negative mg/dL    Specific Gravity Urine 1.006 1.003 - 1.035    Blood Urine Negative Negative    pH Urine 5.0 5.0 - 7.0    Protein Albumin Urine Negative Negative mg/dL    Urobilinogen Urine 4.0 (A) Normal, 2.0 mg/dL    Nitrite Urine Negative Negative    Leukocyte Esterase Urine Negative Negative    RBC Urine 0 <=2 /HPF    WBC Urine 0 <=5 /HPF    Squamous Epithelials Urine <1 <=1 /HPF    Hyaline Casts Urine 1 <=2 /LPF    Narrative    Urine Culture not indicated   Sodium random urine   Result Value Ref Range    Sodium Urine mmol/L 21 mmol/L   Sodium   Result Value Ref Range    Sodium 116 (LL) 135 - 145 mmol/L   Magnesium   Result Value Ref Range     Magnesium 2.0 1.7 - 2.3 mg/dL   Sodium   Result Value Ref Range    Sodium 116 (LL) 135 - 145 mmol/L   Sodium   Result Value Ref Range    Sodium 118 (LL) 135 - 145 mmol/L   Sodium   Result Value Ref Range    Sodium 117 (LL) 135 - 145 mmol/L   Sodium   Result Value Ref Range    Sodium 117 (LL) 135 - 145 mmol/L   Extra Tube    Narrative    The following orders were created for panel order Extra Tube.  Procedure                               Abnormality         Status                     ---------                               -----------         ------                     Extra Purple Top Tube[[id number]]                            In process                   Please view results for these tests on the individual orders.       Medications   lisinopril (ZESTRIL) tablet 20 mg ( Oral Automatically Held 8/25/24 0900)   magnesium oxide (MAG-OX) tablet 400 mg ( Oral Automatically Held 8/25/24 0900)   metoprolol succinate ER (TOPROL XL) 24 hr tablet 200 mg ( Oral Automatically Held 8/25/24 0900)   pantoprazole (PROTONIX) EC tablet 40 mg (40 mg Oral $Given 8/19/24 1546)   pravastatin (PRAVACHOL) tablet 40 mg ( Oral Automatically Held 8/25/24 0900)   ceFAZolin Sodium (ANCEF) injection 2 g (2 g Intravenous $Given 8/19/24 2211)   furosemide (LASIX) injection 60 mg (60 mg Intravenous Not Given 8/19/24 2141)   senna-docusate (SENOKOT-S/PERICOLACE) 8.6-50 MG per tablet 1 tablet (has no administration in time range)     Or   senna-docusate (SENOKOT-S/PERICOLACE) 8.6-50 MG per tablet 2 tablet (has no administration in time range)   ondansetron (ZOFRAN ODT) ODT tab 4 mg (has no administration in time range)     Or   ondansetron (ZOFRAN) injection 4 mg (has no administration in time range)   lidocaine 1 % 0.1-1 mL (has no administration in time range)   lidocaine (LMX4) kit (has no administration in time range)   sodium chloride (PF) 0.9% PF flush 3 mL (has no administration in time range)   sodium chloride (PF) 0.9% PF flush 3 mL (3 mLs  Intracatheter $Given 8/19/24 2211)   acetaminophen (TYLENOL) tablet 650 mg (has no administration in time range)     Or   acetaminophen (TYLENOL) Suppository 650 mg (has no administration in time range)   prochlorperazine (COMPAZINE) injection 5 mg (has no administration in time range)     Or   prochlorperazine (COMPAZINE) tablet 5 mg (has no administration in time range)     Or   prochlorperazine (COMPAZINE) suppository 12.5 mg (has no administration in time range)   nicotine (NICORETTE) gum 2 mg (has no administration in time range)   magnesium sulfate 4 g in 100 mL sterile water intermittent infusion (0 g Intravenous Stopped 8/19/24 1258)   furosemide (LASIX) injection 40 mg (40 mg Intravenous $Given 8/19/24 1100)   ceFAZolin Sodium (ANCEF) injection 2 g (2 g Intravenous $Given 8/19/24 1258)   magnesium oxide (MAG-OX) tablet 400 mg (400 mg Oral $Given 8/19/24 2210)   sodium chloride 3% BOLUS 50 mL (50 mLs Intravenous $New Bag 8/19/24 1800)       Assessments & Plan      I have reviewed the findings, diagnosis, plan and need for follow up with the patient.    Current Discharge Medication List          Final diagnoses:   Cellulitis of left lower extremity   Hyponatremia   Hypomagnesemia   Elevated LFTs   Alcoholic cirrhosis of liver with ascites (H)     Disposition: Patient admitted observation status    Note: Chart documentation done in part with Dragon Voice Recognition software. Although reviewed after completion, some word and grammatical errors may remain.   8/19/2024   [facility] EMERGENCY DEPT       Lisa Ruckre MD  08/20/24 010

## 2024-08-19 NOTE — PROGRESS NOTES
S-(situation): Patient registered to Observation. Patient arrived to room 269 via cart from ED    B-(background): Acute on Chronic Hyponatremia , Anasarca , Cellulitis of left lower extremity , Hypomagnesemia     A-(assessment): A and Ox4. Infrequent cough present. Lung sounds wheeze and coarse. On room air. Abdomen distended with rashes. BLE edema 3+ with rashes. SBA to bed from cart. No complaint of pain. VSS and afebrile.    R-(recommendations): Orders and observation goals reviewed with patient.    Nursing Observation criteria listed below was met:    Skin issues/needs documented:Yes  Isolation needs addressed and Signage up: NA  Fall Prevention: Education given and documented: NA  Education Assessment documented:Yes  Admission Education Documented: Yes  New medication patient education completed and documented (Possible Side Effects of Common Medications handout): Yes  OBS video/handout Reviewed & Documented: Yes  Allergies Reviewed: Yes  Medication Reconciliation Complete: Yes  Home medications if not able to send immediately home with family stored here: NA  Reminder note placed in discharge instructions of home meds: NA  Patient has discharge needs (If yes, please explain): No  Patient discharge preferences addressed and charted on white board:  Yes  Provider notified that patient has arrived to the unit: Yes

## 2024-08-19 NOTE — TELEPHONE ENCOUNTER
----- Message from Jyoti Stevens sent at 8/19/2024  9:13 AM CDT -----  Team - please call patient with results. Thank you!    Your chest x-ray is normal  Albumin is decreased and bilirubin, direct bilirubin, alk phos, and AST are elevated.  Please inform patient of these results via phone call.  I called patient personally and instructed him to go to the ER for hyponatremia.

## 2024-08-19 NOTE — MEDICATION SCRIBE - ADMISSION MEDICATION HISTORY
Medication Scribe Admission Medication History    Admission medication history is complete. The information provided in this note is only as accurate as the sources available at the time of the update.    Information Source(s): Patient via in-person    Pertinent Information: patient is aware that he was to start doxy & furosemide but has not picked up medications from pharmacy yet.     Changes made to PTA medication list:  Added: None  Deleted: Magnesium Oxide - not taking   Changed: None    Allergies reviewed with patient and updates made in EHR: yes; thinks he may have allergies/intolerance to 4th medication but cannot think of what is is     Medication History Completed By: MELANIE SWENSON 8/19/2024 2:26 PM    PTA Med List   Medication Sig Note Last Dose    clotrimazole-betamethasone (LOTRISONE) 1-0.05 % external lotion Apply topically 2 times daily 8/19/2024: Still has some; using it PRN  More than a month at PRN    diclofenac (VOLTAREN) 1 % topical gel Apply 4 g topically 4 times daily 8/19/2024: Buying OTC; using PRN  More than a month at PRN    EPINEPHrine (ANY BX GENERIC EQUIV) 0.3 MG/0.3ML injection 2-pack Inject 0.3 mLs (0.3 mg) into the muscle as needed for anaphylaxis 8/19/2024: Used: 8/2024 Past Week at PRN    lisinopril (ZESTRIL) 20 MG tablet Take 1 tablet (20 mg) by mouth daily  8/19/2024 at am    metoprolol succinate ER (TOPROL XL) 200 MG 24 hr tablet Take 1 tablet (200 mg) by mouth daily  8/19/2024 at am    omeprazole (PRILOSEC) 40 MG DR capsule TAKE 1 CAPSULE BY MOUTH ONCE DAILY TAKE  30-60  MINUTES  BEFORE  A  MEAL (Patient taking differently: Take 40 mg by mouth daily TAKE 1 CAPSULE BY MOUTH ONCE DAILY TAKE  30-60  MINUTES  BEFORE  A  MEAL)  8/19/2024 at am    pravastatin (PRAVACHOL) 40 MG tablet Take 1 tablet (40 mg) by mouth daily  8/19/2024 at am

## 2024-08-19 NOTE — PROGRESS NOTES
Writer, Dr. SHARON Chi and Jerilyn Weaver, RN-Charge, discussed about 3% saline bolus of 50 mL x1 now via PIV

## 2024-08-19 NOTE — PROGRESS NOTES
DATE/TIME OF CALL RECEIVED FROM LAB:  08/19/24 at 5:22 PM   LAB TEST:  Na  LAB VALUE:  116  PROVIDER NOTIFIED?: Yes  PROVIDER NAME: Dr. SHARON Chi  DATE/TIME LAB VALUE REPORTED TO PROVIDER: 8/19/2024, 1718H  MECHANISM OF PROVIDER NOTIFICATION: Page  PROVIDER RESPONSE: orders recieved

## 2024-08-19 NOTE — SIGNIFICANT EVENT
Significant Event Note    Time of event: 5:33 PM August 19, 2024    Description of event:  Sodium has decreased from 120 down to 116 following IV Lasix given in the ED.  Patient asymptomatic and baseline is normally 126-128 in recent years.  Second dose of IV lasix given a short while ago or ongoing hypervolemia/anasarca.      Plan:  -will give 3% saline bolus of 50 mL x1 now  -monitor sodium every 2 hours this evening  -Continue with aggressive IV Lasix diuresis as previously planned  -if sodium continues to decrease will need transfer into ICU for PICC line placement and 3% saline infusion along with aggressive diuresis.  -goal is sodium no higher than 122 by tomorrow morning labs (6 mmol in 12 hours) and nursing staff will inform if nearing that lori.      Discussed with: bedside nurse, charge nurse.      Meghana Chi MD

## 2024-08-19 NOTE — PROGRESS NOTES
Assessment & Plan     Rash  - BNP-N terminal pro; Future  - Basic metabolic panel  (Ca, Cl, CO2, Creat, Gluc, K, Na, BUN); Future  - XR Chest 2 Views; Future  - Echocardiogram Complete; Future  - CBC with platelets; Future  - furosemide (LASIX) 20 MG tablet; Take 1 tablet (20 mg) by mouth daily for 7 days  - Lymphedema Therapy  Referral; Future  - doxycycline hyclate (VIBRAMYCIN) 100 MG capsule; Take 1 capsule (100 mg) by mouth 2 times daily for 10 days    Edema, unspecified type  - BNP-N terminal pro; Future  - Basic metabolic panel  (Ca, Cl, CO2, Creat, Gluc, K, Na, BUN); Future  - XR Chest 2 Views; Future  - Echocardiogram Complete; Future  - CBC with platelets; Future  - furosemide (LASIX) 20 MG tablet; Take 1 tablet (20 mg) by mouth daily for 7 days  - Lymphedema Therapy  Referral; Future  - doxycycline hyclate (VIBRAMYCIN) 100 MG capsule; Take 1 capsule (100 mg) by mouth 2 times daily for 10 days    Other hypervolemia  - BNP-N terminal pro; Future  - Basic metabolic panel  (Ca, Cl, CO2, Creat, Gluc, K, Na, BUN); Future  - XR Chest 2 Views; Future  - Echocardiogram Complete; Future  - CBC with platelets; Future  - furosemide (LASIX) 20 MG tablet; Take 1 tablet (20 mg) by mouth daily for 7 days  - Lymphedema Therapy  Referral; Future  - doxycycline hyclate (VIBRAMYCIN) 100 MG capsule; Take 1 capsule (100 mg) by mouth 2 times daily for 10 days    Cellulitis, unspecified cellulitis site  - BNP-N terminal pro; Future  - Basic metabolic panel  (Ca, Cl, CO2, Creat, Gluc, K, Na, BUN); Future  - XR Chest 2 Views; Future  - Echocardiogram Complete; Future  - CBC with platelets; Future  - furosemide (LASIX) 20 MG tablet; Take 1 tablet (20 mg) by mouth daily for 7 days  - Lymphedema Therapy  Referral; Future  - doxycycline hyclate (VIBRAMYCIN) 100 MG capsule; Take 1 capsule (100 mg) by mouth 2 times daily for 10 days    Chronic obstructive pulmonary disease with (acute) exacerbation  "(H)  - BNP-N terminal pro; Future    Alcohol dependence with unspecified alcohol-induced disorder (H)      I spent a total of 20 minutes on the day of the visit.   Time spent by me doing chart review, history and exam, documentation and further activities per the note      BMI  Estimated body mass index is 25.4 kg/m  as calculated from the following:    Height as of this encounter: 1.753 m (5' 9\").    Weight as of this encounter: 78 kg (172 lb).         See Patient Instructions  Patient Instructions   Labs today    Chest XR today    Take lasix for 7 days    Follow up in 1 week, sooner if needed    Take doxycycline twice a day for 10 days    Follow up with edema clinic, call and schedule    Stop drinking alcohol, this can be hard on your liver    Leg swelling, also known as peripheral edema, can result from various causes ranging from benign to serious medical conditions. Proper diagnosis is crucial for effective treatment. Here is an overview of the potential causes, diagnostic approaches, and treatment options for leg swelling:    ### Common Causes of Leg Swelling    1. **Venous Insufficiency**     - Blood pools in the veins due to weakened valves.       2. **Heart Failure**     - The heart's inability to pump blood effectively can cause fluid buildup.    3. **Kidney Disease**     - Impaired kidney function can lead to fluid retention.    4. **Liver Disease**     - Liver dysfunction, such as in cirrhosis, can cause fluid accumulation.    5. **Lymphedema**     - Blockage in the lymphatic system leading to fluid retention.    6. **Infection**     - Cellulitis or other infections can cause localized swelling.    7. **Deep Vein Thrombosis (DVT)**     - A blood clot in the leg veins can cause sudden swelling and pain.    8. **Medications**     - Certain drugs, like calcium channel blockers, steroids, or NSAIDs, can cause swelling.    9. **Injury**     - Trauma or surgery to the leg can lead to swelling.    10. " **Obesity**      - Excess weight can cause increased pressure on leg veins.    11. **Pregnancy**      - Hormonal changes and increased blood volume can cause leg swelling.    ### Diagnosis of Leg Swelling    1. **Medical History and Physical Examination**     - Detailed medical history and physical exam to assess the extent and pattern of swelling.    2. **Blood Tests**     - To evaluate kidney function, liver function, and protein levels.    3. **Urine Tests**     - To check for kidney disease or proteinuria.    4. **Imaging Studies**     - **Ultrasound**: To check for DVT or venous insufficiency.     - **Echocardiogram**: To evaluate heart function.     - **CT Scan or MRI**: If deeper issues are suspected.    5. **Electrocardiogram (ECG)**     - To assess heart function and identify potential heart failure.    6. **Lymphoscintigraphy**     - To diagnose lymphedema by imaging the lymphatic system.    ### Treatment of Leg Swelling    #### **General Measures**    - **Elevation**: Elevate the legs above heart level to reduce swelling.  - **Compression Stockings**: Use graduated compression stockings to help prevent fluid buildup.  - **Diet and Lifestyle**: Low-sodium diet, regular exercise, and weight management.    #### **Specific Treatments Based on Cause**    1. **Venous Insufficiency**     - **Compression Therapy**: Stockings or bandages.     - **Medications**: Diuretics may be used, but with caution.     - **Surgical Options**: Vein stripping, sclerotherapy, or laser treatment.    2. **Heart Failure**     - **Medications**: Diuretics, ACE inhibitors, beta-blockers, or digoxin.     - **Lifestyle Changes**: Low-sodium diet, fluid restriction, and monitoring weight.    3. **Kidney Disease**     - **Medications**: Diuretics, ACE inhibitors, or ARBs.     - **Dialysis**: In severe cases of kidney failure.    4. **Liver Disease**     - **Diuretics**: Spironolactone or furosemide.     - **Paracentesis**: For severe  ascites.     - **Diet**: Low-sodium diet and alcohol abstinence.    5. **Lymphedema**     - **Compression Therapy**: Bandaging or stockings.     - **Manual Lymph Drainage**: Specialized massage techniques.     - **Exercise**: Gentle exercises to promote lymphatic drainage.    6. **Infection (Cellulitis)**     - **Antibiotics**: Appropriate antibiotics to treat infection.     - **Elevation and Compression**: To reduce swelling and promote healing.    7. **Deep Vein Thrombosis (DVT)**     - **Anticoagulants**: Blood thinners such as warfarin, heparin, or DOACs.     - **Compression Stockings**: To reduce symptoms and prevent complications.     - **Surgery**: In severe cases, thrombolysis or thrombectomy.    8. **Medication-Induced Edema**     - **Adjustment**: Review and adjust medications with a healthcare provider.    9. **Obesity**     - **Weight Loss**: Dietary changes and increased physical activity.    10. **Pregnancy**      - **Supportive Measures**: Elevation, compression stockings, and adequate hydration.    ### Summary    Leg swelling can have numerous causes, and a thorough diagnostic workup is essential to identify the underlying condition. Treatment involves addressing the specific cause of the swelling, alongside general measures such as leg elevation, compression therapy, and lifestyle modifications. Regular follow-up with healthcare providers is important to monitor the condition and adjust treatment as needed.            Nasreen Hall is a 68 year old, presenting for the following health issues:  Derm Problem and Edema        8/19/2024     7:38 AM   Additional Questions   Roomed by Opal GARCIA     History of Present Illness       Reason for visit:  Derm/Rash on located all over, the rash is itchy but no pain  Symptom onset:  1-2 weeks ago  Symptoms include:  Rash that is red and itching  Symptom progression:  Improving  Had these symptoms before:  Yes  Has tried/received treatment for these symptoms:   "Yes          Rash  Onset/Duration: 2 to 3 weeks ago  Description  Location: all over  Character: raised, red, itchy  Itching: moderate  Intensity:  moderate  Progression of Symptoms:  right leg is getting better but left leg still rash and all over body  Accompanying signs and symptoms:   Fever: No  Body aches or joint pain: No  Sore throat symptoms: No  Recent cold symptoms: No  History:           Previous episodes of similar rash: a few weeks ago  New exposures:  None  Recent travel: No  Exposure to similar rash: No  Precipitating or alleviating factors:   Therapies tried and outcome: none    Nurse Triage SBAR     Is this a 2nd Level Triage? YES, LICENSED PRACTITIONER REVIEW IS REQUIRED     Situation:  patient has a rash all over his body for 1 week. He also has swelling in both his leg up to his calves. He states they are \"leaking now\" for last 3 days.     Background: he tried some anti-itch cream for his rash and this is not helping much.     Assessment: he denies chest pain, difficulty breathing.  No fever.  He states he has blood blisters on his arms that he has had for a long time.  No redness on his legs.  No new soaps     Protocol Recommended Disposition:   Go To ED/UCC Now (Or To Office With PCP Approval)     Recommendation: per protocol patient advised to be seen today. Patient is wondering if he can get in to be seen tomorrow in [place].     Subjective  Pt reports a rash that is present all over the legs. The rash seems to worsen when socks are not worn for a few days. There is a particular spot on the leg where the rash appears to leak. PT attempted to alleviate the rash by wearing socks today. PT also mentions a previous episode of a similar rash about 10 years ago, which was treated by a doctor in [place]. The treatment involved a water pill, which seemed to clear up the rash a few days later.     PT also reports a sore spot underneath the rib cage but denies experiencing any shortness of breath " "or difficulty in breathing.    Objective  Physical exam: Rash observed all over legs, some areas leaking. No dyspnea reported. One sore spot reported under rib cage.     Assessment  - Rash on legs, worsening over the past three days. Similar rash occurred approximately 10 years ago, resolved with water pill.   - Sore spot under rib cage, no other chest pain or dyspnea reported.    Plan  - Took pictures of rash for further examination.  - No new tests or procedures mentioned.    Reports drinking 3 to 4 cans of beer daily and smoking 8 to 10 cigarettes daily since age 20.  Concerning for COPD and damage to the liver.  Discussed alcohol cessation          Objective    /72   Pulse 69   Temp 97.5  F (36.4  C) (Temporal)   Resp 16   Ht 1.753 m (5' 9\")   Wt 78 kg (172 lb)   SpO2 100%   BMI 25.40 kg/m    Body mass index is 25.4 kg/m .  Physical Exam             Office Visit on 04/17/2024   Component Date Value Ref Range Status    Magnesium 04/17/2024 1.5 (L)  1.7 - 2.3 mg/dL Final    Sodium 04/17/2024 126 (L)  135 - 145 mmol/L Final    Reference intervals for this test were updated on 09/26/2023 to more accurately reflect our healthy population. There may be differences in the flagging of prior results with similar values performed with this method. Interpretation of those prior results can be made in the context of the updated reference intervals.     Potassium 04/17/2024 4.9  3.4 - 5.3 mmol/L Final    Carbon Dioxide (CO2) 04/17/2024 27  22 - 29 mmol/L Final    Anion Gap 04/17/2024 10  7 - 15 mmol/L Final    Urea Nitrogen 04/17/2024 9.6  8.0 - 23.0 mg/dL Final    Creatinine 04/17/2024 0.94  0.67 - 1.17 mg/dL Final    GFR Estimate 04/17/2024 89  >60 mL/min/1.73m2 Final    Calcium 04/17/2024 9.6  8.8 - 10.2 mg/dL Final    Chloride 04/17/2024 89 (L)  98 - 107 mmol/L Final    Glucose 04/17/2024 113 (H)  70 - 99 mg/dL Final    Alkaline Phosphatase 04/17/2024 182 (H)  40 - 150 U/L Final    Reference intervals for " this test were updated on 11/14/2023 to more accurately reflect our healthy population. There may be differences in the flagging of prior results with similar values performed with this method. Interpretation of those prior results can be made in the context of the updated reference intervals.    AST 04/17/2024 133 (H)  0 - 45 U/L Final    Reference intervals for this test were updated on 6/12/2023 to more accurately reflect our healthy population. There may be differences in the flagging of prior results with similar values performed with this method. Interpretation of those prior results can be made in the context of the updated reference intervals.    ALT 04/17/2024 50  0 - 70 U/L Final    Reference intervals for this test were updated on 6/12/2023 to more accurately reflect our healthy population. There may be differences in the flagging of prior results with similar values performed with this method. Interpretation of those prior results can be made in the context of the updated reference intervals.      Protein Total 04/17/2024 8.1  6.4 - 8.3 g/dL Final    Albumin 04/17/2024 3.7  3.5 - 5.2 g/dL Final    Bilirubin Total 04/17/2024 1.1  <=1.2 mg/dL Final    Cholesterol 04/17/2024 139  <200 mg/dL Final    Triglycerides 04/17/2024 73  <150 mg/dL Final    Direct Measure HDL 04/17/2024 48  >=40 mg/dL Final    LDL Cholesterol Calculated 04/17/2024 76  <=100 mg/dL Final    Non HDL Cholesterol 04/17/2024 91  <130 mg/dL Final    Patient Fasting > 8hrs? 04/17/2024 Yes   Final    Prostate Specific Antigen Screen 04/17/2024 0.81  0.00 - 4.50 ng/mL Final    Creatinine Urine mg/dL 04/17/2024 109.3  mg/dL Final    The reference ranges have not been established in urine creatinine. The results should be integrated into the clinical context for interpretation.    Albumin Urine mg/L 04/17/2024 <12.0  mg/L Final    The reference ranges have not been established in urine albumin. The results should be integrated into the clinical  context for interpretation.    Albumin Urine mg/g Cr 04/17/2024    Final    Unable to calculate, urine albumin and/or urine creatinine is outside detectable limits.  Microalbuminuria is defined as an albumin:creatinine ratio of 17 to 299 for males and 25 to 299 for females. A ratio of albumin:creatinine of 300 or higher is indicative of overt proteinuria.  Due to biologic variability, positive results should be confirmed by a second, first-morning random or 24-hour timed urine specimen. If there is discrepancy, a third specimen is recommended. When 2 out of 3 results are in the microalbuminuria range, this is evidence for incipient nephropathy and warrants increased efforts at glucose control, blood pressure control, and institution of therapy with an angiotensin-converting-enzyme (ACE) inhibitor (if the patient can tolerate it).       No results found for any visits on 08/19/24.  No results found.        Signed Electronically by: Jyoti Stevens PA-C

## 2024-08-19 NOTE — PATIENT INSTRUCTIONS
Labs today    Chest XR today    Take lasix for 7 days    Follow up in 1 week, sooner if needed    Take doxycycline twice a day for 10 days    Follow up with edema clinic, call and schedule    Stop drinking alcohol, this can be hard on your liver    Leg swelling, also known as peripheral edema, can result from various causes ranging from benign to serious medical conditions. Proper diagnosis is crucial for effective treatment. Here is an overview of the potential causes, diagnostic approaches, and treatment options for leg swelling:    ### Common Causes of Leg Swelling    1. **Venous Insufficiency**     - Blood pools in the veins due to weakened valves.       2. **Heart Failure**     - The heart's inability to pump blood effectively can cause fluid buildup.    3. **Kidney Disease**     - Impaired kidney function can lead to fluid retention.    4. **Liver Disease**     - Liver dysfunction, such as in cirrhosis, can cause fluid accumulation.    5. **Lymphedema**     - Blockage in the lymphatic system leading to fluid retention.    6. **Infection**     - Cellulitis or other infections can cause localized swelling.    7. **Deep Vein Thrombosis (DVT)**     - A blood clot in the leg veins can cause sudden swelling and pain.    8. **Medications**     - Certain drugs, like calcium channel blockers, steroids, or NSAIDs, can cause swelling.    9. **Injury**     - Trauma or surgery to the leg can lead to swelling.    10. **Obesity**      - Excess weight can cause increased pressure on leg veins.    11. **Pregnancy**      - Hormonal changes and increased blood volume can cause leg swelling.    ### Diagnosis of Leg Swelling    1. **Medical History and Physical Examination**     - Detailed medical history and physical exam to assess the extent and pattern of swelling.    2. **Blood Tests**     - To evaluate kidney function, liver function, and protein levels.    3. **Urine Tests**     - To check for kidney disease or  proteinuria.    4. **Imaging Studies**     - **Ultrasound**: To check for DVT or venous insufficiency.     - **Echocardiogram**: To evaluate heart function.     - **CT Scan or MRI**: If deeper issues are suspected.    5. **Electrocardiogram (ECG)**     - To assess heart function and identify potential heart failure.    6. **Lymphoscintigraphy**     - To diagnose lymphedema by imaging the lymphatic system.    ### Treatment of Leg Swelling    #### **General Measures**    - **Elevation**: Elevate the legs above heart level to reduce swelling.  - **Compression Stockings**: Use graduated compression stockings to help prevent fluid buildup.  - **Diet and Lifestyle**: Low-sodium diet, regular exercise, and weight management.    #### **Specific Treatments Based on Cause**    1. **Venous Insufficiency**     - **Compression Therapy**: Stockings or bandages.     - **Medications**: Diuretics may be used, but with caution.     - **Surgical Options**: Vein stripping, sclerotherapy, or laser treatment.    2. **Heart Failure**     - **Medications**: Diuretics, ACE inhibitors, beta-blockers, or digoxin.     - **Lifestyle Changes**: Low-sodium diet, fluid restriction, and monitoring weight.    3. **Kidney Disease**     - **Medications**: Diuretics, ACE inhibitors, or ARBs.     - **Dialysis**: In severe cases of kidney failure.    4. **Liver Disease**     - **Diuretics**: Spironolactone or furosemide.     - **Paracentesis**: For severe ascites.     - **Diet**: Low-sodium diet and alcohol abstinence.    5. **Lymphedema**     - **Compression Therapy**: Bandaging or stockings.     - **Manual Lymph Drainage**: Specialized massage techniques.     - **Exercise**: Gentle exercises to promote lymphatic drainage.    6. **Infection (Cellulitis)**     - **Antibiotics**: Appropriate antibiotics to treat infection.     - **Elevation and Compression**: To reduce swelling and promote healing.    7. **Deep Vein Thrombosis (DVT)**     -  **Anticoagulants**: Blood thinners such as warfarin, heparin, or DOACs.     - **Compression Stockings**: To reduce symptoms and prevent complications.     - **Surgery**: In severe cases, thrombolysis or thrombectomy.    8. **Medication-Induced Edema**     - **Adjustment**: Review and adjust medications with a healthcare provider.    9. **Obesity**     - **Weight Loss**: Dietary changes and increased physical activity.    10. **Pregnancy**      - **Supportive Measures**: Elevation, compression stockings, and adequate hydration.    ### Summary    Leg swelling can have numerous causes, and a thorough diagnostic workup is essential to identify the underlying condition. Treatment involves addressing the specific cause of the swelling, alongside general measures such as leg elevation, compression therapy, and lifestyle modifications. Regular follow-up with healthcare providers is important to monitor the condition and adjust treatment as needed.

## 2024-08-19 NOTE — ED TRIAGE NOTES
Comes in with swollen legs that he has had for a couple of weeks, also states has a rash to his legs and back that started shortly before the leg swelling.      Triage Assessment (Adult)       Row Name 08/19/24 1022          Triage Assessment    Airway WDL WDL        Respiratory WDL    Respiratory WDL WDL        Skin Circulation/Temperature WDL    Skin Circulation/Temperature WDL WDL        Cardiac WDL    Cardiac WDL WDL        Peripheral/Neurovascular WDL    Peripheral Neurovascular WDL WDL        Cognitive/Neuro/Behavioral WDL    Cognitive/Neuro/Behavioral WDL WDL

## 2024-08-19 NOTE — ED NOTES
ED Nursing criteria listed below was addressed during verbal handoff:     Abnormal vitals: No  Abnormal results: Yes  Med Reconciliation completed: Yes  Meds given in ED: Yes  Any Overdue Meds: No  Core Measures: N/A  Isolation: N/A  Special needs: No  Skin assessment: Yes    Observation Patient  Education provided: Yes

## 2024-08-20 PROBLEM — K70.31 ALCOHOLIC CIRRHOSIS OF LIVER WITH ASCITES (H): Status: ACTIVE | Noted: 2024-01-01

## 2024-08-20 NOTE — ED PROVIDER NOTES
Pt. Due for Lasix this AM and noted BP was 86/48 with repeat of 95/53. Dr. Chi was notified and orders to hold this am dose of lasix and to monitor BP every 15 min x2 hours.

## 2024-08-20 NOTE — PROVIDER NOTIFICATION
0220 Notified Dr. Putnam: Na just drawn =119. Any interventions? Do you want a recheck at 0400? Also, pt has audible expiratory wheezes and wheezes auscultated. No PRNs for wheezing are ordered.     0325: Just wondering what your response is to my previous message regarding Na, next Na lab draw, and wheezes.     Philomena Putnam - 3:42 am  Any hx of asthma or COPd?    SURENDRA Putnam - 3:42 am  Check sodium in 4 hrs    SD  Read - 3:44 am  [facility] [address] [facility]. No documented hx of COPD or asthma, but pt is a long-term smoker.  Philomena Putnam - 3:45 am  Give duo-neb treatments q 6 prn and get morning chest X-ray

## 2024-08-20 NOTE — PROVIDER NOTIFICATION
Provider notified of Na lab resulting in 117. Instructed writer to continue to monitor until labs are rechecked in 2 hrs.

## 2024-08-20 NOTE — PROVIDER NOTIFICATION
Kelsi Roberts - Yesterday - 9:36 pm  Hi again, FN pt in room 269 (Isabel Alvarez) is here for hyponatremia, prior to my shift he received a 3% NS bolus for a sodium level decrease of 120 to 116. He is now at 118. Thoughts on his scheduled 60mg of IV lasix? I don't want to drop his sodium even lower, so wanted to run it past you first.    SURENDRA Putnam - Yesterday - 9:39 pm  Let hold of the dose and see how his sodium dose    NHUNG Roberts - Yesterday - 10:32 pm  Thanks, also regarding this pt- lab just informed me that their MRSA sample wasn't collected correctly earlier today and they need a new order.    NHUNG Roberts - Yesterday - 10:54 pm  Lab just called- Na is now 117.    NHUNG Roberts - Yesterday - 10:55 pm  Did you want to start the 3% NS infusion per Dr. Chi's 4093 note?    SURENDRA Putnam - Yesterday - 10:56 pm  No just repeat labs and will recheck sodium    SN  Read - 12:34 am  Repeat lab back at 117 again, what would you like to do?    SN  Read - 12:39 am  Don't we want to give the lasix to bring the sodium up?    MD ordered lasix to be given now.     SURENDRA Putnam - 12:46 am  Once we repeat the sodium level we can see weither to    SURENDRA Putnam - 12:46 am  Give or not    SN  Delivered - 1:19 am  We did repeat and it came back at the same 117

## 2024-08-20 NOTE — SIGNIFICANT EVENT
Significant Event Note    Time of event: 6:48 PM August 20, 2024    Description of event:  Notified by nursing of abnormal sodium lab result this evening.  Current sodium 119, slightly increased compared with sodium 118 at  2 PM and improved compared with 116 at 5 pm yesterday.    Plan:  Continue current treatment plan without change including continued monitoring of sodium every 4 hours    Reconsider 3% hypertonic saline bolus for sodium 116 or less    Discussed with: charge nurse    Shahram Dye MD

## 2024-08-20 NOTE — PLAN OF CARE
Problem: Adult Inpatient Plan of Care  Goal: Plan of Care Review  Description: The Plan of Care Review/Shift note should be completed every shift.  The Outcome Evaluation is a brief statement about your assessment that the patient is improving, declining, or no change.  This information will be displayed automatically on your shift  note.  Outcome: Progressing  Flowsheets (Taken 8/20/2024 1802)  Outcome Evaluation: Pt. A&Ox4. RA with sats >90% throughout shift. BP low morning with notification to provider and orders to monitor every 15 min for 4 hours. Lasix held this morning due to hypotension and able to give 40 mg IV lasix late morning. See flowsheet for details on BP, but from 1300 to 1400 pt. very hypotensive and rebound by 1400.  with AM labs and 118 with next 2 rechecks. Tele SR with 1st degree block. Denies pain. Up to side of bed for supper. Napping between cares.  Plan of Care Reviewed With: patient  Overall Patient Progress: no change

## 2024-08-20 NOTE — PROVIDER NOTIFICATION
Provider contacted regarding pt's IV lasix scheduled for this evening, due to his latest sodium lab reading 118, provider instructed to hold this dose.

## 2024-08-20 NOTE — PROGRESS NOTES
S-(situation): Patient changed to inpatient status    B-(background): Patient status change due to observation goals not being met. Na continues to be below 120.    A-(assessment): Pt. A&Ox4. VSS on RA this morning. Denies pain.     R-(recommendations):  Continue to replace electrolytes and monitor. Will monitor patient per MD orders.       Inpatient nursing criteria listed below were met:    Adult Profile completedYes  Health care directives status obtained and documented: Yes  VTE prophylaxis orders: No orders at this time.  (FYI: Asprin is not an approved anticoagulation for DVT prophylaxis)  SCD's Documented:  Not ordered yet. Pt. Is up in room.  Vaccine assessment done and vaccine ordered if needed. Not Applicable  My Chart patient sign up addressed and documented:  Pending  Care Plan initiated: Yes  Discharge planning review completed (admission navigator) Yes

## 2024-08-20 NOTE — PROGRESS NOTES
Pt states he feels like his breathing is doing just fine. He is coarse/rhonchi sounding bilaterally but otherwise appears to be in no respiratory distress.   Nebs changed to PRN this was discussed with pt and he verbalized understanding.   Thank you   RT

## 2024-08-20 NOTE — PROGRESS NOTES
PRIMARY DIAGNOSIS: HYPONATREMIA  OUTPATIENT/OBSERVATION GOALS TO BE MET BEFORE DISCHARGE:  ADLs back to baseline: Yes    Activity and level of assistance: Ambulating independently.    Pain status: Pain free.    Return to near baseline physical activity: Yes     Discharge Planner Nurse   Safe discharge environment identified: Yes  Barriers to discharge: Yes- Last Na level 118. Currently waiting for results of latest lab draw.        Entered by: Kelsi Roberts RN 08/19/2024 10:38 PM     Please review provider order for any additional goals.   Nurse to notify provider when observation goals have been met and patient is ready for discharge.

## 2024-08-20 NOTE — PROVIDER NOTIFICATION
08/20/24 1108   Critical Test Results/Notification   Critical Lab Result (Lab Name and Value) Na 118   What Time Did The Lab Notify You? 1108   Provider Notified yes   Date of Provider Notification 08/20/24   Time of Provider Notification 1108   Mechanism of Provider notification page   What Provider Did You Notify? ELIN Chi   Response aware

## 2024-08-20 NOTE — PROGRESS NOTES
[facility]    Medicine Progress Note - Hospitalist Service    Date of Admission:  8/19/2024    Assessment & Plan   Isabel Alvarez is a 68 year old male with PMHx of HTN, hyperlipidemia, alcohol dependence with previous DUI on monitoring program but with ongoing regular alcohol use, tobacco use disorder, hypomagnesia and chronic hyponatremia (baseline sodium 126-128) who presented to the clinic this morning with concerns for worsening lower leg edema and new cellulitis.  Labs work was performed and patient was discharged home with start of lasix BID and doxycycline but before the patient could start either of these medications he was instructed to presented to ED for sodium of 120 and need for correction.  Work up has revealed anasarca secondary to newly diagnosed cirrhosis with overlying left lower leg cellulitis due to skin breakdown from edema with worsening hyponatremia suspected secondary to anasarca degree present.  Patient's sodium has further lowered but stabilized in the 116-120 range with ongoing diuresis efforts with patient will have severe pitting edema and cellulitis.  Patient will transition to inpatient status today for ongoing diuresis, sodium supplementation as needed, IV antibiotics and medical management.     Principal Problem:    Acute on Chronic Hyponatremia    Assessment: Patient's baseline sodium appears to be 126-128 over the past few years but no outpatient work up as to etiology appears to have been performed.  Patient does have known alcohol dependence and is also on lisinopril which both may contribute.  Currently patient's sodium has decreased to 120 in context of apparent volume overload/anasarca in patient 18 lbs up with pitting edema up to lower abdomen and concern for underlying liver impairment.      Plan:   -register to observation status  -recheck sodium now to see impact on Lasix administered by ED provider at 1100  -have asked ED provider to  order abdominal ultrasound to evaluate for ascites and liver appearance as cirrhosis is strongly suspected given presentation and lab work  -perform UA when able and will also obtain urine sodium and urine osmolality for baseline  -Lasix 60 mg every 6 hours planned (will re-evaluate if sodium is already increasing on blood work above)  -sodium every 4 hours  -consider paracentesis if significant ascites is present.   -monitor closely for improvement in edema/anasarca  -patient does not have to have all of extra weight off prior to discharge but needs to have sodium stabilizing in previous normal range and good outpatient plan for close follow up and ongoing diuresis     Active Problems:    Anasarca    Cirrhosis on abdominal ultrasound    Assessment: patient gainied weight slowly over the past 2-3 months and has pitting edema up to his lower abdominal wall, concern for ascites on abdominal exam as well.  Given history and current lab work, concern is for developing liver disease from prolonged alcohol use, which abdominal ultrasound confirming cirrhosis appearance.   BNP is negative, chest x-ray is clear making cardiac disease less likely, especially given patient's clinical history.  Patient's weight is not significantly improved, however patient was given excessively more than fluid restriction ordered last evening and is neg 1L up today     Plan:   -change patient to inpatient status  -give metolazone 2.5 mg x1 now  -Continue Lasix 40 mg IV every 8 hours  -plan for paracentesis tomorrow what available by radiology   -nursing to inform if any hypotension develops  -Continue strict I/O  -fluid restriction of 1,500 mL - emphasized to patient and nurse the importance of keeping true to this restriction   -daily weights  -continue liver work up as below  -close outpatient follow up with PCP      Cellulitis of left lower extremity    Assessment: developing in the past few days prior to admission with notably warmth,  tenderness, swelling compared to right lower leg. Patient did get a prescription for doxycycline at clinic appointment on day of presentation but has not yet started it.  Patient started on Ancef with symptoms stable but not yet improving today.  No signs of SIRS/sepsis     Plan:   -will continue with Ancef and monitor for clinical improvement       Hypomagnesemia    Assessment: magnesium is 1.2 on presentation.  On review, patient has had known low magnesium levels in the past and is supposed to be taking daily Mag ox supplementation 400 mg daily but patient is unsure if he has been taking recently.  Has been started on replacement protocol supplementation with magnesium now low normal range    Plan:   -Continue high replacement magnesium supplementation protocol during this observation stay and anticipate ongoing PO supplementation at time of discharge       Newly diagnosis cirrhosis     Elevated LFTs    Elevated INR    Thrombocytopenia (H24)    Anemia    Assessment: patient has elevated AST (2:1 ratio with ALT), total bilirubin (2.9), INR (1.46) and not on blood thinners, and new thrombocytopenia of 108 and anemia of 10.7 in patient with frequent heavy alcohol use, limited only by [employer] testing required by law due to previous DUI.  Now has new anasarca/weight gain in combination with this constellations of lab abnormalities concerning for liver disease such as cirrhosis or alcoholic hepatitis which was confirmed but abdominal ultrasound     Plan:   -continue with diuresis efforts as above  -retest CMP, INR, CBC tomorrow for ongoing close monitoring  -will need outpatient follow up with PCP in near future for further monitoring       Alcohol dependence with unspecified alcohol-induced disorder (H)    Assessment: patient had a DWI approximatley 10 years ago and reports still being in a monitoring program following this.  His next testing is tomorrow morning (he has to be in [place] at the testing site  between 9-11 am) and he reports he cannot miss this appointment or his program monitoring would be extended another 3 years.  His last drink was yesterday at noon as he has learned when to stop drinking in order to have a negative test during these appointments.  He reports he drinks between 3-12 beers a day on a days he can drink and it varies from week to week how many days he can drink depending on how often he needs to test.  He does not feel that his drinking is a problem for him and has no desire to quit at this time but does verbalized today that he is planning on cutting back because of his liver issues    Plan:   -monitor for any symptoms of withdrawal during this hospitalization stay  -patient will need close outpatient monitoring and extensive conversations going forward about his alcohol use and his options as patient will allow.       Memory impairment - possible    Assessment:  since admission patient has seem confused about the plan despite several discussions of new diagnosis and goals during this stay. He also thought he had an appointment to check his alcohol today but in conversation with the testing center that was never scheduled.      Plan:  -will have occupational therapy perform cognitive evaluation (SLUMS) to better assess and clarify areas of deficit to better facilitate safe discharge plan      Benign essential hypertension    Assessment: patient is on lisinopril 20 mg daily and Toprol  mg daily.  Blood pressures are low normal and home regimen has been held since admission to allow for more aggressive diuresis to occur.      Plan:   -continue to hold lisinopril and Toprol XL currently given need for ongoing aggressive diuresis       Hyperlipidemia LDL goal <100    Assessment: on pravastatin 40 mg daily    Plan:   -hold for now while liver work up is in process but anticipate restarting at time of discharge.       Tobacco abuse disorder    Assessment: patient currently is smoking  "approximately 10 cigarettes a day.  Has rash allergy to the nicotine patches, cannot chew gum due to his dentures but is able to use the lozenges for cravings without side effects.  He has been on Chantix with good results but co-pay was too expensive so he just went back to cigarettes again because it was cheaper.      Plan:   -will allow nicotine lozenges as needed for cravings during this stay   -patient is not interested in quitting at this time      Gastroesophageal reflux disease without esophagitis    Assessment: patient uses his omeprazole every other day    Plan:   -transition to Protonix during this hospital stay and restart omeprazole at discharge           Diet: Low Saturated Fat Na <2400 mg  Fluid restriction 1500 ML FLUID    DVT Prophylaxis: Ambulate every shift  L[place]aga Catheter: Not present  Lines: None     Cardiac Monitoring: ACTIVE order. Indication: Electrolyte Imbalance (24 hours)- Magnesium <1.3 mg/ml; Potassium < =2.8 or > 5.5 mg/ml  Code Status: Full Code      Clinically Significant Risk Factors Present on Admission         # Hyponatremia: Lowest Na = 116 mmol/L in last 2 days, will monitor as appropriate    # Hypomagnesemia: Lowest Mg = 1.2 mg/dL in last 2 days, will replace as needed   # Hypoalbuminemia: Lowest albumin = 2.6 g/dL at 8/20/2024  5:35 AM, will monitor as appropriate  # Coagulation Defect: INR = 1.43 (Ref range: 0.85 - 1.15) and/or PTT = N/A, will monitor for bleeding  # Thrombocytopenia: Lowest platelets = 108 in last 2 days, will monitor for bleeding   # Hypertension: Noted on problem list    # Anemia: based on hgb <11       # Overweight: Estimated body mass index is 25.65 kg/m  as calculated from the following:    Height as of this encounter: 1.727 m (5' 8\").    Weight as of this encounter: 76.5 kg (168 lb 11.2 oz).                    Disposition Plan     Medically Ready for Discharge: Anticipated in 2-4 Days             Meghana Chi MD  Hospitalist Service  [facility]  Securely message with [employer]. (more info)  Text page via [employer] Paging/Directory   ______________________________________________________________________    Interval History   Patient has been vitally stable with blood pressures low normal.  Weight down less than 1 lb with sodium 116-120 in the past 24 hours and patient still asymptomatic with that.  Patient does seem confused at times regarding why he is here and what is being done.  No other new symptoms or concerns per patient     Physical Exam   Vital Signs: Temp: 97.7  F (36.5  C) Temp src: Oral BP: 113/61 Pulse: 72   Resp: 18 SpO2: 98 % O2 Device: None (Room air)    Weight: 168 lbs 11.2 oz    Constitutional: awake, alert, cooperative, appears chronically ill  Respiratory: No increased work of breathing, good air exchange, mild expiratory wheezing noted  Cardiovascular: RRR  GI: bowel sounds present, abdomen soft and non-tender   Musculoskeletal: ongoing 3+ pitting edema in feet and lower legs, trace pitting edema in thighs but no edema seen on abdominal wall today.  Left lower leg still very erythematous, swollen and tender and overall unchanged from admission   Neurologic: awake, alert, oriented to person and place but does not remember large parts of conversation yesterday, confused about plan for today     Medical Decision Making       60 MINUTES SPENT BY ME on the date of service doing chart review, history, exam, documentation & further activities per the note.      Data     I have personally reviewed the following data over the past 24 hrs:    6.0  \   9.8 (L)   / 118 (L)     118 (LL) 85 (L) 10.6 /  99   4.0 22 0.95 \     ALT: 18 AST: 50 (H) AP: 131 TBILI: 1.8 (H)   ALB: 2.6 (L) TOT PROTEIN: 5.8 (L) LIPASE: N/A     INR:  1.43 (H) PTT:  N/A   D-dimer:  N/A Fibrinogen:  N/A

## 2024-08-20 NOTE — PLAN OF CARE
Goal Outcome Evaluation:      Plan of Care Reviewed With: patient    Overall Patient Progress: no changeOverall Patient Progress: no change    Outcome Evaluation: Vss. RA 94%. Lungs are diminished. Please see provider notification notes from the night regarding Na levels. IV lasix was given. Pt voided 100mL. Has edema to BLE, L>R. Na level at 119 this morning from 117 during the night. Fluid restriction in place. Telemetry is NSR.

## 2024-08-20 NOTE — PROVIDER NOTIFICATION
Dr. Chi notified that during 15 min BP checks, while writing nurse was in another room, pt. Hypotensive as low 68/32 and patient back to WNL by the time writing nurse was in room and monitoring. Instructions to let provider know if it happened again.

## 2024-08-20 NOTE — PROVIDER NOTIFICATION
0036: Notified Dr. Putnam:  Read - 1:43 am  [facility] Room 269 Isabel Alvarez, here with hyponatremia. I am about to give the 60mg IV lasix that you said to give (previous dose was held at 2200). UC=346/64. Are you ok with this dose?    SD  Read - 1:56 am  [facility] Room 269 Isabel Alvarez. Just rechecked another BP 96/60 (MAP 81).   High Priority    SURENDRA Putnam - 1:58 am  Yes

## 2024-08-21 NOTE — PLAN OF CARE
Goal Outcome Evaluation:      Plan of Care Reviewed With: patient               VSS. Afebrile. Pt BP 90s/40s this shift.  LS clear throughout. BS active. BLE edema remains R>L 2-3+.  Pt alert and oriented x3. Calm and cooperative. No concerns at this time.

## 2024-08-21 NOTE — PROGRESS NOTES
08/21/24 1300   Appointment Info   Signing Clinician's Name / Credentials (OT) Shahram Mcgovern OTR/L   Rehab Comments (OT) Upon entering room patient was lying in bed watching tv. Patient willingly participated in evaluation and cognitive screen.       Present no   Living Environment   People in Home parent(s)   Current Living Arrangements house   Home Accessibility no concerns   Transportation Anticipated car, drives self;family or friend will provide   Living Environment Comments Patient lives in a 1 story home with his parents. Patient reports no stairs to enter the home or inside the home, has grab bars near the toilet and in shower.   Self-Care   Usual Activity Tolerance moderate   Current Activity Tolerance moderate   Equipment Currently Used at Home none   Fall history within last six months no   Instrumental Activities of Daily Living (IADL)   Previous Responsibilities meal prep;housekeeping;laundry;shopping;yardwork;driving;medication management;finances   IADL Comments Patient reports he has no issues with finances, he does all of the shopping, cooking, cleaning, and driving.   General Information   Onset of Illness/Injury or Date of Surgery 08/19/24   Referring Physician Meghana Chi MD   Patient/Family Therapy Goal Statement (OT) Wants to go home   Additional Occupational Profile Info/Pertinent History of Current Problem Per chart: Isabel Alvarez is a 68 year old male with PMHx of HTN, hyperlipidemia, alcohol dependence with previous DUI on monitoring program but with ongoing regular alcohol use, tobacco use disorder, hypomagnesia and chronic hyponatremia (baseline sodium 126-128) who presented to the clinic this morning with concerns for worsening lower leg edema and new cellulitis.  Labs work was performed and patient was discharged home with start of lasix BID and doxycycline but before the patient could start either of these medications he was instructed to  presented to ED for sodium of 120 and need for correction.  Work up has revealed concern for anasarca likely secondary to cirrhosis development with overlying left lower leg cellulitis due to skin breakdown from edema with worsening hyponatremia suspected secondary to anasarca degree present.  Patient will be registered to observation status with ongoing diuresis efforts, IV antibiotics and further evaluation of suspected liver impairment.   Left Upper Extremity (Weight-bearing Status) full weight-bearing (FWB)   Right Upper Extremity (Weight-bearing Status) full weight-bearing (FWB)   Left Lower Extremity (Weight-bearing Status) full weight-bearing (FWB)   Right Lower Extremity (Weight-bearing Status) full weight-bearing (FWB)   Cognitive Status Examination   Orientation Status orientation to person, place and time   Affect/Mental Status (Cognitive) WFL   Follows Commands follows one-step commands;follows two-step commands;over 90% accuracy   Cognitive Status Comments See [facility] score   Cognitive Screens/Assessments   Cognitive Assessments Completed [facility] Mental Status Exam (UMS):  Total Score out of /30 27/30   [facility] Norms 27-30 equals normal   [facility] Domains assessed: orientation, memory, attention, executive functions   SLUMS Interpretation Patient's score falls in the normal category.   Visual Perception   Visual Impairment/Limitations WFL   Pain Assessment   Patient Currently in Pain No   Bed Mobility   Comment (Bed Mobility) Patient able to independetly get self from lying in bed to sitting EOB.   Transfers   Transfer Comments Patient SBA with sit to stand transfer from bed.   Activities of Daily Living   BADL Assessment/Intervention lower body dressing;toileting   Lower Body Dressing Assessment/Training   Position (Lower Body Dressing) edge of bed sitting   Comment, (Lower Body Dressing) Patient able to don/doff socks while seated EOB and SBA with donning/doffing of underwear while  standing at toilet.   [place] Level (Lower Body Dressing) supervision;lower body dressing skills;doff;don;pants/bottoms;socks   Toileting   Comment, (Toileting) Patient SBA with toileting simulation and lower body dressing component. Patient able to simulate going to the bathroom by donning/doffing of underwear.   [place] Level (Toileting) supervision   Clinical Impression   Criteria for Skilled Therapeutic Interventions Met (OT) Evaluation only   OT Diagnosis cognition   Influenced by the following impairments ADL assessment   OT Problem List-Impairments impacting ADL hearing   ADL comments/analysis Patient independent with bed mobility. SBA with donning/doffing socks and underwear. SBA with walking mobility and sit to stands. SBA with toileting simulation.   Assessment of Occupational Performance 1-3 Performance Deficits   Clinical Decision Making Complexity (OT) problem focused assessment/low complexity   Risk & Benefits of therapy have been explained evaluation/treatment results reviewed;patient   Clinical Impression Comments Patient is a 68 year old male who presents with no difficulty with ADLs. Patient SBA during ADLs and scored a 27/30 on the SLUMS.   OT Total Evaluation Time   OT Eval, Low Complexity Minutes (32539) 45   OT Discharge Planning   OT Plan Eval only   OT Discharge Recommendation (DC Rec) home   OT Rationale for DC Rec Patient previously from home with parents. Patient assists parents in IADLs and is independent in all of his ADLs. Currently scored 27/30 on SLUMS and demonstrates no concerns for ADLs and mobility at this time. Patient should be able to return home as he is near baseline function.   OT Brief overview of current status Patient is SBA with ADLs and sit to stands. Patient scored a 27/30 on the SLUMS which falls in the normal category.   Total Session Time   Total Session Time (sum of timed and untimed services) 45     Shahram Mcgovern OTR/L  Occupational Therapist

## 2024-08-21 NOTE — PROGRESS NOTES
[facility]    PROGRESS NOTE - Hospitalist Service    Assessment and Plan    Principal Problem:    Hyponatremia  Active Problems:    Benign essential hypertension    Hyperlipidemia LDL goal <100    Tobacco abuse disorder    Alcohol dependence with unspecified alcohol-induced disorder (H)    Hypomagnesemia    Elevated LFTs    Cellulitis of left lower extremity    Anasarca    Elevated INR    Thrombocytopenia (H24)    Anemia    Gastroesophageal reflux disease without esophagitis    Alcoholic cirrhosis of liver with ascites (H)    Isabel Alvarez is a 68 year old male with h/o HTN, hyperlipidemia, alcohol dependence with previous DUI on monitoring program but with ongoing regular alcohol use, tobacco use disorder, hypomagnesia and chronic hyponatremia (baseline sodium 126-128) who presented to the clinic this morning with concerns for worsening lower leg edema and new cellulitis.  Labs work was performed and patient was discharged home with start of lasix BID and doxycycline but before the patient could start either of these medications he was instructed to presented to ED for sodium of 120 and need for correction.  Work up has revealed anasarca secondary to newly diagnosed cirrhosis with overlying left lower leg cellulitis due to skin breakdown from edema with worsening hyponatremia suspected secondary to anasarca degree present.  Patient's sodium has further lowered but stabilized in the 116-120 range with ongoing diuresis efforts with patient will have severe pitting edema and cellulitis.  Patient will transition to inpatient status today for ongoing diuresis, sodium supplementation as needed, IV antibiotics and medical management.      Acute on Chronic Hyponatremia Patient's baseline sodium appears to be 126-128 over the past few years   -have asked ED provider to order abdominal ultrasound to evaluate for ascites and liver appearance as cirrhosis is strongly suspected given presentation and  lab work  -perform UA when able and will also obtain urine sodium and urine osmolality for baseline  -Lasix 60 mg every 6 hours continue for now   -sodium every 4 hours  - US paracentesis almost 4 liters removed  - trend renal function   - 2gm Na  and continue 1500ml fluid restriction  - given sodium chloride once         Anasarca  Cirrhosis on abdominal ultrasound, elevated LFTs  - diuresing as above   - given metolazone 2.5 mg x1 yesterday   -Continue Lasix 40 mg IV every 8 hours  - s/p paracentesis today and should improve   -nursing to inform if any hypotension develops  -Continue strict I/O  -fluid restriction of 1,500 mL - emphasized to patient and nurse the importance of keeping true to this restriction   -daily weights  -continue liver work up as below  -close outpatient follow up with PCP  - trend LFTs       Cellulitis of left lower extremity    Assessment: developing in the past few days prior to admission with notably warmth, tenderness, swelling compared to right lower leg. Patient did get a prescription for doxycycline at clinic appointment on day of presentation but has not yet started it.  Patient started on Ancef with symptoms stable but not yet improving today.  No signs of SIRS/sepsis   -will continue with Ancef and monitor for clinical improvement        Hypomagnesemia  - replacement protocol      Elevated INR    Thrombocytopenia (H24)    Anemia    Assessment: patient has elevated AST (2:1 ratio with ALT), total bilirubin (2.9), INR (1.46) and not on blood thinners, and new thrombocytopenia of 108 and anemia of 10.7 in patient with frequent heavy alcohol use, limited only by [employer] testing required by law due to previous DUI.  Now has new anasarca/weight gain in combination with this constellations of lab abnormalities concerning for liver disease such as cirrhosis or alcoholic hepatitis which was confirmed but abdominal ultrasound   -continue with diuresis efforts as above  - trending CMP  and INR       Alcohol dependence with unspecified alcohol-induced disorder (H)    Assessment: [facility] and reports still being in a monitoring program following this.  His next testing is tomorrow morning (he has to be in [place] at the testing site between 9-11 am) and he reports he cannot miss this appointment or his program monitoring would be extended another 3 years.  His last drink was yesterday at noon as he has learned when to stop drinking in order to have a negative test during these appointments.  He reports he drinks between 3-12 beers a day on a days he can drink and it varies from week to week how many days he can drink depending on how often he needs to test.  He does not feel that his drinking is a problem for him and has no desire to quit at this time but does verbalized today that he is planning on cutting back because of his liver issues    -monitor for any symptoms of withdrawal during this hospitalization stay  -patient will need close outpatient monitoring and extensive conversations going forward about his alcohol use and his options as patient will allow.        Memory impairment - possible  - occupational therapy perform cognitive evaluation (SLUMS) to better assess and clarify areas of deficit to better facilitate safe discharge plan       Benign essential hypertension  - IV diuresis   -continue to hold lisinopril and Toprol XL currently given need for ongoing aggressive diuresis        Hyperlipidemia LDL goal <100  -hold for now while liver work up is in process but anticipate restarting at time of discharge.        Tobacco abuse disorder    Assessment: patient currently is smoking approximately 10 cigarettes a day.  Has rash allergy to the nicotine patches, cannot chew gum due to his dentures but is able to use the lozenges for cravings without side effects.  He has been on Chantix with good results but co-pay was too expensive so he just went back to  "cigarettes again because it was cheaper.      Plan:   -will allow nicotine lozenges as needed for cravings during this stay   -patient is not interested in quitting at this time       Gastroesophageal reflux disease without esophagitis    Assessment: patient uses his omeprazole every other day    Plan:   -transition to Protonix during this hospital stay and restart omeprazole at discharge       Clinically Significant Risk Factors         # Hyponatremia: Lowest Na = 116 mmol/L in last 2 days, will monitor as appropriate    # Hypomagnesemia: Lowest Mg = 1.5 mg/dL in last 2 days, will replace as needed   # Hypoalbuminemia: Lowest albumin = 2.5 g/dL at 8/21/2024  5:23 AM, will monitor as appropriate    # Coagulation Defect: INR = 1.55 (Ref range: 0.85 - 1.15) and/or PTT = N/A, will monitor for bleeding  # Thrombocytopenia: Lowest platelets = 109 in last 2 days, will monitor for bleeding   # Hypertension: Noted on problem list           # Overweight: Estimated body mass index is 25.36 kg/m  as calculated from the following:    Height as of this encounter: 1.727 m (5' 8\").    Weight as of this encounter: 75.7 kg (166 lb 12.8 oz)., PRESENT ON ADMISSION                    COVID-19 PCR Results           No data to display              COVID-19 Antibody Results, Testing for Immunity           No data to display               Code Status: Full Code  VTE prophylaxis:  Pneumatic Compression Devices  DIET: Orders Placed This Encounter      Combination Diet Low Saturated Fat Diet    Drains/Lines: Patient has No line.    L[place]aga Catheter: Not present    Weight bearing status: WBAT        Medically Ready for Discharge: Anticipated in 2-4 Days    Subjective:  Denies SOB and CP, sodium still low    PHYSICAL EXAM  Temp:  [97.4  F (36.3  C)-98.2  F (36.8  C)] 98  F (36.7  C)  Pulse:  [68-89] 89  Resp:  [18-20] 20  BP: ()/(41-57) 118/52  SpO2:  [96 %-99 %] 98 %  Wt Readings from Last 1 Encounters:   08/21/24 75.7 kg (166 lb 12.8 oz) "       Intake/Output Summary (Last 24 hours) at 8/21/2024 1645  Last data filed at 8/21/2024 1553  Gross per 24 hour   Intake 960 ml   Output 5120 ml   Net -4160 ml      Body mass index is 25.36 kg/m .    Physical Exam  Vitals and nursing note reviewed.   Constitutional:       General: He is not in acute distress.     Appearance: He is ill-appearing.   Eyes:      General: Scleral icterus present.   Cardiovascular:      Rate and Rhythm: Normal rate and regular rhythm.      Pulses: Normal pulses.   Pulmonary:      Effort: Pulmonary effort is normal.      Comments: Trace crackles at bases  Abdominal:      General: Bowel sounds are normal. There is no distension.      Palpations: Abdomen is soft.      Tenderness: There is no abdominal tenderness. There is no guarding.      Comments: Decreased per patient afte rparacentesis   Musculoskeletal:         General: Normal range of motion.      Comments: 2+ BLE edema   Skin:     General: Skin is warm and dry.      Capillary Refill: Capillary refill takes less than 2 seconds.      Coloration: Skin is jaundiced.   Neurological:      General: No focal deficit present.      Mental Status: He is alert. Mental status is at baseline.       PERTINENT LABS/IMAGING:  Results for orders placed or performed during the hospital encounter of 08/19/24   US Abdomen Limited    Impression    IMPRESSION:  1.  Morphologic changes suggestive of cirrhosis with moderate volume  ascites.    PILO UP MD         SYSTEM ID:  [id number]   XR Chest Port 1 View    Impression    IMPRESSION: Negative chest.   US Paracentesis with Albumin    Impression    IMPRESSION:  1.  Status post ultrasound-guided paracentesis.    NOEMY FIGUEROA MD         SYSTEM ID:  [id number]       Imaging results reviewed over the past 24 hrs:   Recent Results (from the past 24 hour(s))   US Paracentesis with Albumin    Narrative    US PARACENTESIS WITH ALBUMIN 8/21/2024 12:02 PM    CLINICAL HISTORY: new cirrhosis with acies  diagnosis with severe  volume overload    PROCEDURE: Informed consent obtained. Time out performed. The abdomen  was prepped and draped in a sterile fashion. Less than 10 mL of 1%  lidocaine was infused into local soft tissues. A 5 Citizen of [place] catheter  system was introduced into the abdominal ascites under ultrasound  guidance.    3.82 liters of clear fluid were removed and sent to lab if requested.    Patient tolerated procedure well.    Ultrasound imaging was obtained and placed in the patient's permanent  medical record.      Impression    IMPRESSION:  1.  Status post ultrasound-guided paracentesis.    NOEMY FIGUEROA MD         SYSTEM ID:  [id number]     Recent Labs   Lab 08/21/24  1337 08/21/24  1022 08/21/24  0523 08/20/24  1014 08/20/24  0535 08/19/24  1659 08/19/24  1053 08/19/24  0828   WBC  --   --  5.4  --  6.0  --   --  5.5   HGB  --   --  9.2*  --  9.8*  --   --  10.7*   MCV  --   --  91  --  92  --   --  93   PLT  --   --  109*  --  118*  --   --  108*   INR  --   --  1.55*  --  1.43*  --  1.46*  --    * 121* 122*   < > 119*   < > 120* 120*   POTASSIUM  --   --  3.8  --  4.0  --  4.9 4.8   CHLORIDE  --   --  86*  --  85*  --  87* 86*   CO2  --   --  25  --  22  --  19* 23   BUN  --   --  12.8  --  10.6  --  8.9 8.5   CR  --   --  1.15  --  0.95  --  0.85 0.84   ANIONGAP  --   --  11  --  12  --  14 11   YUNIOR  --   --  8.2*  --  8.2*  --  8.5* 8.5*   GLC  --   --  96  --  99  --  100* 107*   ALBUMIN  --   --  2.5*  --  2.6*  --   --  2.9*   PROTTOTAL  --   --  5.5*  --  5.8*  --   --  6.4   BILITOTAL  --   --  1.4*  --  1.8*  --   --  2.9*   ALKPHOS  --   --  122  --  131  --   --  161*   ALT  --   --  8  --  18  --   --  30   AST  --   --  50*  --  50*  --   --  62*    < > = values in this interval not displayed.     Recent Labs   Lab Test 04/17/24  0750   CHOL 139   HDL 48   LDL 76   TRIG 73     Recent Labs   Lab Test 08/21/24  1337 08/21/24  1022 08/21/24  0523   *   < > 122*   POTASSIUM  --    "--  3.8   CHLORIDE  --   --  86*   CO2  --   --  25   GLC  --   --  96   BUN  --   --  12.8   CR  --   --  1.15   GFRESTIMATED  --   --  70   YUNIOR  --   --  8.2*    < > = values in this interval not displayed.     No results for input(s): \"A1C\" in the last 05254 hours.  Recent Labs   Lab Test 08/21/24  0523 08/20/24  0535 08/19/24  0828   HGB 9.2* 9.8* 10.7*     No results for input(s): \"TROPONINI\" in the last 06720 hours.  Recent Labs   Lab Test 08/19/24  0828   NTBNP 603     No results for input(s): \"TSH\" in the last 89693 hours.  Recent Labs   Lab Test 08/21/24  0523 08/20/24  0535 08/19/24  1053   INR 1.55* 1.43* 1.46*       40 MINUTES SPENT BY ME on the date of service doing chart review, history, exam, documentation, discussion with nursing staff and specialist, & further activities per the note.  Bekah Zelaya MD  [facility]  [phone]   "

## 2024-08-22 PROBLEM — D68.9 COAGULOPATHY (H): Status: ACTIVE | Noted: 2024-01-01

## 2024-08-22 PROBLEM — E87.6 HYPOKALEMIA: Status: ACTIVE | Noted: 2024-01-01

## 2024-08-22 NOTE — PLAN OF CARE
Problem: Adult Inpatient Plan of Care  Goal: Plan of Care Review  Description: The Plan of Care Review/Shift note should be completed every shift.  The Outcome Evaluation is a brief statement about your assessment that the patient is improving, declining, or no change.  This information will be displayed automatically on your shift  note.  Outcome: Progressing  Flowsheets (Taken 8/21/2024 1944)  Outcome Evaluation: Pt. A&Ox4. VSS on RA except soft assymptomatic BP. Continues IV lasix 40 mg without concerns. Paracentesis around lunch with 3850 out. No need for albumin replacement. Site C/D/I with no bruising or hematoma. Mag of 1.5 this AM replaced with 4gr IV and recheck of 2.6 with AM redraw. . Denies pain. Up to BR with SBA and using urinal. Managing fluid restriction very well. Tele SR. Up throughout shift. Up to side of bed for meals. Ambulated with PT.  Plan of Care Reviewed With: patient  Overall Patient Progress: improving

## 2024-08-22 NOTE — PLAN OF CARE
Goal Outcome Evaluation:      Plan of Care Reviewed With: patient    Overall Patient Progress: improving     Patient alert and oriented x4. Pleasant and cooperative. With coarse lung sounds, tight and congested cough. Abdomen soft, bowel sounds normoactive. Paracentesis site intact, no drainage. Weight down 11.5 lbs. Edema to bilateral feet with redness on left shin. SBA to bathroom. IV Ancef and Lasix given.

## 2024-08-22 NOTE — PROGRESS NOTES
[facility]    PROGRESS NOTE - Hospitalist Service    Assessment and Plan    Principal Problem:    Hyponatremia  Active Problems:    Hypomagnesemia    Cellulitis of left lower extremity    Alcoholic cirrhosis of liver with ascites (H)    Coagulopathy (H24)    Hypokalemia    Benign essential hypertension    Hyperlipidemia LDL goal <100    Tobacco abuse disorder    Alcohol dependence with unspecified alcohol-induced disorder (H)    Elevated LFTs    Anasarca    Elevated INR    Thrombocytopenia (H24)    Anemia    Gastroesophageal reflux disease without esophagitis    Isabel Alvarez is a 68 year old male with h/o HTN, hyperlipidemia, alcohol dependence with previous DUI on monitoring program but with ongoing regular alcohol use, tobacco use disorder, hypomagnesia and chronic hyponatremia (baseline sodium 126-128) who presented to the clinic this morning with concerns for worsening lower leg edema and new cellulitis.  Labs work was performed and patient was discharged home with start of lasix BID and doxycycline but before the patient could start either of these medications he was instructed to presented to ED for sodium of 120 and need for correction.  Work up has revealed anasarca secondary to newly diagnosed cirrhosis with overlying left lower leg cellulitis due to skin breakdown from edema with worsening hyponatremia suspected secondary to anasarca degree present.  Patient's sodium has further lowered but stabilized in the 116-120 range with ongoing diuresis efforts with patient will have severe pitting edema and cellulitis.  Patient will transition to inpatient status today for ongoing diuresis, sodium supplementation as needed, IV antibiotics and medical management.      Acute on Chronic Hyponatremia Patient's baseline sodium appears to be 126-128 over the past few years per chart  -have asked ED provider to order abdominal ultrasound to evaluate for ascites and liver appearance as  cirrhosis is strongly suspected given presentation and lab work  - Chanda and Uosm does not support SIADH but also obtained after doses of Furosemide   - sodium 123<124  -Lasix 60 mg every 6 hs stopped with creatinine bump and change to lasix 20mg BID to start later today after IV albumin   - spironolactone to start tomorrow am 12.5mg daily.   -sodium every 4 hrs  - US paracentesis almost 4 liters removed  - 2gm Na  and continue 1500ml fluid restriction  - given sodium chloride x 2 doses(likely ineffective but attempt)    Anasarca  Cirrhosis on abdominal ultrasound, elevated LFTs  - given metolazone 2.5 mg x1 previously  - s/p paracentesis  - IV lasix change to po 20mg BID likely will need to increase dose but after reassess renal function   -Continue strict I/O  -fluid restriction of 1,500 mL   -daily weights  -continue liver work up as below  -close outpatient follow up with PCP and GI  - trend LFTs     Cellulitis of left lower extremity    Assessment: developing in the past few days prior to admission with notably warmth, tenderness, swelling compared to right lower leg. Patient did get a prescription for doxycycline at clinic appointment on day of presentation but has not yet started it.  P  - improving slowly, edema also contributing  - change to PO keflex today to minimize and additional fluids.     Hypomagnesemia  - replacement protocol     Hypokalemia. mild  - repalcement protocol      Coagulopathy     Thrombocytopenia (H24)    Anemia  - combination BM suppression from ETOH use + cirrhosis + nutritional   - trend labs  - cessation stressed   - hold on Vit K given unlikely effective       Alcohol dependence with unspecified alcohol-induced disorder (H)    Assessment: patient had a DWI approximatley 10 years ago and reports still being in a monitoring program following this.  His next testing is tomorrow morning (he has to be in [place] at the testing site between 9-11 am) and he reports he cannot miss this  appointment or his program monitoring would be extended another 3 years.    - no signs of withdrawal  - patient may need documentation of being hospitalized because he needs X amount of times to have done for monitoring and starting his car. Patient needs to call and find out.         Benign essential hypertension pressures soft   - continue holding lisinopril and metoprolol likely will need adjustment. No signs of portal HTN or varices  on US but consider propranolol for the future   - lasix 20mg BID may need to be increased  - starting spirolactone 12.5 mg daily would suggest 25mg if renal function and pressures allow.       Hyperlipidemia LDL goal <100  - statin was on hold due to LFTs  - will resume today and lower dose and trend LFts       Tobacco abuse disorder  -nicotine lozenges as needed for cravings during this stay   -patient is not interested in quitting at this time       Gastroesophageal reflux disease without esophagitis  -transition to Protonix during this hospital stay and restart omeprazole at discharge       Clinically Significant Risk Factors         # Hyponatremia: Lowest Na = 118 mmol/L in last 2 days, will monitor as appropriate    # Hypomagnesemia: Lowest Mg = 1.5 mg/dL in last 2 days, will replace as needed   # Hypoalbuminemia: Lowest albumin = 2.4 g/dL at 8/22/2024  6:25 AM, will monitor as appropriate    # Coagulation Defect: INR = 1.68 (Ref range: 0.85 - 1.15) and/or PTT = N/A, will monitor for bleeding  # Thrombocytopenia: Lowest platelets = 82 in last 2 days, will monitor for bleeding  # Acute Kidney Injury, unspecified: based on a >150% or 0.3 mg/dL increase in last creatinine compared to past 90 day average, will monitor renal function  # Hypertension: Noted on problem list                              COVID-19 PCR Results           No data to display              COVID-19 Antibody Results, Testing for Immunity           No data to display               Code Status: Full Code  VTE  prophylaxis:  Pneumatic Compression Devices  DIET: Orders Placed This Encounter      Combination Diet Low Saturated Fat Diet    Drains/Lines: Patient has No line.    Lizarraga Catheter: Not present    Weight bearing status: WBAT        Medically Ready for Discharge: Anticipated in 2-4 Days    Subjective:  Denies SOB and CP, patient wanting to leave but he is in agreement with staying another night. He is more concerned with having to use the breathalyzer monitoring device with his car and starting so that he is compliance with the court. Nursing staff will help hi m to the car and allow him to perform this. He was also instructed he needs to contact the court about him being hospitalized and may fall short of the number of times needed to completed before ea certain date. Informed him this is his responsivity but we will provide documentation that he is hospitalized so that he is in compliance      PHYSICAL EXAM  Temp:  [97.4  F (36.3  C)-98.3  F (36.8  C)] 97.9  F (36.6  C)  Pulse:  [80-92] 85  Resp:  [18-20] 20  BP: ()/(43-55) 95/46  SpO2:  [96 %-99 %] 97 %  Wt Readings from Last 1 Encounters:   08/22/24 70.4 kg (155 lb 4.8 oz)       Intake/Output Summary (Last 24 hours) at 8/21/2024 1645  Last data filed at 8/21/2024 1553  Gross per 24 hour   Intake 960 ml   Output 5120 ml   Net -4160 ml      Body mass index is 23.61 kg/m .    Physical Exam  Vitals and nursing note reviewed.   Constitutional:       General: He is not in acute distress.     Appearance: He is ill-appearing.   Eyes:      General: Scleral icterus present.      Comments: Prosthetic left eye   Cardiovascular:      Rate and Rhythm: Normal rate and regular rhythm.      Pulses: Normal pulses.   Pulmonary:      Effort: Pulmonary effort is normal.      Comments: Trace crackles at bases, coarse BS and exp wheezes  Abdominal:      General: Bowel sounds are normal. There is no distension.      Palpations: Abdomen is soft.      Tenderness: There is no  abdominal tenderness. There is no guarding.      Comments: ND and NT   Musculoskeletal:         General: Normal range of motion.      Comments: Right foot erythema fading, and on anterior shin. Second toe on right foot with bruising at nail bed and erythema on toe, appears almost necrotic but is warm and blanchable and good capillary refill, palpable pulses  2+ BLE edema   Skin:     General: Skin is warm and dry.      Capillary Refill: Capillary refill takes less than 2 seconds.      Coloration: Skin is jaundiced.   Neurological:      General: No focal deficit present.      Mental Status: He is alert. Mental status is at baseline.       PERTINENT LABS/IMAGING:  Results for orders placed or performed during the hospital encounter of 08/19/24   US Abdomen Limited    Impression    IMPRESSION:  1.  Morphologic changes suggestive of cirrhosis with moderate volume  ascites.    PILO UP MD         SYSTEM ID:  [id number]   XR Chest Port 1 View    Impression    IMPRESSION: Negative chest.   US Paracentesis with Albumin    Impression    IMPRESSION:  1.  Status post ultrasound-guided paracentesis.    NOEMY FIGUEROA MD         SYSTEM ID:  [id number]       Imaging results reviewed over the past 24 hrs:   Recent Results (from the past 24 hour(s))   US Paracentesis with Albumin    Narrative    US PARACENTESIS WITH ALBUMIN 8/21/2024 12:02 PM    CLINICAL HISTORY: new cirrhosis with acies diagnosis with severe  volume overload    PROCEDURE: Informed consent obtained. Time out performed. The abdomen  was prepped and draped in a sterile fashion. Less than 10 mL of 1%  lidocaine was infused into local soft tissues. A 5 Kinyarwanda catheter  system was introduced into the abdominal ascites under ultrasound  guidance.    3.82 liters of clear fluid were removed and sent to lab if requested.    Patient tolerated procedure well.    Ultrasound imaging was obtained and placed in the patient's permanent  medical record.      Impression     "IMPRESSION:  1.  Status post ultrasound-guided paracentesis.    NOEMY FIGUEROA MD         SYSTEM ID:  [id number]     Recent Labs   Lab 08/22/24  1001 08/22/24  0625 08/22/24  0219 08/21/24  1022 08/21/24  0523 08/20/24  1014 08/20/24  0535   WBC  --  5.4  --   --  5.4  --  6.0   HGB  --  8.8*  --   --  9.2*  --  9.8*   MCV  --  91  --   --  91  --  92   PLT  --  82*  --   --  109*  --  118*   INR  --  1.68*  --   --  1.55*  --  1.43*   * 124* 123*   < > 122*   < > 119*   POTASSIUM  --  3.6  --   --  3.8  --  4.0   CHLORIDE  --  87*  --   --  86*  --  85*   CO2  --  25  --   --  25  --  22   BUN  --  17.0  --   --  12.8  --  10.6   CR  --  1.39*  --   --  1.15  --  0.95   ANIONGAP  --  12  --   --  11  --  12   YUNIOR  --  8.2*  --   --  8.2*  --  8.2*   GLC  --  93  --   --  96  --  99   ALBUMIN  --  2.4*  --   --  2.5*  --  2.6*   PROTTOTAL  --  5.5*  --   --  5.5*  --  5.8*   BILITOTAL  --  1.4*  --   --  1.4*  --  1.8*   ALKPHOS  --  114  --   --  122  --  131   ALT  --  <5  --   --  8  --  18   AST  --  50*  --   --  50*  --  50*    < > = values in this interval not displayed.     Recent Labs   Lab Test 04/17/24  0750   CHOL 139   HDL 48   LDL 76   TRIG 73     Recent Labs   Lab Test 08/21/24  1337 08/21/24  1022 08/21/24  0523   *   < > 122*   POTASSIUM  --   --  3.8   CHLORIDE  --   --  86*   CO2  --   --  25   GLC  --   --  96   BUN  --   --  12.8   CR  --   --  1.15   GFRESTIMATED  --   --  70   YUNIOR  --   --  8.2*    < > = values in this interval not displayed.     No results for input(s): \"A1C\" in the last 47260 hours.  Recent Labs   Lab Test 08/21/24  0523 08/20/24  0535 08/19/24  0828   HGB 9.2* 9.8* 10.7*     No results for input(s): \"TROPONINI\" in the last 67060 hours.  Recent Labs   Lab Test 08/19/24  0828   NTBNP 603     No results for input(s): \"TSH\" in the last 81566 hours.  Recent Labs   Lab Test 08/21/24  0523 08/20/24  0535 08/19/24  1053   INR 1.55* 1.43* 1.46*       50 MINUTES SPENT BY " ME on the date of service doing chart review, history, exam, documentation, discussion with nursing staff and specialist, & further activities per the note.  Bekah Zelaya MD  [facility] Medicine Service  [phone]

## 2024-08-23 PROBLEM — N17.9 ACUTE KIDNEY FAILURE, UNSPECIFIED (H): Status: ACTIVE | Noted: 2024-01-01

## 2024-08-23 NOTE — PLAN OF CARE
Goal Outcome Evaluation:      Plan of Care Reviewed With: patient    Overall Patient Progress: improvingOverall Patient Progress: improving     Patient is A&Ox4. Denies pain. Walking SBA in halls with gait belt. K+, Mg and Phos replaced with AM rechecks. Last 2 sodium checks were 127. IV SL. Tolerating diet and fluid restriction. 660ml left of restriction. Abdomen remains distended. Midodrine given and blood pressures have been stable all day with no complaints of dizziness.

## 2024-08-23 NOTE — PLAN OF CARE
Problem: Adult Inpatient Plan of Care  Goal: Plan of Care Review  Description: The Plan of Care Review/Shift note should be completed every shift.  The Outcome Evaluation is a brief statement about your assessment that the patient is improving, declining, or no change.  This information will be displayed automatically on your shift  note.  Outcome: Progressing  Flowsheets (Taken 8/22/2024 1954)  Outcome Evaluation: Pt. A&Ox4. Soft BP continue with Map 65 and higher throughout shift. HR elevated starting at 1630. See provider notification. EKG ordered at shift change. IV albumin given this AM. Potassium protocol initiated with 3.6, replaced with oral 20 mEq and recheck in AM. Mag of 1.9 and orally replaced with AM recheck. NA check every 4 hours of 123, 123, and 1256 at last check of 1800. Ok per provider this AM for staff to bring patient out to car to perform breath testing. Up to BR with SBA. Denies pain throughout shift. napping after lunch.  Plan of Care Reviewed With: patient  Overall Patient Progress: improving

## 2024-08-23 NOTE — PLAN OF CARE
"Goal Outcome Evaluation:                 Outcome Evaluation: Patient alert and oriented, has been up in halls ambulating, blood pressures stable, heart rate tachycardic but stable 108-110 with ambulation. Pleasant. His sister brought in a 24 oz can of beer for him, he recognized it himself and appropriately asked staff to dispose of it. This happened at shift change and was brought to the attention of this staff member by off-going RN, who is the RN who did dispose of it, but the patient himself did then talk about it on his own without this writer even mentioning it. \"If I only have about a year left to live because of my alcohol use, I do not want to use it anymore! That is what landed me here this time, I do not need that!\" Patient did talk about maybe being able to discharge to home tomorrow.      "

## 2024-08-23 NOTE — CONSULTS
Care Management Initial Consult    General Information  Assessment completed with: Patient,    Type of CM/SW Visit: Initial Assessment    Primary Care Provider verified and updated as needed: Yes   Readmission within the last 30 days: no previous admission in last 30 days      Reason for Consult: other (see comments) (Elevated readmission risk score)  Advance Care Planning: Advance Care Planning Reviewed: no concerns identified          Communication Assessment  Patient's communication style: spoken language (English or Bilingual)    Hearing Difficulty or Deaf: no   Wear Glasses or Blind: yes    Cognitive  Cognitive/Neuro/Behavioral: WDL                      Living Environment:   People in home: parent(s)     Current living Arrangements: house      Able to return to prior arrangements: yes       Family/Social Support:  Care provided by:    Provides care for: no one  Marital Status: Single  Parent(s), Sibling(s)          Description of Support System: Supportive, Involved    Support Assessment: Adequate family and caregiver support, Adequate social supports    Current Resources:   Patient receiving home care services: No     Community Resources: None  Equipment currently used at home: none  Supplies currently used at home: None    Employment/Financial:  Employment Status: retired        Financial Concerns: none           Does the patient's insurance plan have a 3 day qualifying hospital stay waiver?  No    Lifestyle & Psychosocial Needs:  Social Determinants of Health     Food Insecurity: Low Risk  (8/21/2024)    Food Insecurity     Within the past 12 months, did you worry that your food would run out before you got money to buy more?: No     Within the past 12 months, did the food you bought just not last and you didn t have money to get more?: No   Depression: Not at risk (4/17/2024)    PHQ-2     PHQ-2 Score: 0   Housing Stability: Low Risk  (8/21/2024)    Housing Stability     Do you have housing? : Yes     Are  you worried about losing your housing?: No   Tobacco Use: High Risk (8/19/2024)    Patient History     Smoking Tobacco Use: Some Days     Smokeless Tobacco Use: Never     Passive Exposure: Not on file   Financial Resource Strain: Low Risk  (8/21/2024)    Financial Resource Strain     Within the past 12 months, have you or your family members you live with been unable to get utilities (heat, electricity) when it was really needed?: No   Alcohol Use: Not on file   Transportation Needs: Low Risk  (8/21/2024)    Transportation Needs     Within the past 12 months, has lack of transportation kept you from medical appointments, getting your medicines, non-medical meetings or appointments, work, or from getting things that you need?: No   Physical Activity: Inactive (4/17/2024)    Exercise Vital Sign     Days of Exercise per Week: 0 days     Minutes of Exercise per Session: 0 min   Interpersonal Safety: Low Risk  (8/23/2024)    Interpersonal Safety     Do you feel physically and emotionally safe where you currently live?: Yes     Within the past 12 months, have you been hit, slapped, kicked or otherwise physically hurt by someone?: No     Within the past 12 months, have you been humiliated or emotionally abused in other ways by your partner or ex-partner?: No   Stress: No Stress Concern Present (4/17/2024)    [employer] [place] [employer] - Occupational Stress Questionnaire     Feeling of Stress : Not at all   Social Connections: Unknown (4/17/2024)    Social Connection and Isolation Panel [NHANES]     Frequency of Communication with Friends and Family: Not on file     Frequency of Social Gatherings with Friends and Family: Once a week     Attends Scientology Services: Not on file     Active Member of Clubs or Organizations: Not on file     Attends Club or Organization Meetings: Not on file     Marital Status: Not on file   Health Literacy: Not on file       Functional Status:  Prior to admission patient needed  assistance:   Dependent ADLs:: Independent  Dependent IADLs:: Independent  Assesssment of Functional Status: At functional baseline    Mental Health Status:  Mental Health Status: No Current Concerns       Chemical Dependency Status:  Chemical Dependency Status: Current Concern  Chemical Dependency Management: Other (see comment) (Patient reports he will not be drinking alcohol after discharge)          Values/Beliefs:  Spiritual, Cultural Beliefs, Jew Practices, Values that affect care: no               Additional Information:  Care Management consulted for elevated risk for readmission.    Writer visited with patient at the bedside.  Reviewed the information above.  Patient denies having any discharge planning needs.      Discussed elevated risk for readmission and referral to clinic care coordination.  Patient in agreement with this. Patient has a PCP appointment on 8/26/24.      Patient's truck is in the parking lot and patient plans to drive himself home.      No further needs from Care Management.     KAMALA Davis  [facility]   [phone]

## 2024-08-23 NOTE — PLAN OF CARE
Goal Outcome Evaluation:      Plan of Care Reviewed With: patient    Overall Patient Progress: no change     Patient alert and oriented. Pleasant and cooperative. Lung sounds coarse with infrequent cough. Abdomen rounded, bowel sounds normoactive. Bilateral edema to LE, left shin redness receding. Scattered bruises. Continued on Sodium checks every 4 hours. Noted hypotensive episode at 88/34, provider notified. Midodrine given, labs taken. Electrolytes replaced. Lactic acid of 1.2. Compliant with fluid restriction. Denies pain. BP recheck at 107/57. Weight gain of 1.5 lbs. With episodes of sinus tachycardia. 1st degree AV block on tele.

## 2024-08-23 NOTE — PROGRESS NOTES
[facility]    Medicine Progress Note - Hospitalist Service    Date of Admission:  8/19/2024    Assessment & Plan     Isabel Alvarez is a 68 year old male with PMHx of HTN, hyperlipidemia, alcohol dependence with previous DUI on monitoring program but with ongoing regular alcohol use, tobacco use disorder, hypomagnesia and chronic hyponatremia (baseline sodium 126-128) who presented to the clinic this morning with concerns for worsening lower leg edema and new cellulitis.  Labs work was performed and patient was discharged home with start of lasix BID and doxycycline but before the patient could start either of these medications he was instructed to presented to ED for sodium of 120 and need for correction.  Work up has revealed anasarca secondary to newly diagnosed cirrhosis with overlying left lower leg cellulitis due to skin breakdown from edema with worsening hyponatremia suspected secondary to anasarca degree present.  Patient has been aggressively diuresed with sodium improving into the 124-126 range (after first decreasing into the 116-119 range) and patient transitioned to PO diuretics today.  Blood pressures have been low normal with MAPs under 65 and patient developed an BELA thought secondary to hypoperfusion.  Patient has been started on PO midodrine to allow for blood pressures stabilization but ongoing diuretic administration with blood pressures no improving.  Patient will continue with midodrine TID, ongoing Lasix and spironolactone and recheck renal function and electrolytes later today and tomorrow to ensure improvement as hypoperfusion resolves.  Possible discharge in the next 1-2 days once appropriate combination of PO medications has been found.      Principal Problem:    Acute on Chronic Hyponatremia    Assessment: Patient's baseline sodium appears to be 126-128 over the past few years but no outpatient work up as to etiology appears to have been performed.   Patient does have known alcohol dependence and is also on lisinopril which both may contribute.  Currently patient's sodium decreased to 116-120 in context of apparent volume overload/anasarca in patient 18 lbs up with pitting edema up to lower abdomen and concern for underlying liver impairment.  Now appears to be improving to the 124-126 range as patient nears dry weight.      Plan:   -continue sodium every 4 hours  -continue Lasix and spironolactone as above  -patient does not have to have all of extra weight off prior to discharge but needs to have sodium stabilizing in previous normal range and good outpatient plan for close follow up and ongoing diuresis     Active Problems:    Anasarca    Cirrhosis on abdominal ultrasound    Assessment: patient gainied weight slowly over the past 2-3 months and has pitting edema up to his lower abdominal wall, concern for ascites on abdominal exam as well.  Given history and current lab work, concern is for developing liver disease from prolonged alcohol use, which abdominal ultrasound confirming cirrhosis appearance.   BNP is negative, chest x-ray is clear making cardiac disease less likely, especially given patient's clinical history.  Patient's weight did decrease with IV diuresis and patient was transitioned to PO diuretics yesterday however blood pressures were low normal with MAPs less than 65 and BELA has developed with fluid bolus given last evening and weight now up almost 2 lbs from yesterday morning.  Patient did have a paracentesis x1 during this stay with almost 4L of fluid removed.    Plan:   -Midodrine 5 mg TID to help improve blood pressures and allow for ongoing diuresis efforts  -continue Lasix 20 mg BID and spironolactone 12.5 mg daily   -Continue strict I/O with fluid restriction of 1,500 mL   -daily weights  -close outpatient follow up with PCP      Acute kidney injury    Assessment:  Patient renal function was normal at admission but did increase to 1.3  yesterday with ongoing aggressive diuresis.  Today is even worse despite IV fluids given overnight and suspected secondary to aggressive diuresis in combination with allowing hypoperfusion (MAPs consistently under 65 yesterday morning for many hours).      Plan:    -will continue with midodrine and ongoing diuretics as above  -recheck creatinine this afternoon and would expect some improvement with above regimen, otherwise if worsening will hold further diuretics for today  -recheck tomorrow morning.       Cellulitis of left lower extremity    Assessment: developing in the past few days prior to admission with notably warmth, tenderness, swelling compared to right lower leg. Patient did get a prescription for doxycycline at clinic appointment on day of presentation but has not yet started it.  Patient started on Ancef with symptoms improving and patient was transitioned to Keflex on 8/22    Plan:   -will continue with Keflex and monitor for ongoing improvement       Hypomagnesemia    Assessment: magnesium is 1.2 on presentation.  On review, patient has had known low magnesium levels in the past and is supposed to be taking daily Mag ox supplementation 400 mg daily but patient is unsure if he has been taking recently.  Has been started on replacement protocol supplementation with magnesium now low normal range    Plan:   -Continue high replacement magnesium supplementation protocol during this observation stay and anticipate ongoing PO supplementation at time of discharge       Newly diagnosis cirrhosis     Elevated LFTs    Elevated INR    Thrombocytopenia (H24)    Anemia    Assessment: patient has elevated AST (2:1 ratio with ALT), total bilirubin (2.9), INR (1.46) and not on blood thinners, and new thrombocytopenia of 108 and anemia of 10.7 in patient with frequent heavy alcohol use, limited only by [employer] testing required by law due to previous DUI.  Now has new anasarca/weight gain in combination with this  constellations of lab abnormalities concerning for liver disease such as cirrhosis or alcoholic hepatitis which was confirmed but abdominal ultrasound     Plan:   -continue with diuresis efforts as above  -retest CMP, INR, CBC tomorrow for ongoing close monitoring  -will need outpatient follow up with PCP in near future for further monitoring       Alcohol dependence with unspecified alcohol-induced disorder (H)    Assessment: patient had a DWI approximatley 10 years ago and reports still being in a monitoring program following this.  His next testing is tomorrow morning (he has to be in [place] at the testing site between 9-11 am) and he reports he cannot miss this appointment or his program monitoring would be extended another 3 years.  His last drink was yesterday at noon as he has learned when to stop drinking in order to have a negative test during these appointments.  He reports he drinks between 3-12 beers a day on a days he can drink and it varies from week to week how many days he can drink depending on how often he needs to test.  He does not feel that his drinking is a problem for him and he will be able to quit vs cut back without difficulty     Plan:   -monitor for any symptoms of withdrawal during this hospitalization stay  -patient will need close outpatient monitoring and extensive conversations going forward about his alcohol use and his options as patient will allow.       Memory impairment - suspected and ruled out    Assessment:  since admission patient has seem confused about the plan despite several discussions of new diagnosis and goals during this stay however SLUMS normal (27/30) and patient appears more alert and understanding on evaluation today     Plan:  -continue supportive care as needed       Benign essential hypertension    Assessment: patient is on lisinopril 20 mg daily and Toprol  mg daily at baseline but blood pressures were ow normal and home regimen has been held since  admission to allow for more aggressive diuresis to occur.      Plan:   -continue to hold lisinopril and Toprol XL currently given need for ongoing aggressive diuresis       Hyperlipidemia LDL goal <100    Assessment: on pravastatin 40 mg daily    Plan:   -continue home regimen and monitor stability of liver labs       Tobacco abuse disorder    Assessment: patient currently is smoking approximately 10 cigarettes a day.  Has rash allergy to the nicotine patches, cannot chew gum due to his dentures but is able to use the lozenges for cravings without side effects.  He has been on Chantix with good results but co-pay was too expensive so he just went back to cigarettes again because it was cheaper.      Plan:   -will allow nicotine lozenges as needed for cravings during this stay   -patient is not interested in quitting at this time      Gastroesophageal reflux disease without esophagitis    Assessment: patient uses his omeprazole every other day    Plan:   -transition to Protonix during this hospital stay and restart omeprazole at discharge           Diet: Fluid restriction 1500 ML FLUID  Combination Diet Low Saturated Fat Diet    DVT Prophylaxis: Pneumatic Compression Devices  Lizarraga Catheter: Not present  Lines: None     Cardiac Monitoring: ACTIVE order. Indication: Electrolyte Imbalance (24 hours)- Magnesium <1.3 mg/ml; Potassium < =2.8 or > 5.5 mg/ml  Code Status: Full Code      Clinically Significant Risk Factors         # Hyponatremia: Lowest Na = 120 mmol/L in last 2 days, will monitor as appropriate      # Hypoalbuminemia: Lowest albumin = 2.4 g/dL at 8/22/2024  6:25 AM, will monitor as appropriate  # Coagulation Defect: INR = 1.81 (Ref range: 0.85 - 1.15) and/or PTT = N/A, will monitor for bleeding  # Thrombocytopenia: Lowest platelets = 82 in last 2 days, will monitor for bleeding  # Acute Kidney Injury, unspecified: based on a >150% or 0.3 mg/dL increase in last creatinine compared to past 90 day average,  will monitor renal function  # Hypertension: Noted on problem list                            Disposition Plan     Medically Ready for Discharge: Anticipated Tomorrow             Meghana Chi MD  Hospitalist Service  [facility]  Securely message with [employer] (more info)  Text page via [employer] Paging/Directory   ______________________________________________________________________    Interval History   Patient had ongoing borderline low systolic function with MAPS intermittently under 65 yesterday even after albumin administration and patient received IV fluid bolus and midodrine 10 mg x1 this morning with blood pressures now normalizing.  Patient continues to feel fine and wants to go home as soon as possible.  He is pleased by his weight loss and the amount of edema he has now.  No new patient or nursing concerns.     Physical Exam   Vital Signs: Temp: 97.7  F (36.5  C) Temp src: Oral BP: 117/61 Pulse: 108   Resp: 18 SpO2: 97 % O2 Device: None (Room air)    Weight: 157 lbs 0 oz    Constitutional: awake, alert, cooperative, no apparent distress, and appears chronically ill and stated age  Respiratory: No increased work of breathing, good air exchange, clear to auscultation bilaterally, no crackles or wheezing  Cardiovascular: RRR  GI: bowel sounds present, abdomen soft and non-distended, nontender   Skin:  erythema on the right foot is much improved with lessened warmth, swelling and intensity of color.  No new area of skin involvement noted.   Musculoskeletal: patient has 1+ pitting edema of bilateral feet but no significant lower leg edema noted   Neurologic: Awake, alert, oriented to name, place and situation     Medical Decision Making       45 MINUTES SPENT BY ME on the date of service doing chart review, history, exam, documentation & further activities per the note.      Data     I have personally reviewed the following data over the past 24 hrs:    4.9  \   8.6 (L)    / 96 (L)     127 (L) 89 (L) 22.8 /  137 (H)   3.8 26 1.33 (H) \     ALT: <5 AST: 47 (H) AP: 117 TBILI: 1.6 (H)   ALB: 2.7 (L) TOT PROTEIN: 5.3 (L) LIPASE: N/A     Procal: N/A CRP: N/A Lactic Acid: 1.2       INR:  1.81 (H) PTT:  N/A   D-dimer:  N/A Fibrinogen:  N/A

## 2024-08-23 NOTE — PROVIDER NOTIFICATION
Original message to Dr. Zelaya who had already left. Recent to Dr. Dickerson updating her that patient's HR has been in the 120-150's per tele, but review has been in the 120-130's on and off since 1630. Pt. Asymptomatic and resting in bed after supper. Radial pulse of 102. Orders for EKG received. Next shift to review with provider.

## 2024-08-24 NOTE — PROGRESS NOTES
S-(situation): Patient discharged to home via car with self    B-(background): patient admitted due to worsening leg swelling and hyponatremia.     A-(assessment): A&Ox4. Denied pain. Up independent. Voiding, tolerating diet, having BM's. Eager to discharge.     R-(recommendations): Discharge instructions reviewed with patient, patient verbalizes understanding. Listed belongings gathered and returned to patient. yes         Discharge Nursing Criteria:   Patient Education given and documented: (STROKE, CHF, Diabetes):  { N/A    Care Plan and Patient education resolved: Yes    New Medications- pt has been educated about purpose and side effects: Yes    Vaccines  Influenza status verified at discharge:  Not Applicable      MISC  Prescriptions if needed, hard copies sent with patient  NA  Home medications returned to patient: NA  Medication Bin checked and emptied on discharge Yes  Patient reports post-discharge pain management plan is effective: Yes

## 2024-08-24 NOTE — PLAN OF CARE
"Goal Outcome Evaluation:      Plan of Care Reviewed With: patient    Overall Patient Progress: improving    Outcome Evaluation: A & O x4, able to make needs known.  Takes pills whole.  VSS.  On RA w SpO2 > 90%.  Continent BB, Ind w walker and gb.  No c/o pain throughout shift.  Compliant w fluid restriction.    /61 (BP Location: Left arm)   Pulse 107   Temp 98  F (36.7  C) (Oral)   Resp 16   Ht 1.727 m (5' 8\")   Wt 71.1 kg (156 lb 11.2 oz)   SpO2 97%   BMI 23.83 kg/m      Carina Mccarty RN on 8/24/2024 at 6:40 AM      "

## 2024-08-24 NOTE — PROGRESS NOTES
Care Management Discharge Note    Discharge Date: 08/24/2024       Discharge Disposition: Home    Discharge Services: None    Discharge DME: None    Discharge Transportation: car, drives self (Truck is here. Pt will drive self home)    Private pay costs discussed: Not applicable    Does the patient's insurance plan have a 3 day qualifying hospital stay waiver?  No    Patient/family educated on Medicare website which has current facility and service quality ratings: no    Education Provided on the Discharge Plan: Yes    Persons Notified of Discharge Plans: Patient     Patient/Family in Agreement with the Plan: yes    Handoff Referral Completed: Yes    Additional Information:  Per physician, patient is medically ready for discharge to home today.    Patient had no discharge planning needs.  Patient has a PCP appointment on Monday, 8/26, that writer encouraged him to attend.      Patient transported self home.      KAMALA Davis  [facility]   [phone]

## 2024-08-24 NOTE — DISCHARGE SUMMARY
[facility]  Hospitalist Discharge Summary      Date of Admission:  8/19/2024  Date of Discharge:  8/24/2024  Discharging Provider: Meghana Chi MD  Discharge Service: Hospitalist Service    Discharge Diagnoses   Principal Problem:    Hyponatremia  Active Problems:    Alcoholic cirrhosis of liver with ascites (H)    Cellulitis of left lower extremity    Hypomagnesemia    Elevated LFTs    Anasarca    Elevated INR    Thrombocytopenia (H24)    Anemia    Benign essential hypertension    Hyperlipidemia LDL goal <100    Tobacco abuse disorder    Alcohol dependence with unspecified alcohol-induced disorder (H)    Gastroesophageal reflux disease without esophagitis    Coagulopathy (H24)    Hypokalemia    Acute kidney failure, unspecified (H)      Clinically Significant Risk Factors          Follow-ups Needed After Discharge   Patient will follow up with Dr. Josias Ch on Monday for very close hospital follow up and recheck of weight, renal function and electrolytes     Discharge Disposition   Discharged to home  Condition at discharge: Stable    Hospital Course   Isabel Alvarez is a 68 year old male with PMHx of HTN, hyperlipidemia, alcohol dependence with previous DUI on monitoring program but with ongoing regular alcohol use, tobacco use disorder, hypomagnesia and chronic hyponatremia (baseline sodium 126-128) who presented to the clinic this morning with concerns for worsening lower leg edema and new cellulitis.  Labs work was performed and patient was discharged home with start of lasix BID and doxycycline but before the patient could start either of these medications he was instructed to presented to ED for sodium of 120 and need for correction.  Work up has revealed anasarca secondary to newly diagnosed cirrhosis with overlying left lower leg cellulitis due to skin breakdown from edema with worsening hyponatremia suspected secondary to anasarca degree present.  Patient  has been aggressively diuresed with sodium improving back to previous baseline range and transitioned to PO diuretics with weight stability but blood pressure intolerance and mild BELA occurring due to hypoperfusion and patient has subsequently been started on midodrine TID with ongoing Lasix and spironolactone with weight and edema stable, renal function improving and blood pressures stable in the normal range.  MELD-Na scores have been 22-24 during this stay and patient is at high risk of decompensation going forward, especially is he does not stop drinking alcohol completely.  Emphasized this to patient on many conversations the importance of complete alcohol cessation and he verbalizes understanding and a desire to stop as he doesn't want to damage his liver any further.  Declines chemical dependency assessment during this stay and feels he will be able to do this with the resources he is already connected with.  He will have close outpatient follow up in 2 days with his PCP for re-evaluation and ongoing management     Principal Problem:    Acute on Chronic Hyponatremia    Assessment: Patient's baseline sodium appears to be 126-128 over the past few years but no outpatient work up as to etiology appears to have been performed.  Patient does have known alcohol dependence and is also on lisinopril which both may contribute.  Currently patient's sodium decreased to 116-120 in context of apparent volume overload/anasarca in patient 18 lbs up with pitting edema up to lower abdomen and concern for underlying liver impairment.  Now appears to be back to previous baseline as patient if getting to suspected dry weight (156 lbs)    Plan:   -discharge with Lasix 20 mg BID and spironolactone 12.5 mg daily as well as 1500 mL fluid restriction to help keep extra weight off and enable stability of sodium  -will need close outpatient follow up to ensure ongoing stability     Active Problems:    Anasarca    Cirrhosis on abdominal  ultrasound    Assessment: patient gainied weight slowly over the past 2-3 months and has pitting edema up to his lower abdominal wall, concern for ascites on abdominal exam as well.  Given history and current lab work, concern is for developing liver disease from prolonged alcohol use, which abdominal ultrasound confirming cirrhosis appearance.   BNP is negative, chest x-ray is clear making cardiac disease less likely, especially given patient's clinical history.  Patient's weight did decrease with IV diuresis and patient was transitioned to PO diuretics yesterday however blood pressures were low normal with MAPs less than 65 and BELA has developed with fluid bolus given last evening and weight now up almost 2 lbs from yesterday morning.  Patient did have a paracentesis x1 during this stay with almost 4L of fluid removed.  Weight has now stabilized on Lasix 20 mg BID and spironolactone 12.5 mg daily with blood pressures stabilizing after initiation of midodrine 5 mg TID with renal function trending downward.   MELD scores 22-24 during this stay     Plan:   -discharge with ongoing midodrine 5 mg TID to help improve blood pressures and allow for ongoing diuresis efforts in home setting.  Hopefully will be able to wean down and off of this going forward.    -continue Lasix 20 mg BID and spironolactone 12.5 mg daily   -Continue strict I/O with fluid restriction of 1,500 mL   -daily weights  -close outpatient follow up with PCP  -ongoing outpatient monitoring for stability of liver disease with consideration of hepatology referral going forward  -emphasized importance of complete alcohol cessation       Acute kidney injury    Assessment:  Patient renal function was normal at admission but did peak of 1.45 with ongoing aggressive diuresis in combination with allowing hypoperfusion (MAPs consistently under 65 even though systolic blood pressures above 90).  Blood pressures have now stabilized with PO midodrine and renal  function improving into the 1.2 range at time of discharge       Plan:    -will continue with midodrine and ongoing diuretics as above at time of discharge   -follow up with PCP closely with ongoing monitoring of renal function recommended       Cellulitis of left lower extremity    Assessment: developing in the past few days prior to admission with notably warmth, tenderness, swelling compared to right lower leg. Patient did get a prescription for doxycycline at clinic appointment on day of presentation but has not yet started it.  Patient started on Ancef with symptoms improving and patient was transitioned to Keflex on 8/22    Plan:   -will continue with Keflex at time of discharge and monitor for complete resolution in outpatient setting       Hypomagnesemia    Assessment: magnesium is 1.2 on presentation.  On review, patient has had known low magnesium levels in the past and is supposed to be taking daily Mag ox supplementation 400 mg daily but patient is unsure if he has been taking recently.  Has been started on replacement protocol supplementation with magnesium continues to be slightly low but much improved.      Plan:   -discharge on mag oxide 400 mg BID supplementation at discharge with close outpatient follow up      Newly diagnosis cirrhosis     Elevated LFTs    Elevated INR    Thrombocytopenia (H24)    Anemia    Assessment: patient has elevated AST (2:1 ratio with ALT), total bilirubin (2.9), INR (1.46) and not on blood thinners, and new thrombocytopenia of 108 and anemia of 10.7 in patient with frequent heavy alcohol use, limited only by [employer] testing required by law due to previous DUI.  Now has new anasarca/weight gain in combination with this constellations of lab abnormalities concerning for liver disease such as cirrhosis or alcoholic hepatitis which was confirmed but abdominal ultrasound     Plan:   -continue with diuresis efforts as above  -will need outpatient follow up with PCP in near  future for further monitoring with consideration of hepatology referral going forward.       Alcohol dependence with unspecified alcohol-induced disorder (H)    Assessment: patient had a DWI approximatley 10 years ago and reports still being in a monitoring program following this.  His next testing is tomorrow morning (he has to be in [place] at the testing site between 9-11 am) and he reports he cannot miss this appointment or his program monitoring would be extended another 3 years.  His last drink was yesterday at noon as he has learned when to stop drinking in order to have a negative test during these appointments.  He reports he drinks between 3-12 beers a day on a days he can drink and it varies from week to week how many days he can drink depending on how often he needs to test.  He does not feel that his drinking is a problem for him and he will be able to quit without difficulty     Plan:   -patient will need close outpatient monitoring and extensive conversations going forward about his alcohol use and his options as patient will allow and needs       Memory impairment - suspected and ruled out    Assessment:  since admission patient has seem confused about the plan despite several discussions of new diagnosis and goals during this stay in initial days however was improved as sodium returned to previous normal.  SLUMS normal (27/30) and patient appears more alert and understanding overall    Plan:  -continue supportive care as needed       Benign essential hypertension    Assessment: patient is on lisinopril 20 mg daily and Toprol  mg daily at baseline but blood pressures were low normal to hypotensive with diuresis and home regimen has been held since admission to allow for more aggressive diuresis to occur.      Plan:   -Discontinue lisinopril and Toprol XL currently given need for ongoing aggressive diuresis and midodrine at time of discharge       Hyperlipidemia LDL goal <100    Assessment: on  pravastatin 40 mg daily    Plan:   -continue home regimen and monitor stability of liver labs in outpatient setting       Tobacco abuse disorder    Assessment: patient currently is smoking approximately 10 cigarettes a day.  Has rash allergy to the nicotine patches, cannot chew gum due to his dentures but is able to use the lozenges for cravings without side effects.  He has been on Chantix with good results but co-pay was too expensive so he just went back to cigarettes again because it was cheaper.      Plan:   -Nicotine lozenges as needed for cravings if patient decides to quit smoking going forward       Gastroesophageal reflux disease without esophagitis    Assessment: patient uses his omeprazole every other day and was on Protonix during this stay due to hospital formulary     Plan:   -restart omeprazole at discharge     Consultations This Hospital Stay   OCCUPATIONAL THERAPY ADULT IP CONSULT  OCCUPATIONAL THERAPY ADULT IP CONSULT  CARE MANAGEMENT / SOCIAL WORK IP CONSULT    Code Status   Full Code    Time Spent on this Encounter   I, Meghana Chi MD, personally saw the patient today and spent greater than 30 minutes discharging this patient.       Meghana Chi MD  [address] SURGICAL  [address] [place] [address]  Phone: [phone]  ______________________________________________________________________    Physical Exam   Vital Signs: Temp: 98.2  F (36.8  C) Temp src: Oral BP: 131/62 Pulse: (!) 121   Resp: 16 SpO2: 99 % O2 Device: None (Room air)    Weight: 156 lbs 11.2 oz  Constitutional: awake, alert, cooperative, no apparent distress, and appears stated age  Respiratory: No increased work of breathing, good air exchange, clear to auscultation bilaterally, no crackles or wheezing  Cardiovascular: RRR  GI: bowel sounds are present, abdomen obese but non-distended and soft   Skin: patient still has small erythema on left upper foot but  ongoing improvement daily   Musculoskeletal: trace pitting edema in bilateral feet and lower legs   Neurologic: Awake, alert, oriented to name, place and situation        Primary Care Physician   Josias Ch    Discharge Orders      Reason for your hospital stay    Cirrhosis (scarring on your liver most commonly caused by alcohol use) which caused fluid build up in your abdomen and legs.  This swelling caused the skin to break in your left lower leg and an infection to form (called a cellulitis).  You are improving and are on new medications to help keep the fluid off of your body (furosemide and spironolactone) and a medication to help your blood pressures tolerate getting the fluid off (midodrine).  You are also leaving with the rest of the antibiotic for your leg infection (keflex/cephalexin).  You will need to continue to avoid all alcohol and take the supplements of magnesium, potassium and phosphorous to help your body nutritionally improve going forward.  Please follow up with Dr. Ch on Monday as scheduled and enjoy going home!     Follow-up and recommended labs and tests     Follow up with primary care provider, Josias Ch, on Monday as scheduled for hospital follow- up.  The following labs/tests are recommended: complete metabolic panel, magnesium, phosphorous.     Activity    Your activity upon discharge: activity as tolerated     Diet    Follow this diet upon discharge: Regular diet, no alcohol       Significant Results and Procedures   Results for orders placed or performed during the hospital encounter of 08/19/24   US Abdomen Limited    Narrative    US ABDOMEN LIMITED 8/19/2024 1:59 PM    CLINICAL HISTORY: New elevated LFTs, appearance of ascites  TECHNIQUE: Limited abdominal ultrasound.    COMPARISON: Chest CT 4/26/2024    FINDINGS:    GALLBLADDER: The gallbladder is normal. No gallstones, wall  thickening, or pericholecystic fluid. Negative sonographic Leblanc's  sign.    BILE DUCTS: There  is no intrahepatic biliary dilatation. The common  duct measures 4 mm.    LIVER: Nodular contour of the liver with widening of the fissures.    RIGHT KIDNEY: No hydronephrosis.    PANCREAS: The pancreas is largely obscured by overlying gas.    Moderate ascites, not definitely visualized on prior chest CT.      Impression    IMPRESSION:  1.  Morphologic changes suggestive of cirrhosis with moderate volume  ascites.    PILO UP MD         SYSTEM ID:  [id number]   XR Chest Port 1 View    Narrative    EXAM: XR CHEST PORT 1 VIEW  LOCATION: [facility]  DATE: 8/20/2024    INDICATION: sob  COMPARISON: Chest radiograph 8/19/2024. CT chest 4/26/2024.      Impression    IMPRESSION: Negative chest.   US Paracentesis with Albumin    Narrative    US PARACENTESIS WITH ALBUMIN 8/21/2024 12:02 PM    CLINICAL HISTORY: new cirrhosis with acies diagnosis with severe  volume overload    PROCEDURE: Informed consent obtained. Time out performed. The abdomen  was prepped and draped in a sterile fashion. Less than 10 mL of 1%  lidocaine was infused into local soft tissues. A 5 [place] catheter  system was introduced into the abdominal ascites under ultrasound  guidance.    3.82 liters of clear fluid were removed and sent to lab if requested.    Patient tolerated procedure well.    Ultrasound imaging was obtained and placed in the patient's permanent  medical record.      Impression    IMPRESSION:  1.  Status post ultrasound-guided paracentesis.    NOEMY FIGUEROA MD         SYSTEM ID:  [id number]       Discharge Medications   Current Discharge Medication List        START taking these medications    Details   cephALEXin (KEFLEX) 250 MG capsule Take 1 capsule (250 mg) by mouth every 6 hours for 4 days.  Qty: 16 capsule, Refills: 0    Associated Diagnoses: Cellulitis of left lower extremity      midodrine (PROAMATINE) 5 MG tablet Take 1 tablet (5 mg) by mouth 3 times daily.  Qty: 90 tablet, Refills: 0     Associated Diagnoses: Other specified hypotension      potassium phosphate, monobasic, (K-PHOS) 500 MG tablet Take 1 tablet (500 mg) by mouth 2 times daily.  Qty: 60 tablet, Refills: 0    Associated Diagnoses: Hypokalemia; Hypophosphatemia      spironolactone (ALDACTONE) 25 MG tablet Take 0.5 tablets (12.5 mg) by mouth daily.  Qty: 30 tablet, Refills: 0    Associated Diagnoses: Alcoholic cirrhosis of liver with ascites (H)           CONTINUE these medications which have CHANGED    Details   furosemide (LASIX) 20 MG tablet Take 1 tablet (20 mg) by mouth 2 times daily.  Qty: 60 tablet, Refills: 0    Associated Diagnoses: Rash; Edema, unspecified type; Other hypervolemia; Cellulitis, unspecified cellulitis site      magnesium oxide (MAG-OX) 400 MG tablet Take 1 tablet (400 mg) by mouth 2 times daily.  Qty: 100 tablet, Refills: 0    Associated Diagnoses: Hypomagnesemia           CONTINUE these medications which have NOT CHANGED    Details   clotrimazole-betamethasone (LOTRISONE) 1-0.05 % external lotion Apply topically 2 times daily  Qty: 30 mL, Refills: 3    Associated Diagnoses: Seborrheic dermatitis      diclofenac (VOLTAREN) 1 % topical gel Apply 4 g topically 4 times daily  Qty: 150 g, Refills: 1    Associated Diagnoses: Medial tibial stress syndrome, unspecified laterality, initial encounter      EPINEPHrine (ANY BX GENERIC EQUIV) 0.3 MG/0.3ML injection 2-pack Inject 0.3 mLs (0.3 mg) into the muscle as needed for anaphylaxis  Qty: 2 each, Refills: 1    Associated Diagnoses: Allergy to bee sting      omeprazole (PRILOSEC) 40 MG DR capsule TAKE 1 CAPSULE BY MOUTH ONCE DAILY TAKE  30-60  MINUTES  BEFORE  A  MEAL  Qty: 90 capsule, Refills: 3    Comments: Profile Rx: patient will contact pharmacy when needed  Associated Diagnoses: Gastroesophageal reflux disease without esophagitis      pravastatin (PRAVACHOL) 40 MG tablet Take 1 tablet (40 mg) by mouth daily  Qty: 90 tablet, Refills: 3    Associated Diagnoses:  Hyperlipidemia LDL goal <130           STOP taking these medications       doxycycline hyclate (VIBRAMYCIN) 100 MG capsule Comments:   Reason for Stopping:         lisinopril (ZESTRIL) 20 MG tablet Comments:   Reason for Stopping:         metoprolol succinate ER (TOPROL XL) 200 MG 24 hr tablet Comments:   Reason for Stopping:             Allergies   Allergies   Allergen Reactions    Hctz [Hydrochlorothiazide] Rash    Penicillins Hives    Roxicet [Oxycodone-Acetaminophen] Hives

## 2024-08-24 NOTE — PLAN OF CARE
Goal Outcome Evaluation:      Plan of Care Reviewed With: patient    Overall Patient Progress: improving    Outcome Evaluation: A&Ox4. denies pain. up independent with FWW. mag and K replaced this AM. compliant with FR. voiding, having bowel movements. eager to discharge.
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
## 2024-08-26 NOTE — DISCHARGE INSTRUCTIONS
Continue medications as prescribed.  Avoid all alcohol.  Return to the ER if new or worsening symptoms.  It was a pleasure to see you today.

## 2024-08-26 NOTE — PROGRESS NOTES
Clinic Care Coordination Contact    Situation: Patient chart reviewed by care coordinator.    Background: IP to home TCM hand off received.     Assessment: patient was taken back to ER today by . Please see today's telephone encounter.    Plan/Recommendations: CC RN will review chart in 1-2 business days.     Birdie Ibarra RN, BSN, PHN Care Coordinator  [place] [place]   Phone: [phone]
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
## 2024-08-26 NOTE — MEDICATION SCRIBE - ADMISSION MEDICATION HISTORY
"Medication Scribe Admission Medication History    Admission medication history is complete. The information provided in this note is only as accurate as the sources available at the time of the update.    Information Source(s): Patient and CareEverywhere/SureScripts via in-person    Pertinent Information: patient says he does not take any medication that is 5mg, so I put \"not started\" for midodrine. He said MD discontinued pravastatin, and he does not take potassium. I changed spironolactone to a full tablet because patient states he does not split any tablets in half.    Changes made to PTA medication list:  Added: None  Deleted: pravachol, potassium  Changed: spironolactone to 25mg dose    Allergies reviewed with patient and updates made in EHR: yes    Medication History Completed By: DEE DEE OWENS 8/26/2024 12:48 PM    PTA Med List   Medication Sig Note Last Dose    cephALEXin (KEFLEX) 250 MG capsule Take 1 capsule (250 mg) by mouth every 6 hours for 4 days. 8/26/2024: Treating foot infection 8/25/2024 at hs    clotrimazole-betamethasone (LOTRISONE) 1-0.05 % external lotion Apply topically 2 times daily (Patient taking differently: Apply topically 2 times daily as needed (skin irritation).)  Unknown at unkn    diclofenac (VOLTAREN) 1 % topical gel Apply 4 g topically 4 times daily (Patient taking differently: Apply 4 g topically 4 times daily as needed for moderate pain.)  Unknown at unkn    EPINEPHrine (ANY BX GENERIC EQUIV) 0.3 MG/0.3ML injection 2-pack Inject 0.3 mLs (0.3 mg) into the muscle as needed for anaphylaxis  Past Month at NEEDS    furosemide (LASIX) 20 MG tablet Take 1 tablet (20 mg) by mouth 2 times daily.  8/25/2024 at pm    magnesium oxide (MAG-OX) 400 MG tablet Take 1 tablet (400 mg) by mouth 2 times daily.  8/25/2024 at hs    midodrine (PROAMATINE) 5 MG tablet Take 1 tablet (5 mg) by mouth 3 times daily.  Unknown at not started?    omeprazole (PRILOSEC) 40 MG DR capsule TAKE 1 CAPSULE BY MOUTH " ONCE DAILY TAKE  30-60  MINUTES  BEFORE  A  MEAL (Patient taking differently: Take 40 mg by mouth daily (before supper). TAKE 1 CAPSULE BY MOUTH ONCE DAILY TAKE  30-60  MINUTES  BEFORE  A  MEAL)  8/25/2024 at supper    spironolactone (ALDACTONE) 25 MG tablet Take 0.5 tablets (12.5 mg) by mouth daily. (Patient taking differently: Take 25 mg by mouth daily.)  8/25/2024 at am

## 2024-08-26 NOTE — ED NOTES
Pt requested to call dad. He could not remember the phone number so writer had to dial number for him.

## 2024-08-27 NOTE — TELEPHONE ENCOUNTER
Clinic RN: Please contact patient because this medication was prescribed for an acute condition. Confirm current symptoms/need for medication and possible need for appointment. If necessary, document reason and route refill encounter to provider for approval or denial.    Mary Mann, SOSAN, RN

## 2024-08-27 NOTE — TELEPHONE ENCOUNTER
Medication Question or Refill    Contacts       Contact Date/Time Type Contact Phone/Fax    08/27/2024 01:38 PM CDT Phone (Incoming) Tiny Alvarez (Mother) [phone] (H)            What medication are you calling about (include dose and sig)?: Cephalexin 250mg     Preferred Pharmacy:   [address] [address]  Phone: [phone] Fax: [phone]      Controlled Substance Agreement on file:   CSA -- Patient Level:    CSA: None found at the patient level.       Who prescribed the medication?: PCP    Do you need a refill? Yes    When did you use the medication last? 08/27/24    Patient offered an appointment? Yes: 9/18/24    Do you have any questions or concerns?  Wants them refilled today per patient      Okay to leave a detailed message?: Yes at Cell number on file:    Telephone Information:   Mobile [phone]

## 2024-08-27 NOTE — TELEPHONE ENCOUNTER
Patient misunderstood the directions, thought he was supposed to have more medication.  Patient does not need a refill on his antibiotic.    Dang Ballard XRO/
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
## 2024-08-28 NOTE — TELEPHONE ENCOUNTER
Patient called and documented in telephone encounter dated 08/27/2024. Patient does not need medication.     Copied from telephone encounter.     Dang Ballard     KE    8/27/24  2:29 PM  Note  Patient misunderstood the directions, thought he was supposed to have more medication.  Patient does not need a refill on his antibiotic.     Dang Ballard XRO/          Roly Young RN on 8/28/2024 at 11:16 AM

## 2024-09-18 NOTE — TELEPHONE ENCOUNTER
----- Message from Josias Ch sent at 9/18/2024 11:28 AM CDT -----  Please call him labs are about the same. Electrolytes are a little low, magnesium is low. Make sure he is taking the magnesium.  Needs to limit his fluid intake to 1.2 liters a day, reduce his water.   Recheck labs next Monday.
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
## 2024-09-19 NOTE — TELEPHONE ENCOUNTER
[facility] to have patient call back to go over results.   Zoe Rdz, VF 9/19/2024          ----- Message from Josias Ch sent at 9/18/2024 11:28 AM CDT -----  Please call him labs are about the same. Electrolytes are a little low, magnesium is low. Make sure he is taking the magnesium.  Needs to limit his fluid intake to 1.2 liters a day, reduce his water.   Recheck labs next Monday.

## 2024-09-19 NOTE — LETTER
September 20, 2024      Isabel Alvarez  [address] [facility] [address]                      Dear ,    We are writing to inform you of your test results.    Test results indicate you may require additional follow up, see comment below.  Your labs are about the same. Electrolytes are a little low, magnesium is low. Ensure you are taking magnesium.  It is recommended to limit fluid intake to 1.2 liters a day, decrease your water intake.   Please give us a call to schedule your lab follow up.     Resulted Orders   CBC with platelets   Result Value Ref Range    WBC Count 5.1 4.0 - 11.0 10e3/uL    RBC Count 2.91 (L) 4.40 - 5.90 10e6/uL    Hemoglobin 9.7 (L) 13.3 - 17.7 g/dL    Hematocrit 26.9 (L) 40.0 - 53.0 %    MCV 92 78 - 100 fL    MCH 33.3 (H) 26.5 - 33.0 pg    MCHC 36.1 31.5 - 36.5 g/dL    RDW 14.9 10.0 - 15.0 %    Platelet Count 91 (L) 150 - 450 10e3/uL   Comprehensive metabolic panel (BMP + Alb, Alk Phos, ALT, AST, Total. Bili, TP)   Result Value Ref Range    Sodium 125 (L) 135 - 145 mmol/L    Potassium 3.7 3.4 - 5.3 mmol/L    Carbon Dioxide (CO2) 22 22 - 29 mmol/L    Anion Gap 13 7 - 15 mmol/L    Urea Nitrogen 36.4 (H) 8.0 - 23.0 mg/dL    Creatinine 2.08 (H) 0.67 - 1.17 mg/dL    GFR Estimate 34 (L) >60 mL/min/1.73m2      Comment:      eGFR calculated using 2021 CKD-EPI equation.    Calcium 8.9 8.8 - 10.4 mg/dL      Comment:      Reference intervals for this test were updated on 7/16/2024 to reflect our healthy population more accurately. There may be differences in the flagging of prior results with similar values performed with this method. Those prior results can be interpreted in the context of the updated reference intervals.    Chloride 90 (L) 98 - 107 mmol/L    Glucose 139 (H) 70 - 99 mg/dL    Alkaline Phosphatase 167 (H) 40 - 150 U/L    AST 57 (H) 0 - 45 U/L    ALT 24 0 - 70 U/L    Protein Total 7.0 6.4 - 8.3 g/dL    Albumin 2.7 (L) 3.5 - 5.2 g/dL    Bilirubin Total 2.4 (H) <=1.2  mg/dL   INR   Result Value Ref Range    INR 1.47 (H) 0.85 - 1.15   Magnesium   Result Value Ref Range    Magnesium 1.4 (L) 1.7 - 2.3 mg/dL   Ammonia   Result Value Ref Range    Ammonia 87 (H) 16 - 60 umol/L       If you have any questions or concerns, please call the clinic at [phone].      Sincerely,      [facility] Team

## 2024-09-20 NOTE — TELEPHONE ENCOUNTER
"Mother, Tiny, (consent to communicate confirmed) called reporting \"he needs his stomach pumped\" regarding patient. Was able to clarify that they saw Dr Ch 2 days ago in clinic, recommended a paracentesis, scheduled for October 1st. Also, per notes, \"If he needs this more urgently with shortness of breath he can go to the emergency room.\" Denied needing emergency care or other red flag symptoms.     Per this recommendation, advised that patient should go to the ER now. She verbalized understanding and reported that they will bring patient to the ER now.     Jeannette Bernstein, RN, BSN    "

## 2024-09-20 NOTE — ED TRIAGE NOTES
Pt here with father, Rajat for worsening abdominal distention over the last few days. Hx alcoholic cirrhosis. Paracentesis scheduled for 10/1 but worsening pain and pressure prompting ED visit at the direction of Dr. Ch. Rajat is leaving, but can be reached at phone number on demographics sheet under his wife Tiny to come pick patient up at discharge if needed.     Triage Assessment (Adult)       Row Name 09/20/24 1607          Triage Assessment    Airway WDL WDL        Respiratory WDL    Respiratory WDL WDL        Skin Circulation/Temperature WDL    Skin Circulation/Temperature WDL X  Jaundice        Cardiac WDL    Cardiac WDL WDL        Peripheral/Neurovascular WDL    Peripheral Neurovascular WDL WDL        Cognitive/Neuro/Behavioral WDL    Cognitive/Neuro/Behavioral WDL X        [place] Coma Scale    Best Eye Response 4-->(E4) spontaneous     Best Motor Response 6-->(M6) obeys commands     Best Verbal Response 4-->(V4) confused     [place] Coma Scale Score 14

## 2024-09-20 NOTE — ED PROVIDER NOTES
"  History     Chief Complaint   Patient presents with    Bloated       HPI  Isabel Alvarez is a 68 year old male who presents to the emergency department complaining of abdominal distension. The patient is confused on arrival and a poor historian but apparently was dropped off by his 90-year-old father because his abdomen is very distended from ascites.  Patient states he has liver disease and needs his stomach drained.  Patient denies any pain in the abdomen.  Denies any shortness of breath.  Denies any chest pain.  Denies alcohol use recently.  Patient states \"I guess I am pretty confused.\"        Allergies:  Allergies   Allergen Reactions    Bee Venom Anaphylaxis    Hctz [Hydrochlorothiazide] Rash    Penicillins Hives    Roxicet [Oxycodone-Acetaminophen] Hives       Problem List:    Patient Active Problem List    Diagnosis Date Noted    Acute kidney failure, unspecified (H) 08/23/2024     Priority: Medium    Coagulopathy (H24) 08/22/2024     Priority: Medium    Hypokalemia 08/22/2024     Priority: Medium    Alcoholic cirrhosis of liver with ascites (H) 08/20/2024     Priority: Medium    Alcohol dependence with unspecified alcohol-induced disorder (H) 08/19/2024     Priority: Medium    Edema, unspecified type 08/19/2024     Priority: Medium    Hypomagnesemia 08/19/2024     Priority: Medium    Hyponatremia 08/19/2024     Priority: Medium    Elevated LFTs 08/19/2024     Priority: Medium    Cellulitis of left lower extremity 08/19/2024     Priority: Medium    Anasarca 08/19/2024     Priority: Medium    Elevated INR 08/19/2024     Priority: Medium    Thrombocytopenia (H24) 08/19/2024     Priority: Medium    Anemia 08/19/2024     Priority: Medium    Gastroesophageal reflux disease without esophagitis 08/19/2024     Priority: Medium    Tobacco abuse disorder 12/15/2017     Priority: Medium    Blind left eye 06/23/2017     Priority: Medium    Dupuytren's contracture of hand 06/23/2017     Priority: Medium    Benign " "essential hypertension 06/23/2017     Priority: Medium    Hyperlipidemia LDL goal <100 06/23/2017     Priority: Medium    Old myocardial infarction 06/23/2017     Priority: Medium        Past Medical History:    No past medical history on file.    Past Surgical History:    Past Surgical History:   Procedure Laterality Date    RELEASE DUPUYTRENS CONTRACTURE Right 11/12/2021    Procedure: RELEASE, DUPUYTREN CONTRACTURE, right ring finger;  Surgeon: Shubham Eng MD;  Location: [facility] OR       Family History:    No family history on file.    Social History:  Marital Status:  Single [1]  Social History     Tobacco Use    Smoking status: Some Days     Current packs/day: 0.50     Average packs/day: 0.5 packs/day for 50.0 years (25.0 ttl pk-yrs)     Types: Cigarettes    Smokeless tobacco: Never   Vaping Use    Vaping status: Never Used   Substance Use Topics    Alcohol use: Yes     Comment: 2 a day    Drug use: No        Medications:    No current outpatient medications on file.        Review of Systems  Limited review of systems as patient is very poor historian.      Physical Exam   BP: 136/78  Pulse: (!) 129  Temp: 98.2  F (36.8  C)  Resp: 18  Height: 172.7 cm (5' 8\")  Weight: 75.8 kg (167 lb)  SpO2: 99 %      Physical Exam  Constitutional:       Appearance: He is cachectic. He is not toxic-appearing or diaphoretic.   HENT:      Head: Normocephalic.      Nose: Nose normal.      Mouth/Throat:      Mouth: Mucous membranes are moist.   Eyes:      Comments: Right eye with scleral icterus, pupil round and reactive to light.  Left eye is a prosthesis.   Cardiovascular:      Rate and Rhythm: Regular rhythm. Tachycardia present.      Heart sounds: Normal heart sounds.   Pulmonary:      Effort: Pulmonary effort is normal.      Comments: Coarse rales throughout left lung and in right lung and in the left lung base.  Abdominal:      General: Abdomen is protuberant. Bowel sounds are decreased. There is distension.      " Palpations: There is fluid wave.      Tenderness: There is no abdominal tenderness.      Comments: Significant ascites to abdomen noted.   Musculoskeletal:      Comments: Muscle wasting to extremities noted.  Pitting edema to bilateral lower extremities.   Neurological:      Mental Status: He is alert. He is disoriented and confused.   Psychiatric:         Mood and Affect: Mood normal.         Behavior: Behavior is cooperative.             ED Course        Procedures         Results for orders placed or performed during the hospital encounter of 09/20/24 (from the past 24 hour(s))   CBC with platelets differential    Narrative    The following orders were created for panel order CBC with platelets differential.  Procedure                               Abnormality         Status                     ---------                               -----------         ------                     CBC with platelets and d...[[id number]]  Abnormal            Final result               RBC and Platelet Morphology[[id number]]                                                   Please view results for these tests on the individual orders.   Basic metabolic panel   Result Value Ref Range    Sodium 129 (L) 135 - 145 mmol/L    Potassium 4.1 3.4 - 5.3 mmol/L    Chloride 92 (L) 98 - 107 mmol/L    Carbon Dioxide (CO2) 22 22 - 29 mmol/L    Anion Gap 15 7 - 15 mmol/L    Urea Nitrogen 38.0 (H) 8.0 - 23.0 mg/dL    Creatinine 2.07 (H) 0.67 - 1.17 mg/dL    GFR Estimate 35 (L) >60 mL/min/1.73m2    Calcium 8.9 8.8 - 10.4 mg/dL    Glucose 150 (H) 70 - 99 mg/dL   Hepatic function panel   Result Value Ref Range    Protein Total 7.0 6.4 - 8.3 g/dL    Albumin 2.8 (L) 3.5 - 5.2 g/dL    Bilirubin Total 3.4 (H) <=1.2 mg/dL    Alkaline Phosphatase 144 40 - 150 U/L    AST 57 (H) 0 - 45 U/L    ALT 24 0 - 70 U/L    Bilirubin Direct 1.56 (H) 0.00 - 0.30 mg/dL   Lipase   Result Value Ref Range    Lipase 23 13 - 60 U/L   Ammonia   Result Value Ref Range    Ammonia  138 (HH) 16 - 60 umol/L   Magnesium   Result Value Ref Range    Magnesium 1.4 (L) 1.7 - 2.3 mg/dL   Phosphorus   Result Value Ref Range    Phosphorus 3.3 2.5 - 4.5 mg/dL   Blood gas venous   Result Value Ref Range    pH Venous 7.43 7.32 - 7.43    pCO2 Venous 41 40 - 50 mm Hg    pO2 Venous 25 25 - 47 mm Hg    Bicarbonate Venous 27 21 - 28 mmol/L    Base Excess/Deficit Venous 2.5 -3.0 - 3.0 mmol/L    FIO2 21     Oxyhemoglobin Venous 35 (L) 70 - 75 %    O2 Sat, Venous 35.4 (L) 70.0 - 75.0 %    Narrative    In healthy individuals, oxyhemoglobin (O2Hb) and oxygen saturation (SO2) are approximately equal. In the presence of dyshemoglobins, oxyhemoglobin can be considerably lower than oxygen saturation.   CRP inflammation   Result Value Ref Range    CRP Inflammation 14.89 (H) <5.00 mg/L   Nt probnp inpatient (BNP)   Result Value Ref Range    N terminal Pro BNP Inpatient 1,336 (H) 0 - 900 pg/mL   INR   Result Value Ref Range    INR 1.50 (H) 0.85 - 1.15   Partial thromboplastin time   Result Value Ref Range    aPTT 35 22 - 38 Seconds   Ethyl Alcohol Level   Result Value Ref Range    Alcohol ethyl <0.01 <=0.01 g/dL   CBC with platelets and differential   Result Value Ref Range    WBC Count 6.0 4.0 - 11.0 10e3/uL    RBC Count 2.94 (L) 4.40 - 5.90 10e6/uL    Hemoglobin 9.7 (L) 13.3 - 17.7 g/dL    Hematocrit 26.9 (L) 40.0 - 53.0 %    MCV 92 78 - 100 fL    MCH 33.0 26.5 - 33.0 pg    MCHC 36.1 31.5 - 36.5 g/dL    RDW 14.9 10.0 - 15.0 %    Platelet Count 103 (L) 150 - 450 10e3/uL    % Neutrophils 67 %    % Lymphocytes 18 %    % Monocytes 12 %    % Eosinophils 2 %    % Basophils 0 %    % Immature Granulocytes 1 %    NRBCs per 100 WBC 0 <1 /100    Absolute Neutrophils 4.0 1.6 - 8.3 10e3/uL    Absolute Lymphocytes 1.1 0.8 - 5.3 10e3/uL    Absolute Monocytes 0.7 0.0 - 1.3 10e3/uL    Absolute Eosinophils 0.1 0.0 - 0.7 10e3/uL    Absolute Basophils 0.0 0.0 - 0.2 10e3/uL    Absolute Immature Granulocytes 0.0 <=0.4 10e3/uL    Absolute  NRBCs 0.0 10e3/uL   XR Chest Port 1 View    Narrative    EXAM: XR CHEST PORT 1 VIEW  LOCATION: [facility]  DATE: 9/20/2024    INDICATION: liver failure, adventitious lung sounds  COMPARISON: None.      Impression    IMPRESSION: Elevation right hemidiaphragm with atelectasis of the right midlung. Elevation is new. Otherwise negative.       Medications   cefTRIAXone (ROCEPHIN) 2 g vial to attach to  ml bag for ADULTS or NS 50 ml bag for PEDS (0 g Intravenous Stopped 9/20/24 2113)         Assessments & Plan (with Medical Decision Making)  Isabel Alvarez is a 68 year old male with a history of alcoholic cirrhosis of the liver who presented to the ED complaining of abdominal distention.  On arrival he was afebrile.  Tachycardic with normal blood pressure.  He appeared cachectic and frail, mildly jaundiced.  Did not appear significantly ill however.  Exam with significant distention and fluid wave consistent with ascites.  Absolutely no tenderness with deep palpation of the abdomen.  Lungs were coarse throughout as well.  Edema noted to lower extremities.  No evidence of respiratory distress here today.  Patient was very confused while here, repeatedly asking the same questions and stating he felt great.  Unable to provide much history.  I am concerned for encephalopathy related to his liver disease.  IV established and labs drawn for further evaluation.  His labs today demonstrated normal white blood cell count.  CRP mildly elevated at 14.89.  His liver enzymes elevated with an AST of 57, bilirubin of 3.4.  Ammonia also critically high at 138.  Lactulose ordered for treatment of elevated ammonia.  Sodium low at 129, which appears chronic for patient.  His creatinine was up at 2.07 with a BUN of 38.  Despite his third spacing of fluids he does appear to be mildly dehydrated.  Unsure what his oral intake is.  I started him on a slow infusion of 100 mL an hour for 2 hours and then  will start him on TKO fluids for very gentle rehydration.  He will require paracentesis but unfortunately we do not have radiological services at our facility until Tuesday so I think he will require transfer for further management of his hepatic encephalopathy, and ascites.  He did have an elevated BNP of 1300.  Chest x-ray did not show significant pulmonary edema.  He had an elevation of his right hemidiaphragm.  A bed was found available at [facility] and I spoke to hospitalist Dr. Hou regarding this patient's case.  She accepted patient in transfer.  Advised giving 2 g IV Rocephin to cover for possible SBP given his persistent tachycardia here, though agreed that clinically there is low index of suspicion for SBP since he is not tender in the abdomen and white count was normal.  Patient wanted his 92-year-old father to drive him to [facility] but I think it would be best if he went by ambulance for continued medical monitoring.  I did give his parents an update prior to transfer and they requested updates as needed as well.  Their phone number is in his record.  Staffed in the ED with Dr. Vásquez.     I have reviewed the nursing notes.    I have reviewed the findings, diagnosis, plan and need for follow up with the patient.      Discharge Medication List as of 9/20/2024  9:35 PM          Final diagnoses:   Hepatic encephalopathy (H)   Alcoholic cirrhosis of liver with ascites (H)   CHF (congestive heart failure) (H)   Acute kidney injury (H24)     Note: Chart documentation done in part with Dragon Voice Recognition software. Although reviewed after completion, some word and grammatical errors may remain.    9/20/2024   [facility] EMERGENCY DEPT       Cira Espinoza PA-C  09/20/24 3146     Acute alcoholic gastritis without hemorrhage Acute alcoholic gastritis without hemorrhage Acute alcoholic gastritis without hemorrhage Acute alcoholic gastritis without hemorrhage Acute alcoholic gastritis without hemorrhage Acute alcoholic gastritis without hemorrhage

## 2024-09-20 NOTE — ED NOTES
Iv started, blood sent to lab. Pt is resting. Provided warm blanket, call light in reach. VS monitoring.

## 2024-09-20 NOTE — MEDICATION SCRIBE - ADMISSION MEDICATION HISTORY
Medication Scribe Admission Medication History    Admission medication history is complete. The information provided in this note is only as accurate as the sources available at the time of the update.    Information Source(s): Patient, Prescription bottles, and CareEverywhere/SureScripts via in-person    Pertinent Information: patient brought SOME of his bottles in and said he took them all this morning. One of them was an abx from August 24, 2024 (4 day supply). He brought midodrine but it was not on his PTA list so I added it. He brought spironolactone, which was on his list but though he had the bottle he is adamant that was discontinued and he only takes lasix (did not bring that bottle). He declared he still takes metoprolol and lisinopril (no bottles brought, but added as they were on his dispense report and I could not find that they were discontinued. He brought his pravastatin and said he takes it but it was discontinued in his chart. I did not add it. This was a trip.    Changes made to PTA medication list:  Added: lisinopril, metoprolol, midodrine, pravastatin  Deleted: voltaren gel, lotrisone lotion  Changed: None    Allergies reviewed with patient and updates made in EHR: no    Medication History Completed By: DEE DEE OWENS 9/20/2024 6:42 PM    PTA Med List   Medication Sig Note Last Dose    EPINEPHrine (ANY BX GENERIC EQUIV) 0.3 MG/0.3ML injection 2-pack Inject 0.3 mLs (0.3 mg) into the muscle as needed for anaphylaxis  Past Month at [facility]    furosemide (LASIX) 20 MG tablet Take 1 tablet (20 mg) by mouth 2 times daily.  9/20/2024 at am    lisinopril (ZESTRIL) 20 MG tablet Take 20 mg by mouth daily.  9/20/2024 at am    magnesium oxide (MAG-OX) 400 MG tablet Take 1 tablet (400 mg) by mouth 2 times daily.  9/20/2024 at am    metoprolol succinate ER (TOPROL XL) 200 MG 24 hr tablet Take 200 mg by mouth daily.  9/20/2024 at am    midodrine (PROAMATINE) 5 MG tablet Take 5 mg by mouth 3 times daily.  9/20/2024  at am    omeprazole (PRILOSEC) 40 MG DR capsule TAKE 1 CAPSULE BY MOUTH ONCE DAILY TAKE  30-60  MINUTES  BEFORE  A  MEAL (Patient taking differently: Take 40 mg by mouth daily (before supper). TAKE 1 CAPSULE BY MOUTH ONCE DAILY TAKE  30-60  MINUTES  BEFORE  A  MEAL)  9/20/2024 at am    spironolactone (ALDACTONE) 25 MG tablet Take 1 tablet (25 mg) by mouth daily. 9/20/2024: Stopped by patient  at stopped

## 2024-09-21 PROBLEM — K76.82 HEPATIC ENCEPHALOPATHY (H): Status: ACTIVE | Noted: 2024-01-01

## 2024-09-21 NOTE — PROGRESS NOTES
[facility]    Medicine Progress Note - Hospitalist Service    Date of Admission:  9/20/2024    Assessment & Plan     Isabel Alvarez is a 68 year old male admitted on 9/20/2024. He presents with abdominal distension, confusion     Alcoholic cirrhosis with ascites  Hepatic encephalopathy  Alcohol use disorder  Anasarca  GERD  [Lasix 20 mg BID, spironolactone 25 mg daily, omeprazole 40 mg daily, midodrine 5 mg TID]  *recent hospitalization 8/19-24 w/ hyponatremia, anasarca, BELA. During that admission w/ newly dx cirrhosis w/ anasarca. Drinking up until recently despite being monitored.   *returns with abdominal distension and confusion. Confused at transfer but alert and oriented, limited history. Denies pain c/o. In ED tachy HR 120s, other VSS. Ammonia 138. T bili 3.4 (up from previous, increasing). CRP 14.89. AST 57, ALT normal. BNP 1,336. CXR clear.   *MELD-Na 27 w/ ~27-32% 90 day mortality  - GI consult noted, recommend following daily labs, agree with holding diuretic, low-salt diet, continuing ceftriaxone.  - lactulose 20 mg TID  -Continue with gentle IV hydration, daily albumin  - US paracentesis w/ studies in am.  Discussed with IR, will be done later today  - continue PPI  - daily weights     BELA  Baseline creatinine ~0.9-1.0, recent 1.2-1.5. Creatinine on presentation 2.07. on ACE I PTA. UA 8/26 bland. Concern for HRS although with systemic hypertension. Will empirically treat for HRS pending nephrology consult  - repeat UA  - holding diuretics  - hold ACE I, metoprolol as well  -Continue midodrine/ octreotide  - albumin as above.  - nephrology consult consulted, eval     Hypomagnesemia  Chronic hyponatremia  Baseline sodium mid-to-upper 120s. Sodium on presentation 129.   - monitor  - replace Mg per protocol  -A.m. labs pending     Anemia   Thrombocytopenia  Coagulopathy 2/2 above  Hgb 9.7/ plts 103 on presentation. Both roughly stable/ improved. INR 1.5.   - monitor counts/ INR,  a.m. labs pending     HTN  HLD  - holding PTA lisinopril and metoprolol  - prn hydralazine available     COPD  Tobacco use disorder  States currently cutting back  - declines NRT  - requests chantix but will hold on this for now        Diet: Combination Diet Regular Diet Adult    DVT Prophylaxis: Pneumatic Compression Devices  Lizarraga Catheter: Not present  Lines: None     Cardiac Monitoring: None  Code Status: Full Code      Clinically Significant Risk Factors Present on Admission         # Hyponatremia: Lowest Na = 129 mmol/L in last 2 days, will monitor as appropriate    # Hypomagnesemia: Lowest Mg = 1.4 mg/dL in last 2 days, will replace as needed   # Hypoalbuminemia: Lowest albumin = 2.8 g/dL at 9/20/2024  5:03 PM, will monitor as appropriate  # Coagulation Defect: INR = 1.50 (Ref range: 0.85 - 1.15) and/or PTT = 35 Seconds (Ref range: 22 - 38 Seconds), will monitor for bleeding  # Thrombocytopenia: Lowest platelets = 103 in last 2 days, will monitor for bleeding  # Acute Kidney Injury, unspecified: based on a >150% or 0.3 mg/dL increase in last creatinine compared to past 90 day average, will monitor renal function  # Hypertension: Noted on problem list    # Anemia: based on hgb <11           # Financial/Environmental Concerns:           Disposition Plan     Medically Ready for Discharge: Anticipated in 2-4 Days             Campos Kurtz MD  Hospitalist Service  [facility]  Securely message with [employer] (more info)  Text page via [facility] Paging/Directory   ______________________________________________________________________    Interval History     Chart reviewed, discussed with nursing staff and patient was seen this morning.  Reports abdominal discomfort but no pain.  No nausea vomiting or diarrhea.  Feels tired.  Denies dyspnea.    Physical Exam   Vital Signs: Temp: 97.4  F (36.3  C) Temp src: Oral BP: 127/83 Pulse: 109   Resp: 16 SpO2: 94 % O2 Device: None (Room air)    Weight:  163 lbs 9.3 oz    General: Alert awake, appears pallorous, appears comfortable.  HEENT: PERRLA EOMI. Mucosa moist.   Lungs: Bilateral equal air entry. Clear to auscultation, normal work of breathing.   CVS: S1S2 regular, no tachycardia or murmur.   Abdomen: Soft, markedly distended and firm abdomen, no guarding or rigidity or rebound tenderness.  MSK: Bilateral 2+ leg edema  Neuro: Alert awake, oriented to self and place. CN 2-12 normal. Strength symmetrical.  Skin: No rash.       Medical Decision Making       45 MINUTES SPENT BY ME on the date of service doing chart review, history, exam, documentation & further activities per the note.      Data     I have personally reviewed the following data over the past 24 hrs:    6.0  \   9.7 (L)   / 103 (L)     129 (L) 92 (L) 38.0 (H) /  150 (H)   4.1 22 2.07 (H) \     ALT: 24 AST: 57 (H) AP: 144 TBILI: 3.4 (H)   ALB: 2.8 (L) TOT PROTEIN: 7.0 LIPASE: 23     Trop: N/A BNP: 1,336 (H)     Procal: N/A CRP: 14.89 (H) Lactic Acid: N/A       INR:  1.50 (H) PTT:  35   D-dimer:  N/A Fibrinogen:  N/A       Imaging results reviewed over the past 24 hrs:   Recent Results (from the past 24 hour(s))   XR Chest Port 1 View    Narrative    EXAM: XR CHEST PORT 1 VIEW  LOCATION: [facility]  DATE: 9/20/2024    INDICATION: liver failure, adventitious lung sounds  COMPARISON: None.      Impression    IMPRESSION: Elevation right hemidiaphragm with atelectasis of the right midlung. Elevation is new. Otherwise negative.

## 2024-09-21 NOTE — CONSULTS
[facility]  Gastroenterology Consultation         Isabel Alvarez  [address] [facility] [address]  68 year old male    Admission Date/Time: 9/20/2024  Primary Care Provider: Josias Ch  Referring / Attending Physician:  Dr. Kishore Brady    We were asked to see the patient in consultation by Dr. Kishore Brady for evaluation of hepatic encephalopathy and alcoholic liver cirrhosis with concern for HRS.      CC: confusion and abdomina, distention    HPI:  Isabel Alvarez is a 68 year old male with PMHx of alcoholic liver cirrhosis with ascites, hepatic encephalopathy, hypertension admitted on 9/20/2024. He presents with abdominal distension and confusion. He remains confused today telling me he had a paracentesis today- which has not celestina done. He reports he has not had alcohol in 8 months per his report. He seems unclear about how severe his liver is, does not have a gastroenterologist. However, with chart review appears has had alcohol as recent as August.     He denies fever, chills, abdominal pain. He is hungry. Otherwise has no complaints    ROS: A comprehensive ten point review of systems was negative aside from those in mentioned in the HPI.      PAST MED HX:  I have reviewed this patient's medical history and updated it with pertinent information if needed.   Past Medical History:   Diagnosis Date    Alcoholic cirrhosis of liver with ascites (H)     Chronic hyponatremia     Hepatic encephalopathy (H)     HLD (hyperlipidemia)     HTN (hypertension)        MEDICATIONS:   Prior to Admission Medications   Prescriptions Last Dose Informant Patient Reported? Taking?   EPINEPHrine (ANY BX GENERIC EQUIV) 0.3 MG/0.3ML injection 2-pack  Self No No   Sig: Inject 0.3 mLs (0.3 mg) into the muscle as needed for anaphylaxis   furosemide (LASIX) 20 MG tablet   No No   Sig: Take 1 tablet (20 mg) by mouth 2 times daily.   lisinopril (ZESTRIL) 20 MG tablet   Yes No   Sig: Take 20 mg by  mouth daily.   magnesium oxide (MAG-OX) 400 MG tablet   No No   Sig: Take 1 tablet (400 mg) by mouth 2 times daily.   metoprolol succinate ER (TOPROL XL) 200 MG 24 hr tablet   Yes No   Sig: Take 200 mg by mouth daily.   midodrine (PROAMATINE) 5 MG tablet   Yes No   Sig: Take 5 mg by mouth 3 times daily.   omeprazole (PRILOSEC) 40 MG DR capsule  Self No No   Sig: TAKE 1 CAPSULE BY MOUTH ONCE DAILY TAKE  30-60  MINUTES  BEFORE  A  MEAL   Patient taking differently: Take 40 mg by mouth daily (before supper). TAKE 1 CAPSULE BY MOUTH ONCE DAILY TAKE  30-60  MINUTES  BEFORE  A  MEAL   spironolactone (ALDACTONE) 25 MG tablet   No No   Sig: Take 1 tablet (25 mg) by mouth daily.      Facility-Administered Medications: None       ALLERGIES:   Allergies   Allergen Reactions    Bee Venom Anaphylaxis    Hctz [Hydrochlorothiazide] Rash    Penicillins Hives    Roxicet [Oxycodone-Acetaminophen] Hives       SOCIAL HISTORY:  Social History     Tobacco Use    Smoking status: Every Day     Current packs/day: 0.50     Average packs/day: 0.5 packs/day for 50.0 years (25.0 ttl pk-yrs)     Types: Cigarettes    Smokeless tobacco: Never   Vaping Use    Vaping status: Never Used   Substance Use Topics    Alcohol use: Yes     Comment: alcohol use disorder    Drug use: No       FAMILY HISTORY:  No family history on file.    PHYSICAL EXAM:   General  alert, oriented and comfortable  Vital Signs with Ranges  Temp: 97.5  F (36.4  C) Temp src: Oral BP: (!) 118/94 Pulse: 111   Resp: 16 SpO2: 98 % O2 Device: None (Room air)    I/O last 3 completed shifts:  In: -   Out: 700 [Urine:700]    Constitutional: healthy, alert, and no distress   Cardiovascular: negative, PMI normal. No lifts, heaves, or thrills. RRR. No murmurs, clicks gallops or rub  Respiratory: negative, Percussion normal. Good diaphragmatic excursion. Lungs clear  Psychiatric: mentation appears slow and midlly confused and affect normal  Abdomen: Abdomen firm, non-tender. Disteneded. BS  normal. No masses, organomegaly          ADDITIONAL COMMENTS:   I reviewed the patient's new clinical lab test results.   Recent Labs   Lab Test 09/20/24  1703 09/18/24  1026 08/26/24  1059 08/24/24  0549   WBC 6.0 5.1 2.7* 5.0   HGB 9.7* 9.7* 9.4* 8.8*   MCV 92 92 93 93   * 91* 77* 78*   INR 1.50* 1.47*  --  1.68*     Recent Labs   Lab Test 09/20/24  1703 09/18/24  1026 08/26/24  1059   POTASSIUM 4.1 3.7 4.2   CHLORIDE 92* 90* 92*   CO2 22 22 24   BUN 38.0* 36.4* 31.9*   ANIONGAP 15 13 16*     Recent Labs   Lab Test 09/21/24 0212 09/20/24  1703 09/18/24  1026 08/26/24  1302 08/26/24  1059 08/20/24  0535 08/19/24  1447   ALBUMIN  --  2.8* 2.7*  --  2.8*   < >  --    BILITOTAL  --  3.4* 2.4*  --  2.2*   < >  --    ALT  --  24 24  --  14   < >  --    AST  --  57* 57*  --  121*   < >  --    PROTEIN Negative  --   --  Negative  --   --  Negative   LIPASE  --  23  --   --  23  --   --     < > = values in this interval not displayed.       I reviewed the patient's new imaging results.        CONSULTATION ASSESSMENT AND PLAN:    Isabel Alvarez is a 68 year old male with PMHx of alcoholic liver cirrhosis with ascites, hepatic encephalopathy, hypertension admitted on 9/20/2024. He presents with abdominal distension, confusion.    Alcoholic cirrhosis of liver with ascites (H)  Hepatic encephalopathy (H)  MELD 27-28 with ~30% mortality rate in 90 days  Ammonia 138- pending repeat (tends to run around 80-90)  Tbili 3.4- pending repeat. Cr 2.07 , baseline 1.2- concern for hepatorenal syndrome- hold diuretics  Albumin 2.8 and started on replacement  Hemoglobin 9.7, Platelets 103- at baseline  INR 1.50- baseline  Paracentesis ordered- started on ceftriaxone  Decompensation may be due to ongoing alcohol use vs concern for SBP. Pending paracentesis for diagnostic/therapeutic reasons    - daily labs CMP, CRP, INR, CBC, Ammonia  - paracentesis today  - Cr 2.07 , baseline 1.2- concern for hepatorenal syndrome- hold diuretics-  continue albumin, octreotide and midodrine  - continue lactulose 20 g TID and titrate for 2-3 stools a day  - continue ceftriaxone  - low sodium diet  - not a candidate for liver transplant with alcohol use as recent as August per chart review        [facility].  Office: [phone]  Cell : [phone] (Dr. Abad)  Cell: [phone] (Tanna Murillo PA-C)

## 2024-09-21 NOTE — CONSULTS
[facility]    Nephrology Consultation     Date of Admission:  9/20/2024    Assessment & Plan     Isabel Alvarez is a 68 year old male who was admitted on 9/20/2024.     Assessment:  1) progressive acute kidney injury:  Cannot rule out some component of abdominal compartment syndrome with a extreme ascites.  Otherwise he potentially may have progressive BELA from hepatorenal syndrome.  Will check a spot urine sodium otherwise agree with treatment started on admission with octreotide, albumin and midodrine.  Discussed with patient potential gravity of situation with poor prognosis if we cannot reverse this.    2) hyponatremia: Hypervolemic in nature, admission at 129.  Secondary to hypervolemia.  May improve if we are able to have some diuresis.  Otherwise  will start a fluid restriction.    3) cirrhosis, second alcohol with associated portal hypertension.  Noted GI consult.  Noted recent alcohol use.  Important to note he is not a transplant candidate and if he progresses regarding renal failure, would likely not be a dialysis candidate.  Extremely distended abdomen, planned paracentesis.  On ceftriaxone for potential SBP.    Other:  Anemia, hemoglobin same as last admission with a value of 9.7   Coagulopathy  Hepatic encephalopathy    Plan/Recs:  1) checking spot urine sodium  2) agree with current interventions including midodrine 5 mg 3 times daily, octreotide 100 mcg 3 times daily, IV albumin  3) fluid restriction 1.2 L/day  4) due to lack of any prior renal imaging, we will get a formal renal ultrasound.  5) needs daily BMPs.  Avoidance of contrast, nephrotoxins, NSAIDs as able    Peewee Murillo DO  [facility] - Nephrology  [phone]  --------------------------------------------------------------------------------------------  Reason for Consult     I was asked to see the patient for acute kidney injury      History is obtained from the patient and chart review.   "    History of Present Illness     Isabel Alvarez is a 68 year old male who presents with progressive abdominal distention.  Known alcoholic cirrhosis with ascites.  Had admission last month due to edema and cellulitis.  He had worsened hyponatremia and he was diuresed and discharged on p.o. diuretics.  Had an acute kidney injury at that time with admission creatinine of 0.84 and 1.55 but stable on discharge.    On discharge his lisinopril was held although notes that he still takes metoprolol and lisinopril.  Discharged also on midodrine to raise his blood pressure allow for diuresis and to treat possible hepatorenal syndrome.    Patient lives with his parents who are 88 and 90 years old.  Reports that they take care of each other.  Having worsening abdominal distention, had planned paracentesis but with this not scheduled in the near future, was told to come to the emergency room by his primary provider.  Some recent bladder emptying issues, Lizarraga catheter placed today.  Denies any subsequent pain with his abdominal distention and denies any significant dyspnea either.  No recent new rashes, arthralgias noted.  Legs are edematous and they \"popped up\" on him recently.  Denies any NSAIDs or OTCs at home.    Past Medical History   I have reviewed this patient's medical history and updated it with pertinent information if needed.   Past Medical History:   Diagnosis Date    Alcoholic cirrhosis of liver with ascites (H)     Chronic hyponatremia     Hepatic encephalopathy (H)     HLD (hyperlipidemia)     HTN (hypertension)        Past Surgical History   I have reviewed this patient's surgical history and updated it with pertinent information if needed.  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 Taking?   EPINEPHrine (ANY BX GENERIC EQUIV) 0.3 MG/0.3ML injection 2-pack More than a month at PRN Self No Yes   Sig: Inject 0.3 mLs (0.3 mg) into the muscle as needed for anaphylaxis   furosemide (LASIX) 20 MG tablet 9/20/2024 at AM  No Yes   Sig: Take 1 tablet (20 mg) by mouth 2 times daily.   lisinopril (ZESTRIL) 20 MG tablet 9/20/2024 at AM  Yes Yes   Sig: Take 20 mg by mouth daily.   magnesium oxide (MAG-OX) 400 MG tablet 9/20/2024 at AM  No Yes   Sig: Take 1 tablet (400 mg) by mouth 2 times daily.   metoprolol succinate ER (TOPROL XL) 200 MG 24 hr tablet 9/20/2024 at AM  Yes Yes   Sig: Take 200 mg by mouth daily.   midodrine (PROAMATINE) 5 MG tablet 9/20/2024 at AM  Yes Yes   Sig: Take 5 mg by mouth 3 times daily.   omeprazole (PRILOSEC) 40 MG DR capsule 9/20/2024 at PM Self No Yes   Sig: TAKE 1 CAPSULE BY MOUTH ONCE DAILY TAKE  30-60  MINUTES  BEFORE  A  MEAL   pravastatin (PRAVACHOL) 40 MG tablet 9/20/2024  Yes Yes   Sig: Take 40 mg by mouth daily.   spironolactone (ALDACTONE) 25 MG tablet Not Taking  No No   Sig: Take 1 tablet (25 mg) by mouth daily.   Patient not taking: Reported on 9/21/2024      Facility-Administered Medications: None     Allergies   Allergies   Allergen Reactions    Bee Venom Anaphylaxis    Hctz [Hydrochlorothiazide] Rash    Penicillins Hives    Roxicet [Oxycodone-Acetaminophen] Hives       Social History   I have reviewed this patient's social history and updated it with pertinent information if needed. Isabel Alvarez  reports that he has been smoking cigarettes. He has a 25 pack-year smoking history. He has never used smokeless tobacco. He reports current alcohol use. He reports that he does not use drugs.    Family History   I have reviewed this patient's family history and updated it with pertinent information if needed.   No family history on file.    Review of Systems   The 10 point Review of Systems is  "negative other than noted in the HPI.     Physical Exam   Temp: 97.5  F (36.4  C) Temp src: Oral BP: (!) 118/94 Pulse: 111   Resp: 16 SpO2: 98 % O2 Device: None (Room air)    Vital Signs with Ranges  Temp:  [97.4  F (36.3  C)-98.2  F (36.8  C)] 97.5  F (36.4  C)  Pulse:  [109-129] 111  Resp:  [16-18] 16  BP: (109-158)/(72-94) 118/94  SpO2:  [92 %-99 %] 98 %  163 lbs 9.3 oz    GENERAL: Frail, appears older than stated age  HEENT:  Normocephalic.   CV: Tachycardic, regular with no murmurs noted.  2-3+ edema bilateral legs  RESP: Clear bilaterally with good efforts  GI: Extremely distended abdomen, tense and mildly tender  MUSCULOSKELETAL: extremities nl - no gross deformities noted  SKIN: no suspicious lesions or rashes, dry to touch  NEURO: Grossly nonfocal  PSYCH: mood good, affect appropriate    Data   BMP  Recent Labs   Lab 09/20/24  1703 09/18/24  1026   * 125*   POTASSIUM 4.1 3.7   CHLORIDE 92* 90*   YUNIOR 8.9 8.9   CO2 22 22   BUN 38.0* 36.4*   CR 2.07* 2.08*   * 139*     [email or web address](phos:4)  CBC)  Recent Labs   Lab 09/20/24  1703 09/18/24  1026   WBC 6.0 5.1   HGB 9.7* 9.7*   HCT 26.9* 26.9*   MCV 92 92   * 91*     Recent Labs   Lab 09/20/24  1703   AST 57*   ALT 24   ALKPHOS 144   BILITOTAL 3.4*   *     Recent Labs   Lab 09/20/24  1703   INR 1.50*     No results found for: \"D2VIT\", \"D3VIT\", \"DTOT\"  Recent Labs   Lab 09/20/24  1703   HGB 9.7*   HCT 26.9*   MCV 92     No results for input(s): \"PTHI\" in the last 168 hours.    "

## 2024-09-21 NOTE — PHARMACY-ADMISSION MEDICATION HISTORY
Admission medication history completed at [facility]. Please see Medication Scribe Admission Medication History note from 9/20/2024.     Please note pravastatin was added to the list as patient reported taking medication and had prescription bottle with him.

## 2024-09-21 NOTE — PLAN OF CARE
Summary:Alcoholic cirrhosis with ascites; Hepatic encephalopathy; Alcohol use disorder; Anasarca; GERD (direct admit from [facility])    DATE & TIME: 9/20-21/24 9800 - 8350    Cognitive Concerns/ Orientation: A&O to self only - has moments of clarity; speech can be illogical and garbled    BEHAVIOR & AGGRESSION TOOL COLOR: Green    CIWA SCORE: N/A    ABNL VS/O2: HTN; Tachycardic - otherwise VSS on RA    MOBILITY: A1GBW    PAIN MANAGMENT: Denies    DIET: Regular    BOWEL/BLADDER: Pt. Unable to void overnight; per MD (Dr. Brady) pt may require intermittent catheterization. Straight cath @ 0200 produced 700 mL of august urine    ABNL LAB/BG: ; Urea 38; Creatinine 2.07; GFR 35; Mg 1.4 (replaced); AST 57; Ammonia 138; Bilirubin 1.56; CRP 14.89; BNP 1336    DRAIN/DEVICES: PIV Infusing NS @ 75mL    TELEMETRY RHYTHM: N/A    SKIN: Scattered bruising; BLE Scattered spider angiomas; at risk for skin tears  TESTS/PROCEDURES: US Paracentesis w/ albumin 9/21    D/C DAY/GOALS/PLACE: TBD    OTHER IMPORTANT INFO: GI consult; Neph (concern for HRS) Consult; PT/OT/SW. Octreotide (sandoSTATIN) SubQ injection 100 mcg TID is in patient bin

## 2024-09-21 NOTE — H&P
[facility]    History and Physical - Hospitalist Service       Date of Admission:  9/20/2024    Assessment & Plan      Isabel Alvarez is a 68 year old male admitted on 9/20/2024. He presents with abdominal distension, confusion    Alcoholic cirrhosis with ascites  Hepatic encephalopathy  Alcohol use disorder  Anasarca  GERD  [Lasix 20 mg BID, spironolactone 25 mg daily, omeprazole 40 mg daily, midodrine 5 mg TID]  *recent hospitalization 8/19-24 w/ hyponatremia, anasarca, BELA. During that admission w/ newly dx cirrhosis w/ anasarca. Drinking up until recently despite being monitored.   *returns with abdominal distension and confusion. Confused at transfer but alert and oriented, limited history. Denies pain c/o. In ED tachy HR 120s, other VSS. Ammonia 138. T bili 3.4 (up from previous, increasing). CRP 14.89. AST 57, ALT normal. BNP 1,336. CXR clear.   *MELD-Na 27 w/ ~27-32% 90 day mortality  - GI consult  - lactulose 20 mg TID  - hold diuretics  - gentle fluids  - US paracentesis w/ studies in am  - continue PPI  - daily weights  - empiric ceftriaxone pending paracentesis    BELA  Baseline creatinine ~0.9-1.0, recent 1.2-1.5. Creatinine on presentation 2.07. on ACE I PTA. UA 8/26 bland. Concern for HRS although with systemic hypertension. Will empirically treat for HRS pending nephrology consult  - repeat UA  - hold diuretics  - hold ACE I, metoprolol  - midodrine/ octreotide  - albumin (start with 1g/kg daily x 2 days)  - nephrology consult    Hypomagnesemia  Chronic hyponatremia  Baseline sodium mid-to-upper 120s. Sodium on presentation 129.   - monitor  - replace Mg per protocol    Anemia   Thrombocytopenia  Coagulopathy 2/2 above  Hgb 9.7/ plts 103 on presentation. Both roughly stable/ improved. INR 1.5.   - monitor counts/ INR    HTN  HLD  - hold PTA lisinopril and metoprolol  - prn hydralazine available    COPD  Tobacco use disorder  States currently cutting back  - declines NRT  -  "requests chantix but will hold on this for now        Diet:  regular  DVT Prophylaxis: Pneumatic Compression Devices  Lizarraga Catheter: Not present  Lines: None     Cardiac Monitoring: None  Code Status:  Full per previous, discuss further when more clear    Clinically Significant Risk Factors Present on Admission         # Hyponatremia: Lowest Na = 129 mmol/L in last 2 days, will monitor as appropriate    # Hypomagnesemia: Lowest Mg = 1.4 mg/dL in last 2 days, will replace as needed   # Hypoalbuminemia: Lowest albumin = 2.8 g/dL at 9/20/2024  5:03 PM, will monitor as appropriate    # Coagulation Defect: INR = 1.50 (Ref range: 0.85 - 1.15) and/or PTT = 35 Seconds (Ref range: 22 - 38 Seconds), will monitor for bleeding  # Thrombocytopenia: Lowest platelets = 103 in last 2 days, will monitor for bleeding  # Acute Kidney Injury, unspecified: based on a >150% or 0.3 mg/dL increase in last creatinine compared to past 90 day average, will monitor renal function  # Hypertension: Noted on problem list    # Anemia: based on hgb <11       # Overweight: Estimated body mass index is 25.39 kg/m  as calculated from the following:    Height as of an earlier encounter on 9/20/24: 1.727 m (5' 8\").    Weight as of an earlier encounter on 9/20/24: 75.8 kg (167 lb).         # Financial/Environmental Concerns:           Disposition Plan     Medically Ready for Discharge: Anticipated in 2-4 Days           Kishore Brady MD  Hospitalist Service  [facility]  Securely message with [employer] (more info)  Text page via [employer] Paging/Directory     ______________________________________________________________________    Chief Complaint   Confusion, abdominal distension    History is obtained from the patient, electronic health record, and emergency department physician    History of Present Illness   Isabel Alvarez is a 68 year old male who presents with ascites and confusion.  Noted at the time of admission patient " has pleasant but confused.  He is alert and oriented but he is not able to recall events.  Patient has a history of alcohol use disorder.  He was hospitalized in August with hyponatremia and ascites.  Prior to this he did not have known liver disease.  He was diagnosed with alcoholic cirrhosis during that visit.  He was started on diuretics.  He returned to the hospital this evening with worsening abdominal distention as well as confusion.  His father dropped him off at the hospital.  At the time of presentation to [facility] he is alert and pleasant.  He knows the year but states it is August.  He is not able to recount history or really why he is here.  He does deny any shortness of breath.  Denies any abdominal pain or chest pain.      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 Taking?   EPINEPHrine (ANY BX GENERIC EQUIV) 0.3 MG/0.3ML injection 2-pack  Self No No   Sig: Inject 0.3 mLs (0.3 mg) into the muscle as needed for anaphylaxis   furosemide (LASIX) 20 MG tablet   No No   Sig: Take 1 tablet (20 mg) by mouth 2 times daily.   lisinopril (ZESTRIL) 20 MG tablet   Yes No   Sig: Take 20 mg by mouth daily.   magnesium oxide (MAG-OX) 400 MG tablet   No No   Sig: Take 1 tablet (400 mg) by mouth 2 times daily.   metoprolol succinate ER (TOPROL XL) 200 MG 24 hr tablet   Yes No   Sig: Take 200 mg by mouth daily.   midodrine (PROAMATINE) 5 MG tablet   Yes No   Sig: Take 5 mg by mouth  3 times daily.   omeprazole (PRILOSEC) 40 MG DR capsule  Self No No   Sig: TAKE 1 CAPSULE BY MOUTH ONCE DAILY TAKE  30-60  MINUTES  BEFORE  A  MEAL   Patient taking differently: Take 40 mg by mouth daily (before supper). TAKE 1 CAPSULE BY MOUTH ONCE DAILY TAKE  30-60  MINUTES  BEFORE  A  MEAL   spironolactone (ALDACTONE) 25 MG tablet   No No   Sig: Take 1 tablet (25 mg) by mouth daily.      Facility-Administered Medications: None        Review of Systems    The 10 point Review of Systems is negative other than noted in the HPI or here.     Social History   I have reviewed this patient's social history and updated it with pertinent information if needed.  Social History     Tobacco Use    Smoking status: Every Day     Current packs/day: 0.50     Average packs/day: 0.5 packs/day for 50.0 years (25.0 ttl pk-yrs)     Types: Cigarettes    Smokeless tobacco: Never   Vaping Use    Vaping status: Never Used   Substance Use Topics    Alcohol use: Yes     Comment: alcohol use disorder    Drug use: No        Physical Exam   Vital Signs: Temp: 97.6  F (36.4  C) Temp src: Oral BP: (!) 158/86 Pulse: (!) 127   Resp: 16 SpO2: 93 %      Weight: 0 lbs 0 oz    General Appearance: Alert, very pleasant, confused but A&O  Respiratory: coarse with expiration, no wheezing  Cardiovascular: tachy, regular, 1+ edema to ankles  GI: distended, tense, nontender  Skin: no rashes or lesions grossly    Other: CN grossly intact, BURNETT      Medical Decision Making       75 MINUTES SPENT BY ME on the date of service doing chart review, history, exam, documentation & further activities per the note.      Data   ------------------------- PAST 24 HR DATA REVIEWED -----------------------------------------------    I have personally reviewed the following data over the past 24 hrs:    6.0  \   9.7 (L)   / 103 (L)     129 (L) 92 (L) 38.0 (H) /  150 (H)   4.1 22 2.07 (H) \     ALT: 24 AST: 57 (H) AP: 144 TBILI: 3.4 (H)   ALB: 2.8 (L) TOT PROTEIN: 7.0 LIPASE:  23     Trop: N/A BNP: 1,336 (H)     Procal: N/A CRP: 14.89 (H) Lactic Acid: N/A       INR:  1.50 (H) PTT:  35   D-dimer:  N/A Fibrinogen:  N/A       Imaging results reviewed over the past 24 hrs:   Recent Results (from the past 24 hour(s))   XR Chest Port 1 View    Narrative    EXAM: XR CHEST PORT 1 VIEW  LOCATION: [facility]  DATE: 9/20/2024    INDICATION: liver failure, adventitious lung sounds  COMPARISON: None.      Impression    IMPRESSION: Elevation right hemidiaphragm with atelectasis of the right midlung. Elevation is new. Otherwise negative.

## 2024-09-21 NOTE — PROGRESS NOTES
Transfer Type: [facility]  Transfer Triage Note    Date of call: 09/20/24  Time of call: 7:54 PM    Current Patient Location:  [facility]  Current Level of Care: Med Surg with tele    Vitals:HR 120s, /72, 93% on RA  Diagnosis: decompensated alcoholic cirrhosis, with distended abdomen needing a paracentesis for comfort  Reason for requested transfer: Further diagnostic work up, management, and consultation for specialized care   Isolation Needs: None    Care everywhere has been updated and reviewed: Yes  Necessary images have been sent through PACS: Yes    If patient is transferring for specialty care or specific procedure, the specialist required has participated in the transfer call and agreed with need for transfer and anticipated timeline: No    Transfer accepted: Yes  Stability of Patient: Patient is vitally stable, with no critical labs, and will likely remain stable throughout the transfer process  Is the patient appropriate for [facility]? Yes  Level of Care Needed: Med Surg  Telemetry Needed:  Med (Remote) Telemetry  Expected Time of Arrival for Transfer: 0-8 hours  Arrival Location:  [facility]     Recommendations for Management and Stabilization: Given    Additional Comments: 69 yo male with recently diagnosed alcohol cirrhosis (drinking until end of Aug), who presented with abd distension. No tenderness, no leukocytosis. Has hepatic encephalopathy with elevated ammonia to 138. Was started on lactulose in ED. Would benefit from [facility] protocol. Appears cachectic and intravascularly depleted, with creatinine now up to 2.07 from 1.2 baseline. He was given 100 mL/hr IV fluids by ED (tacycardic to 120s). He hasn't had a paracentesis in the past per chart review by ED doc. He is confused, needing things repeated for him frequently.     I recommended starting ceftriaxone and having them get a sample to rule out SBP when they take more volume out on 9/21. This will  affect culture but not cell count. I recommended limiting the amount of IV fluids as he has low albumin and is likely to displace those fluids into his abdomen quickly. Could consider albumin infusion for further rehydration.     Christiana Hou MD

## 2024-09-21 NOTE — PROGRESS NOTES
"   09/21/24 1036   Appointment Info   Signing Clinician's Name / Credentials (PT) Cory Zuniga DPT   Quick Adds   Quick Adds Edema Eval   Living Environment   People in Home parent(s)   Current Living Arrangements house   Home Accessibility no concerns   Living Environment Comments Pt lives in house with his dad, per pt report. No stairs to enter or within the home.   Self-Care   Usual Activity Tolerance moderate   Current Activity Tolerance moderate   Equipment Currently Used at Home none   Fall history within last six months no   Activity/Exercise/Self-Care Comment IND with functional mob, no AD use. Reports that he owns a SEC and a w/c (w/c was from brother who passed).   General Information   Onset of Illness/Injury or Date of Surgery 09/20/24   Referring Physician Kishore Brady MD   Pertinent History of Current Problem (include personal factors and/or comorbidities that impact the POC) Pt is 67 yo male who, per chart, \"PMHx of alcoholic liver cirrhosis with ascites, hepatic encephalopathy, hypertension admitted on 9/20/2024. He presents with abdominal distension and confusion. He remains confused today telling me he had a paracentesis today- which has not celestina done. He reports he has not had alcohol in 8 months per his report. He seems unclear about how severe his liver is, does not have a gastroenterologist. However, with chart review appears has had alcohol as recent as August. \"   Existing Precautions/Restrictions fall   Cognition   Affect/Mental Status (Cognition) confused   Orientation Status (Cognition) oriented x 4   Follows Commands (Cognition) WFL   Cognitive Status Comments mild confusion noted but difficult to assess given pt is also very [place]. Able to answer orientation questions on PT eval.   Pain Assessment   Patient Currently in Pain No   Integumentary/Edema   Integumentary/Edema Comments noted ascites and abdominal distencion.   Onset of Edema 09/20/24   Affected Body Part(s) Left " LE;Right LE   Edema Etiology Other (see comments)  (hx of liver cirrhosis, likely some chronic insufficiency)   Edema Examination/Assessment   Skin Condition Pitting;Dryness;Hemosiderin deposits   Skin Condition Comments redness noted on lateral B LEs, pt unable to explain how long it has been that way. Hemosideran deposits on L distal lower leg noted.   Pitting Assessment 2+ pitting edema in B LEs from toes to knees, R foot with later circumference than L foot.   Posture    Posture Forward head position   Range of Motion (ROM)   Range of Motion ROM is WFL   Strength (Manual Muscle Testing)   Strength (Manual Muscle Testing) Able to perform R SLR;Able to perform L SLR;Deficits observed during functional mobility   Bed Mobility   Comment, (Bed Mobility) did not assess on this date.   Transfers   Comment, (Transfers) STS no AD CGA from chair, pushing up from chair.   Gait/Stairs (Locomotion)   Comment, (Gait/Stairs) 20' w/o AD, slow to fair gait speed, WBOS, lateral sway and mild unsteadiness throughout when not using AD.   Balance   Balance Comments mild unsteadiness w/o AD, 1 LOB during session.   Sensory Examination   Sensory Perception patient reports no sensory changes   Clinical Impression   Criteria for Skilled Therapeutic Intervention Yes, treatment indicated   PT Diagnosis (PT) impaired functional mobility   Edema: Patient Presentation Stage 2 Lymphedema   Influenced by the following impairments edema, decreased strength, impaired balance.   Functional limitations due to impairments difficulty with ambulation, increased edema.   Clinical Presentation (PT Evaluation Complexity) stable   Clinical Presentation Rationale clinical judgment   Clinical Decision Making (Complexity) low complexity   Planned Therapy Interventions (PT) balance training;bed mobility training;gait training;home exercise program;neuromuscular re-education;ROM (range of motion);strengthening;stretching;transfer training;progressive  activity/exercise;manual therapy techniques   Risk & Benefits of therapy have been explained evaluation/treatment results reviewed;care plan/treatment goals reviewed;risks/benefits reviewed;current/potential barriers reviewed;participants voiced agreement with care plan;participants included;patient   PT Total Evaluation Time   PT Eval, Low Complexity Minutes (25664) 10   Physical Therapy Goals   PT Frequency Daily   PT Predicted Duration/Target Date for Goal Attainment 09/28/24   PT Goals Bed Mobility;Transfers;Gait;Edema   PT: Bed Mobility Independent;Supine to/from sit   PT: Transfers Modified independent;Sit to/from stand;Assistive device   PT: Gait Modified independent;Standard walker;Greater than 200 feet   PT: Edema education to increase ability to manage edema after discharge from the hospital Patient;Verbalize;[place];Skin care routine;signs/symptoms of intolerance;wear schedule;limb positioning;garment/bandage care;discharge recommendations   PT: Management of edema bandages Patient;Verbalize;Supervision/Stand-by assist;garment(s)   PT: Functional edema exercise program to reduce limb volume, increase activity tolerance and improve independence with ADL Patient;Demonstrates;HEP;[place]   Interventions   Interventions Quick Adds Gait Training;Therapeutic Activity;Manual Therapy   Therapeutic Activity   Therapeutic Activities: dynamic activities to improve functional performance Minutes (26379) 12   Symptoms Noted During/After Treatment Fatigue   Treatment Detail/Skilled Intervention Pt sittingin chair upon PT arrival. Pt agreeable to session, but [place] and needing repeated verbal cues throughout session. Pt performed STS x3 w/o AD, CGA, progressed to SBA. Overall steady with static standing. Pt ambulated ~300' without AD, CGA, mild unsteadiness with lateral swaying. Discused possible need for AD, recommending to use FWW. Brought to room at end of session. Pt sitting, alarm on, all needs within  reach.   Manual Therapy   Manual Therapy Minutes (93579) 16   Symptoms Noted During/After Treatment None   Treatment Detail/Skilled Intervention Pt was received supine in bed, agreeable to therapist assessment of edema. Pt presents with 2+ edema in B LE with dryness noted on bilateral LE's. Pt is appropriate for B LE compression garment, utilized edemawear on this date. Therapist completed washing of bilateral LE's and application of lotion, pt was educated on importance of skin care and daily checks to ensure skin integrity. Donned medium Edem[place]ear, trimmed excess and folded over some additional excess at bottom near toes/foot. Pt educated on only folding over distally for increased compression in foot where edema circumference is larger/more significant. Pt was educated on signs and symptoms of edema intolerance and verbalized understanding. Therapist also educated pt on OP edema clinic. Education on elevation of LEs for improved circulation and fluid return. Increased time also spent coordinating and educating patient's RN on rehab plan for B LE edema wrapping.   PT Discharge Planning   PT Plan check tolerance of edemawear and progress; provide handout for wear schedule and continuing donning as appropriate; balance and gait progress w/ FWW vs. no AD.   PT Discharge Recommendation (DC Rec) home with assist;home with outpatient physical therapy   PT Rationale for DC Rec Pt at baseline lives with his dad in house, no stairs. Is IND with mobility. Currently, pt is near baseline, but noted balance deficits w/o AD use and edema in B LEs. Anticipate with IP PT, pt will be safe to return home and pending edema therapy follow up. (will continue to reassess for walker and OP lymph needs)   PT Brief overview of current status Goals of therapy will be to address safe mobility and make recs for d/c to next level of care. Pt and RN will continue to follow all falls risk precautions as documented by RN staff while  hospitalized.   Total Session Time   Timed Code Treatment Minutes 28   Total Session Time (sum of timed and untimed services) 38

## 2024-09-22 NOTE — PLAN OF CARE
Summary:Alcoholic cirrhosis with ascites; Hepatic encephalopathy; Alcohol use disorder; Anasarca; GERD (direct admit from [facility])    DATE & TIME: 9/22/24 Day shift  Cognitive Concerns/ Orientation: Alert/Oriented x 4, forgetful   BEHAVIOR & AGGRESSION TOOL COLOR: Green  ABNL VS/O2: VSS, RA  MOBILITY: Assist-1 gait belt walker; unsteady on feet  PAIN MANAGMENT: Denies  DIET: 2gm Na diet + 1200 mL fluid restriction  BOWEL/BLADDER: Had 1 large BM- on lactulose; Lizarraga in place  ABNL LAB/BG: Creat 1.58; Mg 2.4; Ammonia 78  DRAIN/DEVICES: L PIV Infusing normal saline at 75mL/hour;   SKIN: Scattered bruising; abdomen distended/rounded/soft  TESTS/PROCEDURES: U/S Paracentesis 9/21 evening with 7.8L removed  D/C DAY/GOALS/PLACE: Pending, home with PT (lives with parents)  OTHER IMPORTANT INFO: GI/Neph/ PT/OT/SW following      Goal Outcome Evaluation:

## 2024-09-22 NOTE — PLAN OF CARE
Summary:Alcoholic cirrhosis with ascites; Hepatic encephalopathy; Alcohol use disorder; Anasarca; GERD (direct admit from [facility])    DATE & TIME: 9/21/24 9609-7021  Cognitive Concerns/ Orientation: Alert/Oriented x 4, forgetful and can have intermittent confusion  BEHAVIOR & AGGRESSION TOOL COLOR: Green  ABNL VS/O2: VSS, room air  MOBILITY: Assist-1 gait belt walker  PAIN MANAGMENT: Denies  DIET: 2gm Na diet + 1200 mL fluid restriction  BOWEL/BLADDER: Incontinent of bowel, on lactulose; Lizarraga in place  ABNL LAB/BG: BUN 29.9; Creat 1.58; Mg 1.4 (protocol running + oral Mg BID--recheck at 0930)  DRAIN/DEVICES: L PIV Infusing normal saline at 75mL/hour; IV Rocephin Q24hrs, IV MgSO4  SKIN: Scattered bruising; abdomen distended/rounded/soft  TESTS/PROCEDURES: U/S Paracentesis 9/21 evening with 7.8L removed  D/C DAY/GOALS/PLACE: Pending, home with PT (lives with father)  OTHER IMPORTANT INFO: GI/Neph/ PT/OT/SW following

## 2024-09-22 NOTE — PROGRESS NOTES
[facility]    Medicine Progress Note - Hospitalist Service    Date of Admission:  9/20/2024    Assessment & Plan     Isabel Alvarez is a 68 year old male admitted on 9/20/2024. He presents with abdominal distension, confusion     Alcoholic cirrhosis with ascites  Hepatic encephalopathy  Alcohol use disorder  Anasarca  GERD  [Lasix 20 mg BID, spironolactone 25 mg daily, omeprazole 40 mg daily, midodrine 5 mg TID]  *recent hospitalization 8/19-24 w/ hyponatremia, anasarca, BELA. During that admission w/ newly dx cirrhosis w/ anasarca. Drinking up until recently despite being monitored.   *returns with abdominal distension and confusion. Confused at transfer but alert and oriented, limited history. Denies pain c/o. In ED tachy HR 120s, other VSS. Ammonia 138. T bili 3.4 (up from previous, increasing). CRP 14.89. AST 57, ALT normal. BNP 1,336. CXR clear.   *MELD-Na 27 w/ ~27-32% 90 day mortality  - GI consult noted, recommend following daily labs, agree with holding diuretic, low-salt diet  -S/p paracentesis with IV albumin 9/21, and 7.8 L fluid removed.  Analysis shows transudative fluid.  Ceftriaxone discontinued.  - lactulose 20 mg increased to QID  - discontinue IV hydration, daily albumin x 2 and also with paracentesis given.  -Holding diuretic and Aldactone given BELA.  - continue PPI  - daily weights     BELA  Left mild to moderate hydronephrosis  Baseline creatinine ~0.9-1.0, recent 1.2-1.5. Creatinine on presentation 2.07. on ACE I PTA. UA 8/26 bland. Concern for HRS although with systemic hypertension. Will empirically treat for HRS pending nephrology consult  - holding diuretics, Aldactone, lisinopril  - Continue midodrine.  Continues on octreotide as well, will discuss with GI.  -IV albumin x 2 and also with paracentesis given.     -Nephrology consulted and evaluated, ultrasound was obtained which shows hydronephrosis as above, urology consulted.  Creatinine has slightly improved,  IVF discontinued, monitor.  -Lizarraga catheter was placed urinary retention and hydronephrosis.  Urology consulted as above.     Hypomagnesemia  Chronic hyponatremia  Baseline sodium mid-to-upper 120s. Sodium on presentation 129.   - replace Mg per protocol  -Sodium improved to 132     Anemia   Thrombocytopenia  Coagulopathy 2/2 above  Hgb 9.7/ plts 103 on presentation. Both roughly stable/ improved. INR 1.5.   -Hemoglobin slightly trended down to 8.2, platelet count as well trended down to 76, monitor     HTN  HLD  - holding PTA lisinopril and Aldactone.  Metoprolol resumed at low-dose, monitor  - prn hydralazine available     COPD  Tobacco use disorder  States currently cutting back  - declines NRT  - requests chantix but will hold on this for now        Diet: 2 Gram Sodium Diet  Fluid restriction 1200 ML FLUID    DVT Prophylaxis: Pneumatic Compression Devices  Lizarraga Catheter: PRESENT, indication: Acute retention or obstruction  Lines: None     Cardiac Monitoring: None  Code Status: Full Code      Clinically Significant Risk Factors                        # Financial/Environmental Concerns:           Disposition Plan     Medically Ready for Discharge: Anticipated in 2-4 Days anticipate 1-2 more days pending stable labs.             Campos Kurtz MD  Hospitalist Service  [facility]  Securely message with [employer] (more info)  Text page via [employer] Paging/Directory   ______________________________________________________________________    Interval History     Chart reviewed, discussed with nursing staff and patient was seen this morning.   Denies abdominal discomfort, feels better since paracentesis.  7.8L peritoneal fluid was drained, transudative.  Per nursing had 2 BMs yesterday.  No nausea.  Feels tired.  Denies dyspnea.    Physical Exam   Vital Signs: Temp: 98.5  F (36.9  C) Temp src: Oral BP: 118/71 Pulse: 99   Resp: 16 SpO2: 96 % O2 Device: None (Room air)    Weight: 163 lbs 9.3  oz    General: Alert awake, appears pallorous, appears comfortable.  HEENT: PERRLA EOMI. Mucosa moist.   Lungs: Bilateral equal air entry. Clear to auscultation, normal work of breathing.   CVS: S1S2 regular, no tachycardia or murmur.   Abdomen: Soft, distention significantly improved since paracentesis although still has ascites, no guarding or rigidity or rebound tenderness.  MSK: Bilateral 2+ leg edema  Neuro: Alert awake, oriented to self and place and month. CN 2-12 normal. Strength symmetrical.  Skin: No rash.       Medical Decision Making       40 MINUTES SPENT BY ME on the date of service doing chart review, history, exam, documentation & further activities per the note.      Data     I have personally reviewed the following data over the past 24 hrs:    5.0  \   8.2 (L)   / 76 (L)     132 (L) 97 (L) 29.9 (H) /  148 (H)   3.6 24 1.58 (H) \     ALT: 15 AST: 38 AP: 94 TBILI: 1.9 (H)   ALB: 3.3 (L) TOT PROTEIN: 5.9 (L) LIPASE: N/A     INR:  1.89 (H) PTT:  N/A   D-dimer:  N/A Fibrinogen:  N/A       Imaging results reviewed over the past 24 hrs:   Recent Results (from the past 24 hour(s))   US Renal Complete Non-Vascular    Narrative    EXAM: US RENAL COMPLETE NON-VASCULAR  LOCATION: [facility]  DATE: 9/21/2024    INDICATION: progressive BELA, no prior renal imaging  COMPARISON: None.  TECHNIQUE: Routine Bilateral Renal and Bladder Ultrasound.    FINDINGS:    RIGHT KIDNEY: 12.7 x 4.3 x 4.3 cm. Normal without hydronephrosis or masses.     LEFT KIDNEY: 11.1 x 4.4 x 4.9 cm. Mild to moderate left hydronephrosis.      Impression    BLADDER: Bladder decompressed. IMPRESSION:  1.  Mild to moderate left hydronephrosis. Right kidney within normal limits.

## 2024-09-22 NOTE — PROGRESS NOTES
[facility]  Gastroenterology Progress Note     Isabel Alvarez MRN# [id number]   YOB: 1955 Age: 68 year old          Assessment and Plan:     Isabel Alvarez is a 68 year old male with PMHx of alcoholic liver cirrhosis with ascites, hepatic encephalopathy, hypertension admitted on 9/20/2024. He presents with abdominal distension, confusion.     Alcoholic cirrhosis of liver with ascites (H)  Hepatic encephalopathy (H)  MELD 23 ~30% mortality rate in 90 days  Ammonia 138- pending repeat (tends to run around 80-90)  Tbili 3.4-1.9 Cr 2.0> 1.587 , baseline 1.2- concern for hepatorenal syndrome- hold diuretics  Albumin 2.8 and started on replacement. Now at 3.3. can hold albumin after today  Hemoglobin 9.7, Platelets 103- at baseline  INR 1.50- baseline  Paracentesis ordered- started on ceftriaxone  Decompensation may be due to ongoing alcohol use vs concern for SBP. Pending paracentesis for diagnostic/therapeutic reasons     - daily labs CMP, CRP, INR, CBC, Ammonia  - Hgb down today - likely dilutional- continue to monitor  - paracentesis 9/21- no results found- reports 700 mL removed  - nconcern for hepatorenal syndrome- hold diuretics- continue albumin, octreotide and midodrine  - continue lactulose 20 g TID and titrate for 2-3 stools a day  - continue ceftriaxone  - low sodium diet  - not a candidate for liver transplant with alcohol use as recent as August per chart review            Data Unavailable      Interval History:     no new complaints, doing well, denies chest pain, denies shortness of breath, denies abdominal pain, and doing well; no cp, sob, n/v/d, or abd pain.              Review of Systems:     C: NEGATIVE for fever, chills, change in weight  E/M: NEGATIVE for ear, mouth and throat problems  R: NEGATIVE for significant cough or SOB  CV: NEGATIVE for chest pain, palpitations or peripheral edema             Medications:   I have reviewed this patient's current  medications  Current Facility-Administered Medications   Medication Dose Route Frequency Provider Last Rate Last Admin    [Held by provider] furosemide (LASIX) tablet 20 mg  20 mg Oral BID Kishore Brady MD        lactulose (CHRONULAC) solution 20 g  20 g Oral Q6H Campos Kurtz MD   20 g at 09/22/24 0906    magnesium oxide (MAG-OX) tablet 400 mg  400 mg Oral BID Kishore Brady MD   400 mg at 09/22/24 0906    [Held by provider] metoprolol succinate ER (TOPROL XL) 24 hr tablet 200 mg  200 mg Oral Daily Kishore Brady MD        midodrine (PROAMATINE) tablet 5 mg  5 mg Oral TID Campos Kurtz MD   5 mg at 09/21/24 1708    octreotide (sandoSTATIN) injection 100 mcg  100 mcg Subcutaneous TID Kishore Brady MD   100 mcg at 09/22/24 0910    omeprazole (PriLOSEC) CR capsule 40 mg  40 mg Oral Daily before supper Kishore Brady MD   40 mg at 09/21/24 1708    sodium chloride (PF) 0.9% PF flush 3 mL  3 mL Intracatheter Q8H Kishore Brady MD   3 mL at 09/21/24 0057    [Held by provider] spironolactone (ALDACTONE) tablet 25 mg  25 mg Oral Daily Kishore Brady MD                      Physical Exam:   Vitals were reviewed  Vital Signs with Ranges  Temp:  [97.8  F (36.6  C)-98.5  F (36.9  C)] 98.5  F (36.9  C)  Pulse:  [] 99  Resp:  [16-18] 16  BP: (108-134)/(71-81) 118/71  SpO2:  [96 %-98 %] 96 %  I/O last 3 completed shifts:  In: 4712 [P.O.:1100; I.V.:3612]  Out: 1400 [Urine:1400]  Constitutional: healthy, alert, and no distress   Cardiovascular: negative, PMI normal. No lifts, heaves, or thrills. RRR. No murmurs, clicks gallops or rub  Respiratory: negative, Percussion normal. Good diaphragmatic excursion. Lungs clear  Abdomen: Abdomen soft, non-tender. BS normal. No masses, organomegaly             Data:   I reviewed the patient's new clinical lab test results.   Recent Labs   Lab Test 09/21/24  2232 09/20/24  1703 09/18/24  1026   WBC 5.0 6.0 5.1    HGB 8.2* 9.7* 9.7*   MCV 94 92 92   PLT 76* 103* 91*   INR 1.89* 1.50* 1.47*     Recent Labs   Lab Test 09/21/24 2232 09/20/24 1703 09/18/24  1026   POTASSIUM 3.6 4.1 3.7   CHLORIDE 97* 92* 90*   CO2 24 22 22   BUN 29.9* 38.0* 36.4*   ANIONGAP 11 15 13     Recent Labs   Lab Test 09/21/24 2232 09/21/24  0212 09/20/24 1703 09/18/24  1026 08/26/24  1302 08/26/24  1059 08/20/24  0535 08/19/24  1447   ALBUMIN 3.3*  --  2.8* 2.7*  --  2.8*   < >  --    BILITOTAL 1.9*  --  3.4* 2.4*  --  2.2*   < >  --    ALT 15  --  24 24  --  14   < >  --    AST 38  --  57* 57*  --  121*   < >  --    PROTEIN  --  Negative  --   --  Negative  --   --  Negative   LIPASE  --   --  23  --   --  23  --   --     < > = values in this interval not displayed.       I reviewed the patient's new imaging results.    All laboratory data reviewed  All imaging studies reviewed by me.    Tanna Murillo PA-C,  9/22/2024  [facility]  Office : [phone]  Cell: [phone] (Dr. Abad)  Cell: [phone] (Tanna Murillo PA-C)

## 2024-09-22 NOTE — PROGRESS NOTES
Renal Medicine Progress Note            Assessment/Plan:     Isabel Alvarez is a 68 year old male who was admitted on 9/20/2024.      Assessment:  1) progressive acute kidney injury:  Cannot rule out some component of abdominal compartment syndrome with a extreme ascites.  Otherwise he potentially may have progressive BELA from hepatorenal syndrome.  Will check a spot urine sodium otherwise agree with treatment started on admission with octreotide, albumin and midodrine.  Discussed with patient potential gravity of situation with poor prognosis if we cannot reverse this.     Labs done late yesterday with improved creatinine of 1.58.  Noted spot urine sodium at 20.    Renal ultrasound on 9/21/2024 with kidney sizes right 12.7 cm, left 11.1 symptoms.  Otherwise right kidney normal but left kidney did show mild to moderate hydronephrosis.  Unclear etiology of this, also do not think likely the major contributor to decreased GFR but should be evaluated further.  Consider urology consult or CT imaging.    2) hyponatremia: Hypervolemic in nature, admission at 129.  Secondary to hypervolemia.  Improved 132 with fluid restriction.     3) cirrhosis, second alcohol with associated portal hypertension.  Noted GI consult.  Noted recent alcohol use.  Important to note he is not a transplant candidate and if he progresses regarding renal failure, would likely not be a dialysis candidate.  Extremely distended abdomen on admission, status post paracentesis.  On ceftriaxone for potential SBP.     Other:  Anemia, hemoglobin same as last admission with a value of 9.7   Coagulopathy  Hepatic encephalopathy     Plan/Recs:  1) discontinued normal saline infusion today  2) continue current interventions including midodrine 5 mg 3 times daily, octreotide 100 mcg 3 times daily, IV albumin.  Continue holding diuretics for now.  3) continue fluid restriction 1.2 L/day  4)  needs daily BMPs.  Avoidance of contrast, nephrotoxins, NSAIDs as  jacobo Murillo DO  [facility]  Office: [phone]  Cell: [phone]        Interval History:     Patient with large-volume paracentesis yesterday.  He is feels improved regarding his abdominal distention.  Asking to go home reports everything we doing here in the hospital he can do at home.  Told him he is getting infusions, injections that can be done at home.  He is requesting his Lizarraga catheter be removed.       Medications and Allergies:     Current Facility-Administered Medications   Medication Dose Route Frequency Provider Last Rate Last Admin    [Held by provider] furosemide (LASIX) tablet 20 mg  20 mg Oral BID Kishore Brady MD        lactulose (CHRONULAC) solution 20 g  20 g Oral Q6H Campos Kurtz MD   20 g at 09/22/24 0906    magnesium oxide (MAG-OX) tablet 400 mg  400 mg Oral BID Kishore Brady MD   400 mg at 09/22/24 0906    [Held by provider] metoprolol succinate ER (TOPROL XL) 24 hr tablet 200 mg  200 mg Oral Daily Kishore Brady MD        midodrine (PROAMATINE) tablet 5 mg  5 mg Oral TID Campos Kurtz MD   5 mg at 09/21/24 1708    octreotide (sandoSTATIN) injection 100 mcg  100 mcg Subcutaneous TID Kishore Brady MD   100 mcg at 09/22/24 0910    omeprazole (PriLOSEC) CR capsule 40 mg  40 mg Oral Daily before supper Kishore Brady MD   40 mg at 09/21/24 1708    sodium chloride (PF) 0.9% PF flush 3 mL  3 mL Intracatheter Q8H Kishore Brady MD   3 mL at 09/21/24 0057    [Held by provider] spironolactone (ALDACTONE) tablet 25 mg  25 mg Oral Daily Kishore Brady MD            Allergies   Allergen Reactions    Bee Venom Anaphylaxis    Hctz [Hydrochlorothiazide] Rash    Penicillins Hives    Roxicet [Oxycodone-Acetaminophen] Hives            Physical Exam:   Vitals were reviewed  /71 (BP Location: Left arm)   Pulse 99   Temp 98.5  F (36.9  C) (Oral)   Resp 16   Wt 74.2 kg (163 lb 9.3 oz)   SpO2 96%    BMI 24.87 kg/m      Wt Readings from Last 3 Encounters:   09/21/24 74.2 kg (163 lb 9.3 oz)   09/20/24 75.8 kg (167 lb)   09/18/24 75.9 kg (167 lb 6.4 oz)       Intake/Output Summary (Last 24 hours) at 9/22/2024 1125  Last data filed at 9/22/2024 0647  Gross per 24 hour   Intake 4712 ml   Output 1400 ml   Net 3312 ml       GENERAL: No distress, sitting in bedside chair  HEENT:  Normocephalic.   CV: Tachycardic, regular with no murmurs noted.  2+ edema bilateral legs  RESP: Clear bilaterally with good efforts  GI: Abdomen distended, decrease in size and nontender  MUSCULOSKELETAL: extremities nl - no gross deformities noted  SKIN: no suspicious lesions or rashes, dry to touch  NEURO: Grossly nonfocal  PSYCH: mood good, affect appropriate           Data:     BMP  Recent Labs   Lab 09/21/24 2232 09/20/24  1703 09/18/24  1026   * 129* 125*   POTASSIUM 3.6 4.1 3.7   CHLORIDE 97* 92* 90*   YUNIOR 8.6* 8.9 8.9   CO2 24 22 22   BUN 29.9* 38.0* 36.4*   CR 1.58* 2.07* 2.08*   * 150* 139*     CBC  Recent Labs   Lab 09/21/24 2232 09/20/24  1703 09/18/24  1026   WBC 5.0 6.0 5.1   HGB 8.2* 9.7* 9.7*   HCT 22.5* 26.9* 26.9*   MCV 94 92 92   PLT 76* 103* 91*     Lab Results   Component Value Date    AST 38 09/21/2024    ALT 15 09/21/2024    ALKPHOS 94 09/21/2024    BILITOTAL 1.9 (H) 09/21/2024     (HH) 09/20/2024     Lab Results   Component Value Date    INR 1.89 (H) 09/21/2024       Attestation:  I have reviewed today's vital signs, notes, medications, labs and imaging.    DO [facility]  Office: [phone]  Cell: [phone]

## 2024-09-22 NOTE — PLAN OF CARE
Summary:Alcoholic cirrhosis with ascites; Hepatic encephalopathy; Alcohol use disorder; Anasarca; GERD (direct admit from [facility])  DATE & TIME: 9/21/24 1061-6241  Cognitive Concerns/ Orientation: A&Ox4 -sometimes intermittent confusion, forgetful  BEHAVIOR & AGGRESSION TOOL COLOR: Green  CIWA SCORE: N/A  ABNL VS/O2: WDL on RA except tachycardia (HR 100s)  MOBILITY: Ast 1 w/GB  PAIN MANAGMENT: Denies  DIET: 2gm Na diet - poor appetite  BOWEL/BLADDER: incontinent of BM, Large loose stool x2, mohan in place- good output  ABNL LAB/BG: labs in process  DRAIN/DEVICES: PIV Infusing NS @ 75mL  TELEMETRY RHYTHM: N/A  SKIN: Scattered bruising; BLE Scattered spider angiomas; at risk for skin tears; abdomen distended and round but soft  TESTS/PROCEDURES: US Paracentesis today this evening (removed 7.8L)  D/C DAY/GOALS/PLACE: Paracentesis done   OTHER IMPORTANT INFO: GI, Neph ; PT/OT/SW following. Octreotide (sandoSTATIN) SubQ, given IV albumin 37,5g post paracentesis

## 2024-09-23 NOTE — DISCHARGE SUMMARY
[facility]  Hospitalist Discharge Summary      Date of Admission:  9/20/2024  Date of Discharge:  9/23/2024  Discharging Provider: Campos Kurtz MD  Discharge Service: Hospitalist Service    Discharge Diagnoses     Please refer to the hospital course     Clinically Significant Risk Factors          Follow-ups Needed After Discharge   Follow-up Appointments     Follow-up and recommended labs and tests       Follow up with primary care provider, Josias Ch, within 7 days to   evaluate medication change, for hospital follow- up, and regarding new   diagnosis and medications refill.  The following labs/tests are   recommended: BMP and CBC in a week.             Unresulted Labs Ordered in the Past 30 Days of this Admission       Date and Time Order Name Status Description    9/23/2024 12:04 PM Hepatic panel In process     9/21/2024 12:25 AM Ascites Fluid Aerobic Bacterial Culture Routine With Gram Stain Preliminary     9/20/2024  4:53 PM Blood Culture Peripheral Blood Preliminary         These results will be followed up by hospitalist    Discharge Disposition   Discharged to home  Condition at discharge: Stable    Hospital Course     Isabel Alvarez is a 68 year old male admitted on 9/20/2024. He presents with abdominal distension, confusion     Alcoholic cirrhosis with ascites  Hepatic encephalopathy  Alcohol use disorder  Anasarca  GERD  [Lasix 20 mg BID, spironolactone 25 mg daily, omeprazole 40 mg daily, midodrine 5 mg TID]  *recent hospitalization 8/19-24 w/ hyponatremia, anasarca, BELA. During that admission w/ newly dx cirrhosis w/ anasarca. Drinking up until recently despite being monitored.   *returns with abdominal distension and confusion. Confused at transfer but alert and oriented, limited history. Denies pain c/o. In ED tachy HR 120s, other VSS. Ammonia 138. T bili 3.4 (up from previous, increasing). CRP 14.89. AST 57, ALT normal. BNP 1,336. CXR clear.   *MELD-Na 27 w/  ~27-32% 90 day mortality  - GI consult noted, recommend following daily labs, agreed with holding diuretic, low-salt diet.   - S/p paracentesis with IV albumin 9/21, and 7.8 L fluid removed.  Analysis shows transudative fluid.  Ceftriaxone discontinued.  - Still with recurrent ascites now.  No abdominal pain.  Patient does not have any outpatient paracentesis scheduled and he is from [place].  Also now off diuretic and Aldactone due to acute kidney injury show risk of worsening ascites.  Given this, repeat paracentesis done today prior to discharge and another 4.5L  - lactulose 20 mg 3 times daily at discharge.  He is having 3 BMs daily.  -Now off IV hydration, daily albumin x 2 and also with paracentesis given.  Creatinine continues to improve and 1.09 today.  - Holding diuretic and Aldactone given BELA.  Recommended follow-up with PCP next week and gradually resume.  - continue PPI     BELA  Left mild to moderate hydronephrosis  Baseline creatinine ~0.9-1.0, recent 1.2-1.5. Creatinine on presentation 2.07. on ACE I PTA. UA 8/26 bland. Concern for HRS although with systemic hypertension. Will empirically treat for HRS pending nephrology consult  - holding diuretics, Aldactone, lisinopril  - Continue midodrine.  Continues on octreotide as well, will discuss with GI.  -IV albumin x 2 and also with paracentesis given.     -Nephrology consulted and evaluated, ultrasound was obtained which shows hydronephrosis as above, urology consulted.  Creatinine improved to 1.09, baseline.  Remains off lisinopril, Aldactone, Lasix.  -Lizarraga catheter was placed urinary retention (900 ml) and hydronephrosis.  Urology consulted as above.  Plan is to continue Lizarraga catheter and voiding trial after 1 week at urology clinic in [place].  Possibly needs cystoscopy as outpatient.     Hypomagnesemia  Chronic hyponatremia  Baseline sodium mid-to-upper 120s. Sodium on presentation 129.   - replaced Mg per protocol  -Sodium improved to 136,  normal     Anemia   Thrombocytopenia  Coagulopathy 2/2 above  Hgb 9.7/ plts 103 on presentation. Both roughly stable/ improved. INR 1.5.   -Hemoglobin slightly trended down to 8.2--8.3, platelet count as well trended down to 70 and stable.     HTN  HLD  - holding PTA lisinopril and Aldactone.  Metoprolol resumed at low-dose   - prn hydralazine available     COPD  Tobacco use disorder  States currently cutting back  - declines NRT  - requests chantix but will hold on this for now    Consultations This Hospital Stay   GASTROENTEROLOGY IP CONSULT  NEPHROLOGY IP CONSULT  PHYSICAL THERAPY ADULT IP CONSULT  CARE MANAGEMENT / SOCIAL WORK IP CONSULT  UROLOGY IP CONSULT    Code Status   Full Code    Time Spent on this Encounter   I, Campos Kurtz MD, personally saw the patient today and spent greater than 30 minutes discharging this patient.       Campos Kurtz MD  [place] [address] [facility]  [address] [place] [address]  Phone: [phone]  ______________________________________________________________________    Physical Exam   Vital Signs: Temp: 98.3  F (36.8  C) Temp src: Oral BP: 113/82 Pulse: 112   Resp: 20 SpO2: 99 % O2 Device: None (Room air)    Weight: 151 lbs 3.77 oz    General: Alert awake, appears comfortable.  HEENT: PERRLA EOMI. Mucosa moist.   Lungs: Bilateral equal air entry. Clear to auscultation, normal work of breathing.   CVS: S1S2 regular, no tachycardia or murmur.   Abdomen: Soft, mildly distended with ascites.  Nontender.  MSK: Bilateral 2+ pitting pedal edema mostly around the feet.  Neuro: AAOX3. CN 2-12 normal. Strength symmetrical.  Skin: No rash.          Primary Care Physician   Josias Ch    Discharge Orders      Primary Care - Care Coordination Referral      Adult Urology  Referral      Reason for your hospital stay    Acute kidney injury  Alcoholic cirrhosis, ascites, hepatic encephalopathy.     Follow-up and recommended labs and tests      Follow up with primary care provider, Josias Ch, within 7 days to evaluate medication change, for hospital follow- up, and regarding new diagnosis and medications refill.  The following labs/tests are recommended: BMP and CBC in a week.     Activity    Your activity upon discharge: activity as tolerated     Diet    Follow this diet upon discharge: Current Diet:Orders Placed This Encounter      Fluid restriction 1500 ML FLUID      2 Gram Sodium Diet       Significant Results and Procedures   Most Recent 3 CBC's:  Recent Labs   Lab Test 09/23/24 0612 09/21/24 2232 09/20/24  1703   WBC 5.5 5.0 6.0   HGB 8.3* 8.2* 9.7*   MCV 94 94 92   PLT 70* 76* 103*     Most Recent 3 BMP's:  Recent Labs   Lab Test 09/23/24 0612 09/21/24 2232 09/20/24  1703    132* 129*   POTASSIUM 3.6 3.6 4.1   CHLORIDE 101 97* 92*   CO2 26 24 22   BUN 17.5 29.9* 38.0*   CR 1.09 1.58* 2.07*   ANIONGAP 9 11 15   YUNIOR 8.6* 8.6* 8.9   * 148* 150*     Most Recent 2 LFT's:  Recent Labs   Lab Test 09/21/24 2232 09/20/24  1703   AST 38 57*   ALT 15 24   ALKPHOS 94 144   BILITOTAL 1.9* 3.4*     Most Recent 3 INR's:  Recent Labs   Lab Test 09/21/24 2232 09/20/24 1703 09/18/24  1026   INR 1.89* 1.50* 1.47*     7-Day Micro Results       Collected Updated Procedure Result Status      09/21/2024 1536 09/21/2024 1730 Cell count with differential fluid [[id number]]    Ascites Fluid from Paracentesis    Final result Component Value   No component results            09/21/2024 1536 09/23/2024 0956 Ascites Fluid Aerobic Bacterial Culture Routine With Gram Stain [[id number]]    Ascites Fluid from Peritoneum    Preliminary result Component Value   Culture No growth after 1 day  [P]    Gram Stain Result No organisms seen  [P]     2+ WBC seen  [P]                09/21/2024 1536 09/21/2024 1635 Albumin fluid [[id number]]    Peritoneal Fluid from Peritoneum    Final result Component Value Units   Albumin Fluid Source Abdomen    Albumin  fluid 0.7 g/dL            09/21/2024 1536 09/21/2024 1730 Cell Count Body Fluid [[id number]]    Ascites Fluid from Paracentesis    Final result Component Value Units   Color Yellow    Clarity Clear    Cell Count Fluid Source Abdomen    Total Nucleated Cells 88 /uL            09/21/2024 1536 09/21/2024 1730 Differential Body Fluid [[id number]]    Ascites Fluid from Paracentesis    Final result Component Value Units   % Neutrophils 5 %   % Lymphocytes 9 %   % Monocyte/Macrophages 81 %   % Lining Cells 5 %   Absolute Neutrophils, Body Fluid 4.4 /uL            09/20/2024 1703 09/22/2024 2216 Blood Culture Peripheral Blood [[id number]]   Peripheral Blood    Preliminary result Component Value   Culture No growth after 2 days  [P]                      Most Recent TSH and T4:No lab results found.  Most Recent 6 glucoses:  Recent Labs   Lab Test 09/23/24  0612 09/21/24  2232 09/20/24  1703 09/18/24  1026 08/26/24  1059 08/26/24  1053   * 148* 150* 139* 102* 99   ,   Results for orders placed or performed during the hospital encounter of 09/20/24   US Paracentesis with Albumin    Narrative    US PARACENTESIS WITH ALBUMIN 9/21/2024 3:36 PM     HISTORY: ascites, concern for SBP    FINDINGS: Limited preprocedure ultrasound was performed, images show a  sufficient amount of ascites for paracentesis. An image was archived.  Written and oral informed consent was obtained. A pause for the cause  procedure to verify the correct patient and correct procedure. The  skin overlying the right lower quadrant was prepped and draped in the  usual sterile fashion. The subcutaneous tissues were anesthetized with  10mL 1% lidocaine. Under direct ultrasound guidance the catheter was  advanced into the peritoneal space and 7.8 L of  straw colored fluid  was drained. The catheter was removed and a sterile dressing was  applied. There were no immediate complications. Ultrasound images were  permanently stored.  Patient left the  ultrasound suite in satisfactory  condition.      Impression    IMPRESSION: Technically successful paracentesis without immediate  complications.    SAMANTHA ROB DO         SYSTEM ID:  [id number]   US Renal Complete Non-Vascular    Narrative    EXAM: US RENAL COMPLETE NON-VASCULAR  LOCATION: [facility]  DATE: 9/21/2024    INDICATION: progressive BELA, no prior renal imaging  COMPARISON: None.  TECHNIQUE: Routine Bilateral Renal and Bladder Ultrasound.    FINDINGS:    RIGHT KIDNEY: 12.7 x 4.3 x 4.3 cm. Normal without hydronephrosis or masses.     LEFT KIDNEY: 11.1 x 4.4 x 4.9 cm. Mild to moderate left hydronephrosis.      Impression    BLADDER: Bladder decompressed. IMPRESSION:  1.  Mild to moderate left hydronephrosis. Right kidney within normal limits.   US Paracentesis with Albumin    Narrative    US PARACENTESIS WITH ALBUMIN 9/23/2024 11:20 AM    CLINICAL HISTORY: Cirrhosis and ascites, abdominal distention    PROCEDURE: Informed consent obtained. Time out performed. The abdomen  was prepped and draped in a sterile fashion. 10 mL of 1% lidocaine was  infused into local soft tissues. An 8 Macanese catheter system was  introduced into the abdominal ascites under ultrasound guidance.    4.5 liters of clear fluid were removed and sent to lab if requested.    Patient tolerated procedure well.    Ultrasound imaging was obtained and placed in the patient's permanent  medical record.      Impression    IMPRESSION:  1.  Status post ultrasound-guided paracentesis.    JOHNNIE THORNTON MD         SYSTEM ID:  [id number]       Discharge Medications   Current Discharge Medication List        START taking these medications    Details   lactulose (CHRONULAC) 10 GM/15ML solution Take 30 mLs (20 g) by mouth 3 times daily.  Qty: 2700 mL, Refills: 0    Associated Diagnoses: Hepatic encephalopathy           CONTINUE these medications which have CHANGED    Details   metoprolol succinate ER (TOPROL XL) 25 MG 24  hr tablet Take 1 tablet (25 mg) by mouth daily.  Qty: 30 tablet, Refills: 0    Comments: Future refills by PCP Dr. Josias Ch with phone number [phone].  Associated Diagnoses: Benign essential hypertension           CONTINUE these medications which have NOT CHANGED    Details   EPINEPHrine (ANY BX GENERIC EQUIV) 0.3 MG/0.3ML injection 2-pack Inject 0.3 mLs (0.3 mg) into the muscle as needed for anaphylaxis  Qty: 2 each, Refills: 1    Associated Diagnoses: Allergy to bee sting      magnesium oxide (MAG-OX) 400 MG tablet Take 1 tablet (400 mg) by mouth 2 times daily.  Qty: 100 tablet, Refills: 3    Associated Diagnoses: Hypomagnesemia      midodrine (PROAMATINE) 5 MG tablet Take 5 mg by mouth 3 times daily.      omeprazole (PRILOSEC) 40 MG DR capsule TAKE 1 CAPSULE BY MOUTH ONCE DAILY TAKE  30-60  MINUTES  BEFORE  A  MEAL  Qty: 90 capsule, Refills: 3    Comments: Profile Rx: patient will contact pharmacy when needed  Associated Diagnoses: Gastroesophageal reflux disease without esophagitis      pravastatin (PRAVACHOL) 40 MG tablet Take 40 mg by mouth daily.           STOP taking these medications       furosemide (LASIX) 20 MG tablet Comments:   Reason for Stopping:         lisinopril (ZESTRIL) 20 MG tablet Comments:   Reason for Stopping:         spironolactone (ALDACTONE) 25 MG tablet Comments:   Reason for Stopping:             Allergies   Allergies   Allergen Reactions    Bee Venom Anaphylaxis    Hctz [Hydrochlorothiazide] Rash    Penicillins Hives    Roxicet [Oxycodone-Acetaminophen] Hives

## 2024-09-23 NOTE — PLAN OF CARE
Physical Therapy Discharge Summary    Reason for therapy discharge:    Discharged to home with outpatient therapy.    Progress towards therapy goal(s). See goals on Care Plan in [facility] electronic health record for goal details.  Goals not met.  Barriers to achieving goals:   discharge from facility.    Therapy recommendation(s):    Continued therapy is recommended.  Rationale/Recommendations:  Pt ambulates with SBA no AD, demonstrates impaired dynamic balance with dual tasking and head turns. Rec use of FWW at DC, pt declining this but states he does have walker at home he can use but likely wont. Recommend OP PT to address impaired balance. Pt demonstrating cognitive deficits during session, forgetful, unable to demonstrate recall, impaired STM noted, recommend OP OT follow up to address cognitive impairments. Recommend 24/7 assist with IADLs and med management.

## 2024-09-23 NOTE — CONSULTS
Urology    Name: Isabel Alvarez    MRN: [id number]   YOB: 1955         We were asked to see Isabel Alvarez in consultation from Dr. Kurtz  for evaluation and treatment of urinary retention, BELA and mild left hydro.    Consult, one time to make recommendations.           Chief Complaint:   Urinary retention  History is obtained from the patient and EMR.          History of Present Illness:   Isabel Alvarez is a 68 year old male with urinary retention, admitted for abdominal pain. Hx of alcoholic cirrhosis with ascites, HTN and tobacco use.     Lizarraga was placed for 900cc 9/21/24.  He had a paracentesis for almost 8L.      Renal US done for BELA (Cr 2.8) noted mild left hydronephrosis. He denies any baseline voiding issues.     Cr has normalized. Tolerating Lizarraga well. Tells me this is coming out today.           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         Allergies:     Allergies   Allergen Reactions    Bee Venom Anaphylaxis    Hctz [Hydrochlorothiazide] Rash    Penicillins Hives    Roxicet [Oxycodone-Acetaminophen] Hives            Medications:     Current Facility-Administered Medications    Medication Dose Route Frequency Provider Last Rate Last Admin    calcium carbonate (TUMS) chewable tablet 1,000 mg  1,000 mg Oral 4x Daily PRN Kishore Brady MD        [Held by provider] furosemide (LASIX) tablet 20 mg  20 mg Oral BID Kishore Brady MD        hydrALAZINE (APRESOLINE) tablet 10 mg  10 mg Oral Q4H PRN Kishore Brady MD        Or    hydrALAZINE (APRESOLINE) injection 10 mg  10 mg Intravenous Q4H PRN Kishore Brady MD        lactulose (CHRONULAC) solution 20 g  20 g Oral Q6H Campos Kurtz MD   20 g at 09/23/24 0404    lidocaine (LMX4) cream   Topical Q1H PRN Kishore Brady MD        lidocaine 1 % 0.1-1 mL  0.1-1 mL Other Q1H PRN Kishoer Brady MD        magnesium oxide (MAG-OX) tablet 400 mg  400 mg Oral BID Kishore Brady MD   400 mg at 09/22/24 0906    [Held by provider] metoprolol succinate ER (TOPROL XL) 24 hr tablet 25 mg  25 mg Oral Daily Campos Kurtz MD        midodrine (PROAMATINE) tablet 5 mg  5 mg Oral TID Campos Kurtz MD   5 mg at 09/21/24 1708    octreotide (sandoSTATIN) injection 100 mcg  100 mcg Subcutaneous TID Kishore Brady MD   100 mcg at 09/22/24 2119    omeprazole (PriLOSEC) CR capsule 40 mg  40 mg Oral Daily before supper Kishore Brady MD   40 mg at 09/22/24 1633    ondansetron (ZOFRAN ODT) ODT tab 4 mg  4 mg Oral Q6H PRN Kishore Brady MD        Or    ondansetron (ZOFRAN) injection 4 mg  4 mg Intravenous Q6H PRN Kishore Brady MD        senna-docusate (SENOKOT-S/PERICOLACE) 8.6-50 MG per tablet 1 tablet  1 tablet Oral BID PRN Kishore Brady MD        Or    senna-docusate (SENOKOT-S/PERICOLACE) 8.6-50 MG per tablet 2 tablet  2 tablet Oral BID PRN Kishore Brady MD        sodium chloride (PF) 0.9% PF flush 3 mL  3 mL Intracatheter Q8H Kishore Brady MD   3 mL at 09/22/24 1633    sodium chloride (PF) 0.9% PF flush 3 mL  3 mL  Intracatheter q1 min prn Kishore Brady MD        [Held by provider] spironolactone (ALDACTONE) tablet 25 mg  25 mg Oral Daily Kishore Brady MD                 Review of Systems:      ROS: 10 point ROS neg other than the symptoms noted above in the HPI.          Physical Exam:     VS:  T: 98.3    HR: 112    BP: 113/82    RR: 20   GEN:  AOx3.  NAD.  Pleasant.  GEN: NAD, lying in bed  EYES: EOMI  NEURO: AAO          Data:   All laboratory data reviewed:    Recent Labs   Lab 09/23/24  0612 09/21/24 2232 09/20/24  1703 09/18/24  1026   WBC 5.5 5.0 6.0 5.1   HGB 8.3* 8.2* 9.7* 9.7*   PLT 70* 76* 103* 91*       Recent Labs   Lab 09/23/24  0612 09/22/24  1050 09/22/24  0104 09/21/24 2232 09/20/24  1703 09/18/24  1026     --   --  132* 129* 125*   POTASSIUM 3.6  --   --  3.6 4.1 3.7   CHLORIDE 101  --   --  97* 92* 90*   CO2 26  --   --  24 22 22   BUN 17.5  --   --  29.9* 38.0* 36.4*   CR 1.09  --   --  1.58* 2.07* 2.08*   *  --   --  148* 150* 139*   YUNIOR 8.6*  --   --  8.6* 8.9 8.9   MAG  --  2.4* 1.4*  --  1.4* 1.4*   PHOS  --   --   --   --  3.3  --        Recent Labs   Lab 09/21/24  0212   COLOR Yellow   APPEARANCE Clear   URINEGLC Negative   URINEBILI Negative   URINEKETONE Negative   SG 1.016   URINEPH 5.0   PROTEIN Negative   NITRITE Negative   LEUKEST Negative   RBCU 0   WBCU 3       All pertinent imaging reviewed.           Impression and Plan:   Impression:   Isabel Alvarez is a 68 year old male with acute urinary retention (900cc), alcoholic cirrhosis and ascites.       Plan:   -Manage urinary retention with indwelling catheter. Given the volume of retention and bladder distention, would not recommend TOV today.   -Avoid anticholinergic, antihistamine, and alpha-agonist medications as these will exacerbate urinary retention   -f/u with urology for trial of void in 7-10 days. Our office will call to coordinate.       Discussed with Dr. Ruiz.     TATO Moody  [facility]rology  Office: [phone]  After business hours: [phone]    ADDENDUM:  Reviewed with Dr. Kurtz. Pt may be discharging to the [facility]. In that case, can follow-up locally with Urology for TOV.

## 2024-09-23 NOTE — PLAN OF CARE
Summary:Alcoholic cirrhosis with ascites; Hepatic encephalopathy; Alcohol use disorder; Anasarca; GERD (direct admit from [facility])    DATE & TIME: 9/22/24 7727-5499  Cognitive Concerns/ Orientation: Alert/Oriented x 4, forgetful   BEHAVIOR & AGGRESSION TOOL COLOR: Green  ABNL VS/O2: VSS, RA ex tachy (HR 100s)  MOBILITY: Assist-1 gait belt w/walker, up in chair for meals  PAIN MANAGMENT: Denies  DIET: 2gm Na diet + 1200 mL fluid restriction  BOWEL/BLADDER: Had BM x1 this evening- on lactulose; Lizarraga in place  ABNL LAB/BG: Creat 1.58; Mg 2.4; Ammonia 78  DRAIN/DEVICES: L PIV Infusing normal saline at 75mL/hour;   SKIN: Scattered bruising; abdomen distended/rounded/soft  TESTS/PROCEDURES: U/S Paracentesis 9/21 evening with 7.8L removed  D/C DAY/GOALS/PLACE: Pending, home with PT (lives with parents)  OTHER IMPORTANT INFO: GI/Neph/ PT/OT/SW following, Renal US found mild/moderate L kidney hydronephrosis - Urology consulted

## 2024-09-23 NOTE — PROGRESS NOTES
[facility]  Gastroenterology Progress Note     Isabel Alvarez MRN# [id number]   YOB: 1955 Age: 68 year old          Assessment and Plan:     Isabel Alvarez is a 68 year old male with PMHx of alcoholic liver cirrhosis with ascites, hepatic encephalopathy, hypertension admitted on 9/20/2024. He presents with abdominal distension, confusion.     Alcoholic cirrhosis of liver with ascites (H)  Hepatic encephalopathy (H)  Initial MELD 23 ~30% mortality rate in 90 days.  Now at 18  Ammonia 138-  (tends to run around 80-90) - Now 78  Tbili 3.4-1.9 Cr 2.0>1.9,   Cr 2.0-->1.5.  baseline 1.2- concern for hepatorenal syndrome- hold diuretics  Albumin 2.8 and started on replacement. Now at 3.3. can hold albumin after today  Hemoglobin 9.7, Platelets 103- at baseline  INR 1.50- baseline  Paracentesis ordered- started on ceftriaxone  Decompensation may be due to ongoing alcohol use vs concern for SBP. Pending paracentesis for diagnostic/therapeutic reasons     - daily labs CMP, CRP, INR, CBC, Ammonia  - Hgb down today - likely dilutional- continue to monitor  - paracentesis 9/21- no results found- reports 700 mL removed  - concern for hepatorenal syndrome- hold diuretics- continue albumin, octreotide and midodrine  - continue lactulose 20 g TID and titrate for 2-3 stools a day  - continue ceftriaxone  - low sodium diet  - not a candidate for liver transplant with alcohol use as recent as August per chart review  - 9/23 - Will add on LFTs, recommend repeat therapeutic paracentesis for ascites.  Repeat labs in AM.  Can likely restart spironolactone tomorrow if Cr continues to improve and low dose lasix 20 mg daily.     MELD 3.0: 17 at 9/23/2024  6:12 AM  MELD-Na: 18 at 9/23/2024  6:12 AM  Calculated from:  Serum Creatinine: 1.09 mg/dL at 9/23/2024  6:12 AM  Serum Sodium: 136 mmol/L at 9/23/2024  6:12 AM  Total Bilirubin: 1.9 mg/dL at 9/21/2024 10:32 PM  Serum Albumin: 3.3 g/dL at  9/21/2024 10:32 PM  INR(ratio): 1.89 at 9/21/2024 10:32 PM  Age at listing (hypothetical): 68 years  Sex: Male at 9/23/2024  6:12 AM          Interval History:     no new complaints, denies chest pain, denies shortness of breath, denies abdominal pain.  Ascites present.              Review of Systems:     C: NEGATIVE for fever, chills, change in weight  E/M: NEGATIVE for ear, mouth and throat problems  R: NEGATIVE for significant cough or SOB  CV: NEGATIVE for chest pain, palpitations or peripheral edema             Medications:   I have reviewed this patient's current medications  Current Facility-Administered Medications   Medication Dose Route Frequency Provider Last Rate Last Admin    [Held by provider] furosemide (LASIX) tablet 20 mg  20 mg Oral BID Kishore Brady MD        lactulose (CHRONULAC) solution 20 g  20 g Oral Q6H Campos Kurtz MD   20 g at 09/23/24 0911    magnesium oxide (MAG-OX) tablet 400 mg  400 mg Oral BID Kishore Brady MD   400 mg at 09/23/24 0911    [Held by provider] metoprolol succinate ER (TOPROL XL) 24 hr tablet 25 mg  25 mg Oral Daily Campos Kurtz MD        midodrine (PROAMATINE) tablet 5 mg  5 mg Oral TID Campos Kurtz MD   5 mg at 09/23/24 0911    octreotide (sandoSTATIN) injection 100 mcg  100 mcg Subcutaneous TID Kishore Brady MD   100 mcg at 09/23/24 0914    omeprazole (PriLOSEC) CR capsule 40 mg  40 mg Oral Daily before supper Kishore Brady MD   40 mg at 09/22/24 1633    sodium chloride (PF) 0.9% PF flush 3 mL  3 mL Intracatheter Q8H Kishore Brady MD   3 mL at 09/23/24 0911    [Held by provider] spironolactone (ALDACTONE) tablet 25 mg  25 mg Oral Daily Kishore Brady MD                      Physical Exam:   Vitals were reviewed  Vital Signs with Ranges  Temp:  [97.9  F (36.6  C)-98.5  F (36.9  C)] 98.3  F (36.8  C)  Pulse:  [103-112] 112  Resp:  [18-20] 20  BP: ()/(65-87) 113/82  SpO2:  [98 %-99 %] 99  %  I/O last 3 completed shifts:  In: 960 [P.O.:960]  Out: 1150 [Urine:1150]  Constitutional: healthy, alert, and no distress   Cardiovascular: negative, PMI normal. No lifts, heaves, or thrills. RRR. No murmurs, clicks gallops or rub  Respiratory: negative, Percussion normal. Good diaphragmatic excursion. Lungs clear  Abdomen: Abdomen soft, non-tender. BS normal. Ascites present             Data:   I reviewed the patient's new clinical lab test results.   Recent Labs   Lab Test 09/23/24  0612 09/21/24 2232 09/20/24  1703 09/18/24  1026   WBC 5.5 5.0 6.0 5.1   HGB 8.3* 8.2* 9.7* 9.7*   MCV 94 94 92 92   PLT 70* 76* 103* 91*   INR  --  1.89* 1.50* 1.47*     Recent Labs   Lab Test 09/23/24 0612 09/21/24 2232 09/20/24  1703   POTASSIUM 3.6 3.6 4.1   CHLORIDE 101 97* 92*   CO2 26 24 22   BUN 17.5 29.9* 38.0*   ANIONGAP 9 11 15     Recent Labs   Lab Test 09/21/24 2232 09/21/24  0212 09/20/24  1703 09/18/24  1026 08/26/24  1302 08/26/24  1059 08/20/24  0535 08/19/24  1447   ALBUMIN 3.3*  --  2.8* 2.7*  --  2.8*   < >  --    BILITOTAL 1.9*  --  3.4* 2.4*  --  2.2*   < >  --    ALT 15  --  24 24  --  14   < >  --    AST 38  --  57* 57*  --  121*   < >  --    PROTEIN  --  Negative  --   --  Negative  --   --  Negative   LIPASE  --   --  23  --   --  23  --   --     < > = values in this interval not displayed.       I reviewed the patient's new imaging results.    All laboratory data reviewed  All imaging studies reviewed by me.    MIKE Deal,  9/22/2024  [facility]  Office : [phone]  Cell: [phone] (Dr. Abad)  Cell: [phone] (Tanna Murillo PA-C)

## 2024-09-23 NOTE — PLAN OF CARE
Summary:Alcoholic cirrhosis with ascites; Hepatic encephalopathy; Alcohol use disorder; Anasarca; GERD (direct admit from [facility])     DATE & TIME: 9/21/24 3376-2303  Cognitive Concerns/ Orientation: Alert/Oriented x 4, forgetful and can have intermittent confusion  BEHAVIOR & AGGRESSION TOOL COLOR: Green  ABNL VS/O2: Heart rate 103, BP 95/65 otherwise VSS, room air  MOBILITY: Assist-1 gait belt walker  PAIN MANAGMENT: Denies  DIET: 2gm Na diet + 1200 mL fluid restriction  BOWEL/BLADDER: Incontinent of bowel, on lactulose; Lizarraga in place  ABNL LAB/BG: Mg 2.4   DRAIN/DEVICES: L PIV saline locked; IV Rocephin Q24hrs, IV MgSO4  SKIN: Scattered bruising; abdomen distended/rounded/soft  TESTS/PROCEDURES: U/S Paracentesis 9/21 evening with 7.8L removed  D/C DAY/GOALS/PLACE: Pending, home with PT (lives with father)  OTHER IMPORTANT INFO: GI/Neph/ PT/OT/SW following

## 2024-09-23 NOTE — DISCHARGE SUMMARY
Discharge    Patient discharged to Home via Car with Family  Care plan note Met    Listed belongings gathered and given to patient (including from security/pharmacy). Yes  Care Plan and Patient education resolved: Yes  Prescriptions if needed, hard copies sent with patient  NA  Medication Bin checked and emptied on discharge Yes  SW/care coordinator/charge RN aware of discharge: Yes

## 2024-09-23 NOTE — PROGRESS NOTES
Renal Medicine Progress Note            Assessment/Plan:     Isabel Alvarez is a 68 year old male who was admitted on 9/20/2024.      Assessment:  1) progressive acute kidney injury:  Urine sodium of 20.  Urinalysis bland.  Treated along lines hepatorenal syndrome with albumin, midodrine, octreotide.  Mild to moderate left-sided hydronephrosis.  Possible contribution by urinary retention and obstruction as well with nearly 900 cc retained.  Now with Lizarraga in place and improvement in creatinine to 1.09.      2) hyponatremia: With BELA and liver failure.  Improved to normal range     3) cirrhosis, second alcohol with associated portal hypertension.    Noted recent alcohol use.     Other:  Anemia, hemoglobin same as last admission with a value of 9.7   Coagulopathy  Hepatic encephalopathy     \Okay to discharge from renal standpoint.  Okay to discontinue octreotide to discharge.  Continue midodrine  Continue to hold diuretics on discharge.  Can be resumed in 1 to 2 weeks as outpatient if creatinine remains stable.  Low-sodium diet.  P.o. fluid restriction of 1.5 L/day.    Okay to discharge from renal standpoint.  Patient can follow with nephrology as needed at [place] if creatinine worsens.    Discussed with primary team in person.  Nephrology will sign off.          Interval History:     No acute issues overnight.  Creatinine proved to 1.  Blood pressure is reasonable.  Status post paracentesis today with ~3 L removed.  Denies any complaints.  Continues on Lizarraga catheter.  Noted plan to discharge possibly today to [place] with Lizarraga in place.       Medications and Allergies:     Current Facility-Administered Medications   Medication Dose Route Frequency Provider Last Rate Last Admin    [Held by provider] furosemide (LASIX) tablet 20 mg  20 mg Oral BID Kishore Brady MD        lactulose (CHRONULAC) solution 20 g  20 g Oral Q6H Campos Kurtz MD   20 g at 09/23/24 0911    magnesium oxide (MAG-OX)  tablet 400 mg  400 mg Oral BID Kishore Brady MD   400 mg at 09/23/24 0911    metoprolol succinate ER (TOPROL XL) 24 hr tablet 25 mg  25 mg Oral Daily Campos Kurtz MD        midodrine (PROAMATINE) tablet 5 mg  5 mg Oral TID Campos Kurtz MD   5 mg at 09/23/24 0911    octreotide (sandoSTATIN) injection 100 mcg  100 mcg Subcutaneous TID Kishore Brady MD   100 mcg at 09/23/24 0914    omeprazole (PriLOSEC) CR capsule 40 mg  40 mg Oral Daily before supper Kishore Brady MD   40 mg at 09/22/24 1633    sodium chloride (PF) 0.9% PF flush 3 mL  3 mL Intracatheter Q8H Kishore Brady MD   3 mL at 09/23/24 0911    [Held by provider] spironolactone (ALDACTONE) tablet 25 mg  25 mg Oral Daily Kishore Brady MD            Allergies   Allergen Reactions    Bee Venom Anaphylaxis    Hctz [Hydrochlorothiazide] Rash    Penicillins Hives    Roxicet [Oxycodone-Acetaminophen] Hives            Physical Exam:   Vitals were reviewed  /82 (BP Location: Right arm)   Pulse 112   Temp 98.3  F (36.8  C) (Oral)   Resp 20   Wt 68.6 kg (151 lb 3.8 oz)   SpO2 99%   BMI 23.00 kg/m      Wt Readings from Last 3 Encounters:   09/23/24 68.6 kg (151 lb 3.8 oz)   09/20/24 75.8 kg (167 lb)   09/18/24 75.9 kg (167 lb 6.4 oz)       I  GENERAL: No distress, sitting in bedside chair  HEENT:  Normocephalic.   CV: regular with no murmurs noted.  2+ edema bilateral legs  RESP: Clear bilaterally with good efforts  GI: Abdomen distended, decrease in size and nontender             Data:     BMP  Recent Labs   Lab 09/23/24  0612 09/21/24  2232 09/20/24  1703 09/18/24  1026    132* 129* 125*   POTASSIUM 3.6 3.6 4.1 3.7   CHLORIDE 101 97* 92* 90*   YUNIOR 8.6* 8.6* 8.9 8.9   CO2 26 24 22 22   BUN 17.5 29.9* 38.0* 36.4*   CR 1.09 1.58* 2.07* 2.08*   * 148* 150* 139*     CBC  Recent Labs   Lab 09/23/24  0612 09/21/24  2232 09/20/24  1703 09/18/24  1026   WBC 5.5 5.0 6.0 5.1   HGB 8.3* 8.2*  9.7* 9.7*   HCT 23.9* 22.5* 26.9* 26.9*   MCV 94 94 92 92   PLT 70* 76* 103* 91*     Lab Results   Component Value Date    AST 38 09/21/2024    ALT 15 09/21/2024    ALKPHOS 94 09/21/2024    BILITOTAL 1.9 (H) 09/21/2024    KE 78 (H) 09/22/2024     Lab Results   Component Value Date    INR 1.89 (H) 09/21/2024       Attestation:  I have reviewed today's vital signs, notes, medications, labs and imaging.    Alvin Calzada MD  [facility] - [facility]phrology  Office: [phone]

## 2024-09-24 NOTE — PROGRESS NOTES
Clinic Care Coordination Contact  [facility]/Voicemail    Clinical Data: Care Coordinator Outreach    Outreach Documentation Number of Outreach Attempt   9/24/2024  11:08 AM 1       Left message on patient's voicemail with call back information and requested return call.    Plan: Care Coordinator will try to reach patient again in 1-2 business days.    Farzana Jones RN Care Coordination   [facility]  Email: [email or web address]  Phone: [phone]

## 2024-09-24 NOTE — LETTER
[facility] CARE COORDINATION  [address] [address]    September 25, 2024    Isabel Alvarez  [id number] [facility] [address]      Dear Isabel,    I am a clinic care coordinator who works with Josias Ch MD with the [facility]. I wanted to introduce myself and provide you with my contact information for you to be able to call me with any questions or concerns. Below is a description of clinic care coordination and how I can further assist you.       The clinic care coordination team is made up of a registered nurse, , financial resource worker and community health worker who understand the health care system. The goal of clinic care coordination is to help you manage your health and improve access to the health care system. Our team works alongside your provider to assist you in determining your health and social needs. We can help you obtain health care and community resources, providing you with necessary information and education. We can work with you through any barriers and develop a care plan that helps coordinate and strengthen the communication between you and your care team.  Our services are voluntary and are offered without charge to you personally.    Please feel free to contact me with any questions or concerns regarding care coordination and what we can offer.      We are focused on providing you with the highest-quality healthcare experience possible.    Sincerely,     Farzana Jones RN Care Coordination   [facility]-  [place], [place], [place]  Phone: [phone]

## 2024-09-25 NOTE — PROGRESS NOTES
Clinic Care Coordination Contact  [facility]/Voicemail    Clinical Data: Care Coordinator Outreach    Outreach Documentation Number of Outreach Attempt   9/24/2024  11:08 AM 1   9/25/2024   2:57 PM 2       Left message on patient's voicemail with call back information and requested return call.    Plan: Care Coordinator will send care coordination introduction letter with care coordinator contact information and explanation of care coordination services via mail. Care Coordinator will do no further outreaches at this time.    Farzana Jones RN Care Coordination   [facility] Dontae Barrera  Email: [email or web address]  Phone: [phone]

## 2024-09-26 NOTE — TELEPHONE ENCOUNTER
S: Patient called to report his catheter is leaking and needs it taken out    B: Patient was discharged from the hospital 9/20 with a mohan to be removed as a trial under urology care.     A: Patient has urology appointment 10/16 to trial removal of catheter. Patient has an appointment tomorrow with  Patient catheter bag is full, urine is leaking from penis. Protocol states to be seen today or tomorrow to address leaking catheter.     R: Patient is planning to wait until his PCP appointment tomorrow with  to have catheter issues addressed. Updated appointment note.     Mellissa Early RN on 9/26/2024 at 3:31 PM    Reason for Disposition   Leakage of urine around catheter    Additional Information   Negative: Shock suspected (e.g., cold/pale/clammy skin, too weak to stand, low BP, rapid pulse)   Negative: Sounds like a life-threatening emergency to the triager   Negative: SEVERE abdominal pain   Negative: Catheter was accidentally pulled-out and bright red continuous bleeding   Negative: Fever > 100.4 F (38.0 C)   Negative: Drinking very little and dehydration suspected (e.g., no urine > 12 hours, very dry mouth, very lightheaded)   Negative: Patient sounds very sick or weak to the triager   Negative: Catheter was accidentally pulled-out   Negative: Catheter is broken and is not working (does not function normally)   Negative: Bleeding around catheter (e.g., from penis or female urethra)   Negative: New-onset MILD - MODERATE lower abdominal pain or swelling (distention)   Negative: Pink, slightly red, or tea-colored urine lasts > 24 hours that is not cleared by increased fluid intake, and no recent prostate or bladder surgery   Negative: Cloudy urine lasts > 24 hours and not cleared by increased fluid intake   Negative: Bloody or red-colored urine and no recent prostate or bladder surgery  (Exception: Brief episode and urine now clear.)   Negative: Bloody or red-colored urine and prostate or  "bladder surgery > 3 days (72 hours) ago   Negative: No urine in bag for > 4 hours and catheter is not kinked   Negative: Foul-smelling urine (urine smells bad)    Answer Assessment - Initial Assessment Questions  1. SYMPTOMS: \"What symptoms are you concerned about?\"      Leaking catheter  2. ONSET:  \"When did the symptoms start?\"      today  3. FEVER: \"Is there a fever?\" If Yes, ask: \"What is the temperature, how was it measured, and when did it start?\"      no  4. ABDOMEN PAIN: \"Is there any abdomen pain?\" (e.g., Scale 1-10; or mild, moderate, severe)      no  5. URINE COLOR: \"What color is the urine?\"  \"Is there blood present in the urine?\" (e.g., clear, yellow, cloudy, tea-colored, blood streaks, bright red)      yellow  6. URINE AMOUNT: \"When did you last empty the urine from the collection bag?\" \"How much urine was in the bag at that time?\" How much urine is in the collection bag now?\"      Bag is partially full  7. INSERTION: \"How long have you (they) had the catheter?\"      9/20/24  8. OTHER SYMPTOMS: \"Are there any other symptoms?\" (e.g., abdomen swelling, back pain, bladder spasms, constipation, foul smelling urine, leaking of urine)       none  9. MEDICINES: \"Are you taking any medicines to treat urinary problems?\" (e.g., antibiotics for a urinary tract infection, medicines to treat bladder spasms)         10. PREGNANCY: \"Is there any chance you are pregnant?\" \"When was your last menstrual period?\"        no    Protocols used: Urinary Catheter (e.g., Lizarraga) Symptoms and [id number]-A-CP    "

## 2024-09-27 NOTE — PROGRESS NOTES
Assessment & Plan   Problem List Items Addressed This Visit       Alcoholic cirrhosis of liver with ascites (H) - Primary    Relevant Orders    CBC with platelets    Comprehensive metabolic panel (BMP + Alb, Alk Phos, ALT, AST, Total. Bili, TP)    Ammonia    INR    US Paracentesis with Albumin    Hepatic encephalopathy (H)     Other Visit Diagnoses       Chronic obstructive pulmonary disease with (acute) exacerbation (H)        Hydronephrosis, unspecified hydronephrosis type                 Patient with alcoholic cirrhosis, COPD, history of hepatic encephalopathy and recent hydronephrosis.  He was hospitalized and had 2 paracentesis.  4.5 L removed on the day of discharge.  Fluid was found to be a transudate so not treated with antibiotics at discharge.    He is not drinking alcohol and is congratulated on this.  Hopefully his liver will start to improve at some point but it is not yet he still has ascites and encephalopathy.  We will check labs today including an ammonia and kidney function.  He is not taking diuretics due to his kidney disease and hypotension.  Presumably he will need another paracentesis so I will order one for next week    Hepatic encephalopathy it sounds like he is taking lactulose unclear how many bowel movements he is having but is taking the prescription medication he says.    Other medications he is not taking diuretics possibly taking metoprolol or midodrine difficult to know but only taking once a day, his blood pressure is okay we will so we will continue with the medications as he is taking them.    Hydronephrosis and kidney disease in the hospital he does have a catheter in place with the plan to continue his Lizarraga catheter until he is seen by urology on October 16.  Does have a little bit of leaking I assume from different position of the catheter hopefully this will resolve    There is no blood seen in the Lizarraga catheter bag today.    COPD is stable, needs to quit smoking.    He is  "suppose to be omeprazole for GI protection.              MED REC REQUIRED  Post Medication Reconciliation Status: discharge medications reconciled and changed, per note/orders  FUTURE APPOINTMENTS:       - Follow-up visit in next week will do paracentesis       Nasreen Hall is a 68 year old, presenting for the following health issues:  Hospital F/U      9/27/2024    12:54 PM   Additional Questions   Roomed by Prasad     [facility] Follow-up Visit:    Hospital/Nursing Home/IP Rehab Facility: [facility]  Date of Admission: 09/20/2024  Date of Discharge: 09/23/2024  Reason(s) for Admission: abdominal distension  Was the patient in the ICU or did the patient experience delirium during hospitalization?  No  Do you have any other stressors you would like to discuss with your provider? No    Problems taking medications regularly:  None  Medication changes since discharge: None  Problems adhering to non-medication therapy:  None    Summary of hospitalization:  [facility] discharge summary reviewed  Diagnostic Tests/Treatments reviewed.  Follow up needed: none  Other Healthcare Providers Involved in Patient s Care:         None  Update since discharge: fluctuating course.         Plan of care communicated with patient           Stomach feels good, not drinking alcohol.      Lizarraga catheter is in place, draining.      He says he is only taking two medications.  Taking lactulose three times a day. Taking a pill to help his blood pressure midodrine.              Objective    /58 (BP Location: Right arm, Patient Position: Sitting, Cuff Size: Adult Regular)   Pulse 87   Temp 96.9  F (36.1  C) (Temporal)   Resp 18   Ht 1.727 m (5' 8\")   Wt 64.9 kg (143 lb 1.6 oz)   SpO2 100%   BMI 21.76 kg/m    Body mass index is 21.76 kg/m .  Physical Exam   NAD  Slight jaundice, confusion   Heart regular   Lungs clear  Abd soft, ascites but not overly distended.   Lizarraga catheter " in place, darker urine             Signed Electronically by: Josias Ch MD

## 2024-09-30 NOTE — TELEPHONE ENCOUNTER
Nurse Triage SBAR    Is this a 2nd Level Triage? NO    Situation: Confusion    Background:  states this has been happening for the past 2 weeks.     Assessment:  states he was recently discharged from the hospital but for the past 2 weeks he has been acting confused. States he has a catheter in and has been pulling on it. He also refuses to eat or take any of his prescribed medications, ignores people talking to him and wanders around the house.    Protocol Recommended Disposition:   Go to ED Now    Recommendation: Recommended to go to ER now.  verbalized understanding and has no further questions.         Does the patient meet one of the following criteria for ADS visit consideration? 16+ years old, with an [facility] PCP     TIP  Providers, please consider if this condition is appropriate for management at one of our Acute and Diagnostic Services sites.     If patient is a good candidate, please use dotphrase <dot>triageresponse and select Refer to ADS to document.      Reason for Disposition   Very strange or paranoid behavior    Additional Information   Negative: Stiff neck (can't touch chin to chest)   Negative: Difficult to awaken or acting confused (disoriented, slurred speech) and has diabetes mellitus   Negative: Difficult to awaken or acting confused (disoriented, slurred speech) and new-onset   Negative: Weakness of the face, arm, or leg on one side of the body and new-onset   Negative: Numbness of the face, arm, or leg on one side of the body and new-onset   Negative: Loss of speech or garbled speech and new-onset   Negative: Difficulty breathing and bluish (or gray) lips or face   Negative: Shock suspected (e.g., cold/pale/clammy skin, too weak to stand, low BP, rapid pulse)   Negative: Seeing or hearing or feeling things that are not there (i.e., auditory, visual, or tactile hallucinations)   Negative: Followed a head injury   Negative: Drug overdose suspected   Negative: Violent behavior,  "or threatening to physically hurt or kill someone   Negative: Sounds like a life-threatening emergency to the triager   Negative: Questions or concerns about alcohol use, unhealthy alcohol use, binge drinking, intoxication, or withdrawal   Negative: Questions or concerns about substance use (drug use), unhealthy drug use, intoxication, or withdrawal   Negative: Diabetes mellitus and confusion from low blood sugar (i.e., < 60 mg/dl or 3.5 mmol/l)   Negative: Headache or vomiting    Answer Assessment - Initial Assessment Questions  1. LEVEL OF CONSCIOUSNESS: \"How is he (she, the patient) acting right now?\" (e.g., alert-oriented, confused, lethargic, stuporous, comatose)      Wanders around the house, won't eat, ignores people talking, refusing medications,    2. ONSET: \"When did the confusion start?\"  (minutes, hours, days)      2 weeks ago  3. PATTERN \"Does this come and go, or has it been constant since it started?\"  \"Is it present now?\"      Constant   4. ALCOHOL or DRUGS: \"Has he been drinking alcohol or taking any drugs?\"       Sister denies  5. NARCOTIC MEDICINES: \"Has he been receiving any narcotic medications?\" (e.g., morphine, Vicodin)      Denies   6. CAUSE: \"What do you think is causing the confusion?\"       unknown  7. OTHER SYMPTOMS: \"Are there any other symptoms?\" (e.g., difficulty breathing, headache, fever, weakness)      Sister denies    Protocols used: Confusion - Delirium-A-OH    "

## 2024-09-30 NOTE — TELEPHONE ENCOUNTER
----- Message from Josias Ch sent at 9/27/2024  2:58 PM CDT -----  Please let him know his labs are ok, but he needs to take the lactulose syrup 3 times a day to have bowel movements.     Needs to drink more fluids to help his kidneys.

## 2024-10-05 NOTE — PROGRESS NOTES
Received page from [facility]. Apparently unable to reach pt so orders were cancelled will route to primary when clinic reopens. Dima is pvp

## 2024-10-07 NOTE — TELEPHONE ENCOUNTER
[facility] called on call doc on 10/5/24. They were unable to reach patient to schedule home health services but the person on the line could not tell me what the actual services were for. Will route to PCP staff.

## 2024-10-07 NOTE — TELEPHONE ENCOUNTER
RN TRIAGE CALL:    Patient Contact    Attempt # 1    Was call answered?  No.  Left message on voicemail with information to call me back.    Called [facility]. Was told  BAIRON Beaulieu is assigned to patient's case and was transferred to Christofer's voicemail. Left message for Christofer to return call.    SOSA SyedN, RN

## 2024-10-07 NOTE — TELEPHONE ENCOUNTER
Christofer with [facility] called back. She states that patient was discharged from the hospital 10/3/24, but went back to the hospital 10/4/24 and is still currently in the hospital. She states hopefully the hospital staff will order home health again when he is discharged.     Mary Mann, BSN, RN

## (undated) DEVICE — SOL WATER IRRIG 1000ML BOTTLE 07139-09

## (undated) DEVICE — CAST PADDING 4" WEBRIL UNSTERILE

## (undated) DEVICE — GLOVE BIOGEL PI SZ 7.5 40875

## (undated) DEVICE — CAST PLASTER SPLINT 4X15" EXTRA FAST

## (undated) DEVICE — BASIN SET MINOR DISP

## (undated) DEVICE — TUBING SUCTION 12"X1/4" N612

## (undated) DEVICE — DRSG ADAPTIC 3X3" 6112

## (undated) DEVICE — BNDG ELASTIC 4"X5YDS UNSTERILE 6611-40

## (undated) DEVICE — NDL ECLIPSE 18GA 1.5"

## (undated) DEVICE — PACK HAND WRIST FOREARM CUSTOM

## (undated) DEVICE — SOL NACL 0.9% IRRIG 1000ML BOTTLE 07138-09

## (undated) DEVICE — GLOVE PROTEXIS BLUE W/NEU-THERA 7.5  2D73EB75

## (undated) DEVICE — ESU GROUND PAD UNIVERSAL W/O CORD

## (undated) DEVICE — SU ETHILON 5-0 PS-2 18" 1666H

## (undated) DEVICE — ESU PENCIL SMOKE EVAC W/ROCKER SWITCH 0703-047-000

## (undated) RX ORDER — FENTANYL CITRATE 50 UG/ML
INJECTION, SOLUTION INTRAMUSCULAR; INTRAVENOUS
Status: DISPENSED
Start: 2021-11-12

## (undated) RX ORDER — DEXAMETHASONE SODIUM PHOSPHATE 10 MG/ML
INJECTION, SOLUTION INTRAMUSCULAR; INTRAVENOUS
Status: DISPENSED
Start: 2021-11-12

## (undated) RX ORDER — BUPIVACAINE HYDROCHLORIDE 5 MG/ML
INJECTION, SOLUTION EPIDURAL; INTRACAUDAL
Status: DISPENSED
Start: 2021-11-12

## (undated) RX ORDER — PROPOFOL 10 MG/ML
INJECTION, EMULSION INTRAVENOUS
Status: DISPENSED
Start: 2021-11-12

## (undated) RX ORDER — ONDANSETRON 2 MG/ML
INJECTION INTRAMUSCULAR; INTRAVENOUS
Status: DISPENSED
Start: 2021-11-12

## (undated) RX ORDER — LIDOCAINE HYDROCHLORIDE 20 MG/ML
INJECTION, SOLUTION EPIDURAL; INFILTRATION; INTRACAUDAL; PERINEURAL
Status: DISPENSED
Start: 2021-11-12